# Patient Record
Sex: MALE | Race: WHITE | NOT HISPANIC OR LATINO | Employment: OTHER | URBAN - METROPOLITAN AREA
[De-identification: names, ages, dates, MRNs, and addresses within clinical notes are randomized per-mention and may not be internally consistent; named-entity substitution may affect disease eponyms.]

---

## 2017-01-09 ENCOUNTER — ALLSCRIPTS OFFICE VISIT (OUTPATIENT)
Dept: OTHER | Facility: OTHER | Age: 82
End: 2017-01-09

## 2017-01-10 ENCOUNTER — GENERIC CONVERSION - ENCOUNTER (OUTPATIENT)
Dept: OTHER | Facility: OTHER | Age: 82
End: 2017-01-10

## 2017-01-12 ENCOUNTER — GENERIC CONVERSION - ENCOUNTER (OUTPATIENT)
Dept: OTHER | Facility: OTHER | Age: 82
End: 2017-01-12

## 2017-01-31 ENCOUNTER — GENERIC CONVERSION - ENCOUNTER (OUTPATIENT)
Dept: OTHER | Facility: OTHER | Age: 82
End: 2017-01-31

## 2017-02-23 ENCOUNTER — HOSPITAL ENCOUNTER (OUTPATIENT)
Dept: NON INVASIVE DIAGNOSTICS | Facility: CLINIC | Age: 82
Discharge: HOME | End: 2017-02-23
Payer: MEDICARE

## 2017-02-23 DIAGNOSIS — I10 ESSENTIAL (PRIMARY) HYPERTENSION: ICD-10-CM

## 2017-02-23 DIAGNOSIS — I05.9 RHEUMATIC MITRAL VALVE DISEASE: ICD-10-CM

## 2017-02-23 DIAGNOSIS — E78.5 HYPERLIPIDEMIA: ICD-10-CM

## 2017-02-23 DIAGNOSIS — Z95.0 PRESENCE OF CARDIAC PACEMAKER: ICD-10-CM

## 2017-02-23 PROCEDURE — 93306 TTE W/DOPPLER COMPLETE: CPT

## 2017-02-24 ENCOUNTER — ALLSCRIPTS OFFICE VISIT (OUTPATIENT)
Dept: OTHER | Facility: OTHER | Age: 82
End: 2017-02-24

## 2017-02-27 ENCOUNTER — GENERIC CONVERSION - ENCOUNTER (OUTPATIENT)
Dept: OTHER | Facility: OTHER | Age: 82
End: 2017-02-27

## 2017-02-28 ENCOUNTER — GENERIC CONVERSION - ENCOUNTER (OUTPATIENT)
Dept: OTHER | Facility: OTHER | Age: 82
End: 2017-02-28

## 2017-03-01 ENCOUNTER — APPOINTMENT (OUTPATIENT)
Dept: LAB | Facility: CLINIC | Age: 82
End: 2017-03-01
Payer: MEDICARE

## 2017-03-01 ENCOUNTER — GENERIC CONVERSION - ENCOUNTER (OUTPATIENT)
Dept: OTHER | Facility: OTHER | Age: 82
End: 2017-03-01

## 2017-03-01 DIAGNOSIS — I10 ESSENTIAL (PRIMARY) HYPERTENSION: ICD-10-CM

## 2017-03-01 DIAGNOSIS — I25.10 ATHEROSCLEROTIC HEART DISEASE OF NATIVE CORONARY ARTERY WITHOUT ANGINA PECTORIS: ICD-10-CM

## 2017-03-01 DIAGNOSIS — E78.5 HYPERLIPIDEMIA: ICD-10-CM

## 2017-03-01 DIAGNOSIS — E11.9 TYPE 2 DIABETES MELLITUS WITHOUT COMPLICATIONS (HCC): ICD-10-CM

## 2017-03-01 LAB
CHOLEST SERPL-MCNC: 134 MG/DL (ref 50–200)
CREAT UR-MCNC: 72 MG/DL
EST. AVERAGE GLUCOSE BLD GHB EST-MCNC: 100 MG/DL
HBA1C MFR BLD: 5.1 % (ref 4.2–6.3)
HDLC SERPL-MCNC: 53 MG/DL (ref 40–60)
LDLC SERPL CALC-MCNC: 68 MG/DL (ref 0–100)
MICROALBUMIN UR-MCNC: 5.2 MG/L (ref 0–20)
MICROALBUMIN/CREAT 24H UR: 7 MG/G CREATININE (ref 0–30)
TRIGL SERPL-MCNC: 63 MG/DL

## 2017-03-01 PROCEDURE — 82570 ASSAY OF URINE CREATININE: CPT

## 2017-03-01 PROCEDURE — 80061 LIPID PANEL: CPT

## 2017-03-01 PROCEDURE — 82043 UR ALBUMIN QUANTITATIVE: CPT

## 2017-03-01 PROCEDURE — 36415 COLL VENOUS BLD VENIPUNCTURE: CPT

## 2017-03-01 PROCEDURE — 83036 HEMOGLOBIN GLYCOSYLATED A1C: CPT

## 2017-03-13 ENCOUNTER — ALLSCRIPTS OFFICE VISIT (OUTPATIENT)
Dept: OTHER | Facility: OTHER | Age: 82
End: 2017-03-13

## 2017-03-23 ENCOUNTER — GENERIC CONVERSION - ENCOUNTER (OUTPATIENT)
Dept: OTHER | Facility: OTHER | Age: 82
End: 2017-03-23

## 2017-04-08 ENCOUNTER — GENERIC CONVERSION - ENCOUNTER (OUTPATIENT)
Dept: OTHER | Facility: OTHER | Age: 82
End: 2017-04-08

## 2017-05-02 ENCOUNTER — ALLSCRIPTS OFFICE VISIT (OUTPATIENT)
Dept: OTHER | Facility: OTHER | Age: 82
End: 2017-05-02

## 2017-05-05 ENCOUNTER — GENERIC CONVERSION - ENCOUNTER (OUTPATIENT)
Dept: OTHER | Facility: OTHER | Age: 82
End: 2017-05-05

## 2017-05-23 ENCOUNTER — GENERIC CONVERSION - ENCOUNTER (OUTPATIENT)
Dept: OTHER | Facility: OTHER | Age: 82
End: 2017-05-23

## 2017-08-02 ENCOUNTER — ALLSCRIPTS OFFICE VISIT (OUTPATIENT)
Dept: OTHER | Facility: OTHER | Age: 82
End: 2017-08-02

## 2017-08-31 ENCOUNTER — GENERIC CONVERSION - ENCOUNTER (OUTPATIENT)
Dept: OTHER | Facility: OTHER | Age: 82
End: 2017-08-31

## 2017-09-14 ENCOUNTER — ALLSCRIPTS OFFICE VISIT (OUTPATIENT)
Dept: OTHER | Facility: OTHER | Age: 82
End: 2017-09-14

## 2017-09-14 ENCOUNTER — TRANSCRIBE ORDERS (OUTPATIENT)
Dept: ADMINISTRATIVE | Facility: HOSPITAL | Age: 82
End: 2017-09-14

## 2017-09-14 DIAGNOSIS — E78.5 OTHER AND UNSPECIFIED HYPERLIPIDEMIA: ICD-10-CM

## 2017-09-14 DIAGNOSIS — Z95.0 CARDIAC PACEMAKER IN SITU: ICD-10-CM

## 2017-09-14 DIAGNOSIS — I10 ESSENTIAL HYPERTENSION, MALIGNANT: ICD-10-CM

## 2017-09-14 DIAGNOSIS — I25.10 ATHEROSCLEROSIS OF NATIVE CORONARY ARTERY OF NATIVE HEART WITHOUT ANGINA PECTORIS: Primary | ICD-10-CM

## 2017-09-14 DIAGNOSIS — I34.1 J.B. BARLOW'S SYNDROME: ICD-10-CM

## 2017-11-03 ENCOUNTER — ALLSCRIPTS OFFICE VISIT (OUTPATIENT)
Dept: OTHER | Facility: OTHER | Age: 82
End: 2017-11-03

## 2017-11-08 ENCOUNTER — TRANSCRIBE ORDERS (OUTPATIENT)
Dept: LAB | Facility: CLINIC | Age: 82
End: 2017-11-08

## 2017-11-08 ENCOUNTER — APPOINTMENT (OUTPATIENT)
Dept: LAB | Facility: CLINIC | Age: 82
End: 2017-11-08
Payer: MEDICARE

## 2017-11-08 DIAGNOSIS — C61 MALIGNANT NEOPLASM OF PROSTATE (HCC): Primary | ICD-10-CM

## 2017-11-08 LAB — PSA SERPL-MCNC: 1.7 NG/ML (ref 0–4)

## 2017-11-08 PROCEDURE — 36415 COLL VENOUS BLD VENIPUNCTURE: CPT

## 2017-11-08 PROCEDURE — G0103 PSA SCREENING: HCPCS

## 2017-11-20 ENCOUNTER — GENERIC CONVERSION - ENCOUNTER (OUTPATIENT)
Dept: OTHER | Facility: OTHER | Age: 82
End: 2017-11-20

## 2017-11-27 ENCOUNTER — GENERIC CONVERSION - ENCOUNTER (OUTPATIENT)
Dept: OTHER | Facility: OTHER | Age: 82
End: 2017-11-27

## 2017-11-27 LAB
LEFT EYE DIABETIC RETINOPATHY: NORMAL
RIGHT EYE DIABETIC RETINOPATHY: NORMAL

## 2018-01-12 VITALS
HEART RATE: 82 BPM | WEIGHT: 158.6 LBS | SYSTOLIC BLOOD PRESSURE: 132 MMHG | TEMPERATURE: 96.7 F | RESPIRATION RATE: 16 BRPM | HEIGHT: 69 IN | BODY MASS INDEX: 23.49 KG/M2 | DIASTOLIC BLOOD PRESSURE: 72 MMHG

## 2018-01-12 NOTE — MISCELLANEOUS
Message   Recorded as Task   Date: 06/29/2016 01:10 PM, Created By: Emeli Charlton   Task Name: Follow Up   Assigned To: SPA vashti clinical,Team   Regarding Patient: Katey Meza, Status: Active   Comment:    Emeli Charlton - 29 Jun 2016 1:10 PM     TASK CREATED  Caller: Self; General Medical Question; [phone] (Home)  TC from pt today at 1:05 stating he had left hip inj on 5/5/16 and asking how often it can be done? Advised pt typically it can be repeated in 3 months, with that in mind pt will c/b the end of July to schedule inj for Aug if needed  Michael Lopes - 29 Jun 2016 1:27 PM     TASK REPLIED TO: Previously Assigned To MATHIEU lincoln,Team  md aware        Active Problems    1  Aortic valve disease (424 1) (I35 9)   2  Arteriosclerosis of coronary artery (414 00) (I25 10)   3  Carpal tunnel syndrome (354 0) (G56 00)   4  Colon cancer screening (V76 51) (Z12 11)   5  Disc degeneration, lumbar (722 52) (M51 36)   6  Facet arthritis of lumbar region (721 3) (M46 96)   7  Glaucoma (365 9) (H40 9)   8  Glaucoma screening (V80 1) (Z13 5)   9  Hyperlipidemia (272 4) (E78 5)   10  Hypertension (401 9) (I10)   11  Lumbar canal stenosis (724 02) (M48 06)   12  Mitral valve disorder (424 0) (I05 9)   13  Need for prophylactic vaccination against Streptococcus pneumoniae (pneumococcus)    (V03 82) (Z23)   14  Need for prophylactic vaccination and inoculation against influenza (V04 81) (Z23)   15  Osteoarthritis of left hip (715 95) (M16 12)   16  Pacemaker (V45 01) (Z95 0)   17  Pacemaker battery depletion (V53 31) (Z45 010)   18  Prostate cancer (185) (C61)   19  Screening for genitourinary condition (V81 6) (Z13 89)   20  Screening for neurological condition (V80 09) (Z13 89)   21  Type 2 diabetes mellitus (250 00) (E11 9)    Current Meds   1  Aspirin 81 MG TABS; TAKE 1 TABLET EVERY OTHER DAY Recorded   2  Atorvastatin Calcium 10 MG Oral Tablet; Take 1 tablet daily;    Therapy: 51Lte6880 to (Last Rx:26Dso8171)  Requested for: 65Hjz9940 Ordered   3  Folic Acid 218 MCG Oral Tablet; 1 tablet every other day Recorded   4  Glimepiride 1 MG Oral Tablet; One tablet daily; Therapy: 58DZF1663 to (Evaluate:06Apr2017)  Requested for: 99Idb7275; Last   Rx:42Fcs2859 Ordered   5  Metoprolol Tartrate 25 MG Oral Tablet; TAKE 1 TABLET DAILY IN THE EVENING; Therapy: 37UCC5648 to (Last Rx:82Txj7393)  Requested for: 16ALV4853 Ordered   6  Metoprolol Tartrate 50 MG Oral Tablet; TAKE 1 TABLET DAILY IN THE MORNING; Therapy: 86DEG6063 to (Last Rx:18Jru5164)  Requested for: 66WLB9083 Ordered   7  One Daily For Men 50+ Advanced TABS; Therapy: (Armando Gardner) to Recorded   8  OneTouch Delica Lancets 86D Miscellaneous; pt tests once daily  MDD:1;   Therapy: 03MIP6602 to (Last Rx:17Ewh5687)  Requested for: 92Dir9316 Ordered   9  OneTouch Ultra Blue In [employer]; test once daily; Therapy: 00IWC9035 to (Evaluate:24Jun2016)  Requested for: 22Fvm0136; Last   Rx:72Ujw8736 Ordered    Allergies    1  Penicillins   2   Plavix TABS    Signatures   Electronically signed by : Ezequiel Driscoll, ; Jun 29 2016  1:48PM EST                       (Author)

## 2018-01-13 VITALS
HEART RATE: 66 BPM | BODY MASS INDEX: 22.9 KG/M2 | WEIGHT: 160 LBS | HEIGHT: 70 IN | SYSTOLIC BLOOD PRESSURE: 128 MMHG | DIASTOLIC BLOOD PRESSURE: 80 MMHG

## 2018-01-13 NOTE — RESULT NOTES
Message   Recorded as Task   Date: 08/19/2016 03:07 PM, Created By: Mary Jo Oviedo   Task Name: Follow Up   Assigned To: Mary Jo Oviedo   Regarding Patient: Janette Cloud, Status:  In Progress   Comment:    Ann Ley - 19 Aug 2016 3:07 PM     TASK CREATED  Received call from patient requesting to be scheduled for a repeat injection - patient states same pain, same area - pain located in left hip down left leg - patient states pain returned 2 weeks after his last injection but was advised it was too soon for a repeat and would have to wait - patient rates pain a 10 - please advise - thank you    ***last office visit was on 4/22/16 and last injection was on 5/6/16 for a Left Intra-art Hip injection***   Adeel Lee - 21 Aug 2016 9:07 PM     TASK REPLIED TO: Previously Assigned To Aravind shepard to repeat injection   Ann Ley - 23 Aug 2016 10:55 AM     TASK EDITED  Left message for patient to call back   Mary Jo Oviedo - 23 Aug 2016 10:55 AM     TASK IN [address] [place] [address] - 23 Aug 2016 1:19 PM     TASK EDITED  Received message from patient returning my call - left message for patient to call back   Mary Jo Oviedo - 23 Aug 2016 3:42 PM     TASK EDITED  S/W patient - patient scheduled for Tuesday, Sept 6 at [facility] with Dr Regino Castillo - patient advised nothing to eat or drink 1 hour prior to procedure but encouraged to have a light lunch - patient on low dose aspirin, no need to hold for this procedure, patient denies any abx's - patient also advised to arrange for  - patient aware and agreed        Signatures   Electronically signed by : Miriam Duran, ; Aug 23 2016  3:43PM EST                       (Author)

## 2018-01-14 VITALS
HEIGHT: 69 IN | DIASTOLIC BLOOD PRESSURE: 60 MMHG | SYSTOLIC BLOOD PRESSURE: 140 MMHG | WEIGHT: 158 LBS | BODY MASS INDEX: 23.4 KG/M2 | HEART RATE: 64 BPM

## 2018-01-14 VITALS
SYSTOLIC BLOOD PRESSURE: 140 MMHG | DIASTOLIC BLOOD PRESSURE: 72 MMHG | HEART RATE: 80 BPM | BODY MASS INDEX: 22.39 KG/M2 | HEIGHT: 70 IN | RESPIRATION RATE: 16 BRPM | TEMPERATURE: 95.1 F | WEIGHT: 156.38 LBS

## 2018-01-15 NOTE — RESULT NOTES
Message  Landry Zepeda I have enclosed a copy of your recent labs  your diabetes is well controlled with an A1c of 5 1 less than 7 normal  continue with your current medications  Results/Data     (1) LIPID PANEL, FASTING   CHOLESTEROL: 134 mg/dL Reference Range   TRIGLYCERIDES: 63 mg/dL Reference Range <=150  HDL,DIRECT: 53 mg/dL Reference Range 40-60  LDL CHOLESTEROL CALCULATED: 68 mg/dL Reference Range 0-100  (1) HEMOGLOBIN A1C   HEMOGLOBIN A1C: 5 1 % Reference Range 4 2-6 3  EST  AVG   GLUCOSE: 100 mg/dl  (1) MICROALBUMIN CREATININE RATIO, RANDOM URINE   CREATININE URINE: 72 0 mg/dL  MICROALBUMIN,URINE: 5 2 mg/L Reference Range 0 0-20 0  MICROALBUMIN/ CREAT R: 7 mg/g creatinine Reference Range 0-30    Signatures   Electronically signed by : PIETRO Carlson ; Mar  1 2017  7:22PM EST                       (Author)

## 2018-01-15 NOTE — MISCELLANEOUS
Message   Recorded as Task   Date: 05/12/2016 03:57 PM, Created By: Laura Monday   Task Name: Follow Up   Assigned To: Mikhail lima,Team   Regarding Patient: Winnie Tomas, Status: Active   CommentRayolisl Yvrose - 12 May 2016 3:57 PM     TASK CREATED   Kiki Stephens - 12 May 2016 3:58 PM     TASK EDITED  F/U    Pt is S/P LT INTRA-ART HIP INJ perfromed on 5/6/16 by Dr Shabnam Ronquillo  No f/u scheduled  Helena Merrill - 13 May 2016 10:58 AM     TASK EDITED  1st attempt to reach pt  LM for return call  Helena Merrill - 13 May 2016 1:57 PM     TASK EDITED  Spoke with pt who received 50% relief from inj  Current level of pain 5/10, prior to inj 6-10/10  Pt was able to do some gardening yesterday  Pt will f/u PRN  Kathy Esteves - 13 May 2016 2:18 PM     TASK REPLIED TO: Previously Assigned To Kathy Esteves md aware        Active Problems    1  Aortic valve disease (424 1) (I35 9)   2  Arteriosclerosis of coronary artery (414 00) (I25 10)   3  Carpal tunnel syndrome (354 0) (G56 00)   4  Disc degeneration, lumbar (722 52) (M51 36)   5  Facet arthritis of lumbar region (721 3) (M46 96)   6  Glaucoma (365 9) (H40 9)   7  Glaucoma screening (V80 1) (Z13 5)   8  Hyperlipidemia (272 4) (E78 5)   9  Hypertension (401 9) (I10)   10  Lumbar canal stenosis (724 02) (M48 06)   11  Mitral valve disorder (424 0) (I05 9)   12  Need for prophylactic vaccination against Streptococcus pneumoniae (pneumococcus)    (V03 82) (Z23)   13  Need for prophylactic vaccination and inoculation against influenza (V04 81) (Z23)   14  Osteoarthritis of left hip (715 95) (M16 12)   15  Pacemaker (V45 01) (Z95 0)   16  Pacemaker battery depletion (V53 31) (Z45 010)   17  Prostate cancer (185) (C61)   18  Screening for genitourinary condition (V81 6) (Z13 89)   19  Screening for neurological condition (V80 09) (Z13 89)   20  Type 2 diabetes mellitus (250 00) (E11 9)    Current Meds   1   Aspirin 81 MG TABS; TAKE 1 TABLET EVERY OTHER DAY Recorded   2  Atorvastatin Calcium 10 MG Oral Tablet; Take 1 tablet daily; Therapy: 32Agf7388 to (Last Rx:36Pot1903)  Requested for: 69Heo8768 Ordered   3  Folic Acid 693 MCG Oral Tablet; 1 tablet every other day Recorded   4  Glimepiride 1 MG Oral Tablet; One tablet daily; Therapy: 74WWB8781 to (Evaluate:06Apr2017)  Requested for: 64Wpc4905; Last   Rx:98Eyy0133 Ordered   5  Metoprolol Tartrate 25 MG Oral Tablet; TAKE 1 TABLET DAILY IN THE EVENING; Therapy: 21TWB0184 to (Last Rx:54Fkc1485)  Requested for: 61Ocw9415 Ordered   6  Metoprolol Tartrate 50 MG Oral Tablet; TAKE 1 TABLET DAILY IN THE MORNING; Therapy: 04WEH0649 to (Last Rx:82Pth2757)  Requested for: 04Bue2455 Ordered   7  One Daily For Men 50+ Advanced TABS; Therapy: (Dayami Islas) to Recorded   8  OneTouch Delica Lancets 11F Miscellaneous; pt tests once daily  MDD:1;   Therapy: 43UHI6813 to (Last Rx:95Tlg7397)  Requested for: 12Uub9912 Ordered   9  OneTouch Ultra Blue In [employer]; test once daily; Therapy: 24YNZ5991 to (Evaluate:24Jun2016)  Requested for: 93Taz5356; Last   Rx:31Sll1420 Ordered    Allergies    1  Penicillins   2   Plavix TABS    Signatures   Electronically signed by : Char Damian, ; May 13 2016  2:58PM EST                       (Author)

## 2018-01-30 ENCOUNTER — TELEPHONE (OUTPATIENT)
Dept: FAMILY MEDICINE CLINIC | Facility: CLINIC | Age: 83
End: 2018-01-30

## 2018-01-30 DIAGNOSIS — Z20.818 PERTUSSIS EXPOSURE: Primary | ICD-10-CM

## 2018-01-30 RX ORDER — AZITHROMYCIN 250 MG/1
TABLET, FILM COATED ORAL
Qty: 6 TABLET | Refills: 0 | Status: SHIPPED | OUTPATIENT
Start: 2018-01-30 | End: 2018-02-03

## 2018-02-02 DIAGNOSIS — I10 ESSENTIAL (PRIMARY) HYPERTENSION: Primary | ICD-10-CM

## 2018-02-03 RX ORDER — METOPROLOL TARTRATE 50 MG/1
TABLET, FILM COATED ORAL
Qty: 90 TABLET | Refills: 0 | Status: SHIPPED | OUTPATIENT
Start: 2018-02-03 | End: 2018-11-06 | Stop reason: SDUPTHER

## 2018-02-05 ENCOUNTER — CLINICAL SUPPORT (OUTPATIENT)
Dept: CARDIOLOGY CLINIC | Facility: CLINIC | Age: 83
End: 2018-02-05
Payer: MEDICARE

## 2018-02-05 DIAGNOSIS — I44.1 MOBITZ TYPE II ATRIOVENTRICULAR BLOCK: Primary | ICD-10-CM

## 2018-02-05 DIAGNOSIS — Z95.0 PRESENCE OF PERMANENT CARDIAC PACEMAKER: ICD-10-CM

## 2018-02-05 PROCEDURE — 93294 REM INTERROG EVL PM/LDLS PM: CPT | Performed by: INTERNAL MEDICINE

## 2018-02-05 PROCEDURE — 93296 REM INTERROG EVL PM/IDS: CPT | Performed by: INTERNAL MEDICINE

## 2018-02-06 NOTE — PROGRESS NOTES
CARELINK TRANSMISSION: BATTERY VOLTAGE ADEQUATE  (12 YRS)  AP 73%  63%  ALL AVAILABLE LEAD PARAMETERS WITHIN NORMAL LIMITS  NO SIGNIFICANT HIGH RATE EPISODES  NORMAL DEVICE FUNCTION  ---CELIS      Current Outpatient Prescriptions:     aspirin 81 MG tablet, Take 81 mg by mouth daily  , Disp: , Rfl:     atorvastatin (LIPITOR) 10 mg tablet, Take 10 mg by mouth daily  , Disp: , Rfl:     folic acid (FOLVITE) 024 MCG tablet, Take 800 mcg by mouth every other day , Disp: , Rfl:     glimepiride (AMARYL) 1 mg tablet, Take 1 mg by mouth every morning before breakfast , Disp: , Rfl:     metoprolol succinate (TOPROL-XL) 50 mg 24 hr tablet, Take 50 mg by mouth daily  , Disp: , Rfl:     metoprolol tartrate (LOPRESSOR) 25 mg tablet, TAKE 1 TABLET DAILY IN THE EVENING, Disp: 90 tablet, Rfl: 0    metoprolol tartrate (LOPRESSOR) 50 mg tablet, TAKE 1 TABLET DAILY IN THE MORNING, Disp: 90 tablet, Rfl: 0    Multiple Vitamins-Minerals (MULTIVITAMIN ADULT PO), Take 1 tablet by mouth every other day , Disp: , Rfl:

## 2018-02-14 DIAGNOSIS — I34.0 NONRHEUMATIC MITRAL VALVE INSUFFICIENCY: ICD-10-CM

## 2018-02-14 DIAGNOSIS — I10 ESSENTIAL (PRIMARY) HYPERTENSION: ICD-10-CM

## 2018-02-14 DIAGNOSIS — I25.10 ATHEROSCLEROTIC HEART DISEASE OF NATIVE CORONARY ARTERY WITHOUT ANGINA PECTORIS: ICD-10-CM

## 2018-02-14 DIAGNOSIS — Z95.0 PRESENCE OF CARDIAC PACEMAKER: ICD-10-CM

## 2018-02-14 DIAGNOSIS — E78.5 HYPERLIPIDEMIA: ICD-10-CM

## 2018-02-15 ENCOUNTER — HOSPITAL ENCOUNTER (OUTPATIENT)
Dept: NON INVASIVE DIAGNOSTICS | Facility: MEDICAL CENTER | Age: 83
Discharge: HOME | End: 2018-02-15
Payer: MEDICARE

## 2018-02-15 DIAGNOSIS — I34.0 NONRHEUMATIC MITRAL VALVE INSUFFICIENCY: ICD-10-CM

## 2018-02-15 DIAGNOSIS — I25.10 ATHEROSCLEROTIC HEART DISEASE OF NATIVE CORONARY ARTERY WITHOUT ANGINA PECTORIS: ICD-10-CM

## 2018-02-15 DIAGNOSIS — E78.5 HYPERLIPIDEMIA: ICD-10-CM

## 2018-02-15 DIAGNOSIS — I10 ESSENTIAL (PRIMARY) HYPERTENSION: ICD-10-CM

## 2018-02-15 DIAGNOSIS — Z95.0 PRESENCE OF CARDIAC PACEMAKER: ICD-10-CM

## 2018-02-15 PROCEDURE — 93306 TTE W/DOPPLER COMPLETE: CPT | Performed by: INTERNAL MEDICINE

## 2018-02-15 PROCEDURE — 93306 TTE W/DOPPLER COMPLETE: CPT

## 2018-02-25 PROBLEM — I34.0 MITRAL REGURGITATION: Status: ACTIVE | Noted: 2017-01-09

## 2018-02-25 PROBLEM — I35.1 AORTIC VALVE REGURGITATION: Status: ACTIVE | Noted: 2017-01-09

## 2018-02-25 RX ORDER — GLIMEPIRIDE 1 MG/1
1 TABLET ORAL DAILY
COMMUNITY
Start: 2016-01-04 | End: 2018-09-17 | Stop reason: SDUPTHER

## 2018-02-25 RX ORDER — METOPROLOL TARTRATE 50 MG/1
1 TABLET, FILM COATED ORAL
COMMUNITY
Start: 2010-09-17 | End: 2018-09-17 | Stop reason: SDUPTHER

## 2018-02-25 RX ORDER — ATORVASTATIN CALCIUM 10 MG/1
1 TABLET, FILM COATED ORAL DAILY
COMMUNITY
Start: 2015-12-20 | End: 2018-04-10 | Stop reason: SDUPTHER

## 2018-02-25 RX ORDER — UREA 10 %
1 LOTION (ML) TOPICAL EVERY OTHER DAY
COMMUNITY
End: 2018-09-17 | Stop reason: SDUPTHER

## 2018-02-25 NOTE — PROGRESS NOTES
Assessment/Plan:         Diagnoses and all orders for this visit:    Medicare annual wellness visit, subsequent    Type 2 diabetes mellitus without complication, without long-term current use of insulin ([facility] [address] )    Essential hypertension    Pure hypercholesterolemia    Arteriosclerosis of coronary artery    Non-rheumatic mitral regurgitation    Nonrheumatic aortic valve insufficiency    Prostate cancer (HCC)    Facet arthritis of lumbar region [facility])    Disc degeneration, lumbar    Other orders  -     aspirin 81 MG tablet; Take 1 tablet by mouth every other day    -     atorvastatin (LIPITOR) 10 mg tablet; Take 1 tablet by mouth daily  -     folic acid (FOLVITE) 265 MCG tablet; Take 1 tablet by mouth every other day  -     glimepiride (AMARYL) 1 mg tablet; Take 1 tablet by mouth daily  -     metoprolol tartrate (LOPRESSOR) 25 mg tablet; Take 1 tablet by mouth daily at bedtime    -     metoprolol tartrate (LOPRESSOR) 50 mg tablet; Take 1 tablet by mouth  -     CBC and differential  -     Comprehensive metabolic panel  -     Hemoglobin A1c  -     Lipid panel  -     Microalbumin / creatinine urine ratio  -     TSH, 3rd generation with T4 reflex  -     ONETOUCH DELICA LANCETS 08H MISC; by Does not apply route  -     Glucose Blood (ONETOUCH ULTRA BLUE VI); by In Vitro route daily         repeat labs  Follow up with Cardiology next month  Urology re-evaluation  Office visit 1 year  Up-to-date with flu vaccine and pneumococcal vaccines  Orthopedic evaluation 05/2018  Patient ID: Rosa Elena Hardy is a 80 y o  male  Follow up visit  Medications reviewed  Labs  Type 2 diabetes mellitus  On Glimepiride 1 mg daily  03/2017  A1c 5 1  Urine microalbuminin 5,2  Not on ACE or ARB  No hypoglycemic events  No neuropathy symptoms  Up to date with eye exam  Hypertension blood pressures stable on current regimen  Creatinine  Electrolytes  Hemoglobin   Hyperlipidemia on Atorvastatin 10 mg daily lipid profile 03/2017  Cholesterol 134  Triglycerides 63  HD 53L  LDL 68  Social History     Social History    Marital status: /Civil Union     Spouse name: N/A    Number of children: N/A    Years of education: N/A     Social History Main Topics    Smoking status: Never Smoker    Smokeless tobacco: Never Used    Alcohol use No    Drug use: No    Sexual activity: Not Asked     Other Topics Concern    None     Social History Narrative    None     Family History   Problem Relation Age of Onset    Coronary artery disease Mother     Diabetes Maternal Grandmother        Past Surgical History:   Procedure Laterality Date    CARDIAC PACEMAKER PLACEMENT  05/01/2005    CATARACT EXTRACTION Left 10/01/1995    CATARACT EXTRACTION Right 02/01/2000    CORONARY ANGIOPLASTY  01/01/2010 2010    CORONARY ANGIOPLASTY WITH STENT PLACEMENT  11/01/2002    INGUINAL HERNIA REPAIR Right 01/01/1998    INGUINAL HERNIA REPAIR Left 01/01/2009 2009    LUMBAR LAMINECTOMY  01/01/2002 2002    REPLACEMENT TOTAL KNEE Right 06/01/2010    right total knee replacement with complications    RHIZOTOMY      TONSILLECTOMY AND ADENOIDECTOMY  01/01/1937    1937    TOTAL HIP ARTHROPLASTY Left 01/18/2017       Review of Systems   Constitutional: Negative for appetite change, chills, fever and unexpected weight change  HENT: Negative for congestion, ear pain, sore throat and trouble swallowing  Eyes: Negative for visual disturbance  Respiratory: Negative for cough, chest tightness, shortness of breath and wheezing  Cardiovascular: Negative for chest pain, palpitations and leg swelling  CAD s/p stents  S/p pacemaker  02/2018  Echocardiogram normal left ventricular systolic function  EF 55%  No regional wall motion abnormalities  Grade 1 diastolic dysfunction  mild mitral regurgitation  Mild aortic regurgitation  Trace TR/NC  No pericardial effusion  Aortic root normal size     Gastrointestinal: Negative for abdominal pain, blood in stool, constipation, diarrhea, nausea and vomiting  Colonoscopy 04/2016   Genitourinary: Negative for difficulty urinating and urgency  Prostate CA s/p XRT  11/2017 PSA 1 7  11/2016 PSA 1 4  Musculoskeletal: Negative for arthralgias and myalgias  Intermittent pain left lateral hip and gluteal area worse with sitting  No pain with ambulation  S/p left THR 01/2017  Skin: Negative for rash  History of BCCs/SCCs   Allergic/Immunologic: Negative for environmental allergies  Neurological: Negative for dizziness, weakness, light-headedness and headaches  Hematological: Negative for adenopathy  Does not bruise/bleed easily  Psychiatric/Behavioral: Negative for dysphoric mood and sleep disturbance  /70 (BP Location: Left arm, Patient Position: Sitting, Cuff Size: Large)   Pulse 86   Temp (!) 96 °F (35 6 °C)   Resp 16   Ht 5' 8 5" (1 74 m)   Wt 69 9 kg (154 lb)   BMI 23 08 kg/m²      Physical Exam   Constitutional: He is oriented to person, place, and time  He appears well-developed and well-nourished  No distress  HENT:   Right Ear: Tympanic membrane normal    Left Ear: Tympanic membrane normal    Mouth/Throat: Oropharynx is clear and moist    Eyes: Conjunctivae and EOM are normal  Pupils are equal, round, and reactive to light  No scleral icterus  Neck: Normal range of motion  Neck supple  No JVD present  Carotid bruit is not present  No tracheal deviation present  No thyroid mass and no thyromegaly present  Cardiovascular: Normal rate, regular rhythm and normal heart sounds  Exam reveals no gallop  No murmur heard  Pulses:       Carotid pulses are 2+ on the right side, and 2+ on the left side  Pulmonary/Chest: Effort normal and breath sounds normal  No respiratory distress  He has no wheezes  He has no rales  Abdominal: Soft  Bowel sounds are normal  He exhibits no distension and no mass  There is no hepatosplenomegaly  There is no tenderness  Musculoskeletal: Normal range of motion  He exhibits no edema  ROM hips normal  Mild leg length discrepancy  Mild tenderness left trochanteric bursa area  Lymphadenopathy:     He has no cervical adenopathy  Neurological: He is alert and oriented to person, place, and time  He has normal strength  No cranial nerve deficit  Skin: Skin is warm and dry  No rash noted  Psychiatric: He has a normal mood and affect  His behavior is normal    Nursing note and vitals reviewed        AWV Clinical     ISAR:   Previous hospitalizations?:  Yes       Once in a Lifetime Medicare Screening:   EKG performed?:  Yes    AAA screening performed? (if performed, please add date to Health Maintenance):  No       Medicare Screening Tests and Risk Assessment:   AAA Risk Assessment    Age over 72 (males only):  Yes    Osteoporosis Risk Assessment    :  Yes    Age over 48:  Yes    HIV Risk Assessment    None indicated:  Yes        Drug and Alcohol Use:   Tobacco use    Cigarettes:  never smoker    Smokeless:  never used smokeless tobacco    Tobacco use duration    Tobacco Cessation Readiness    Alcohol use    Alcohol use:  rare use    Alcohol Treatment Readiness   Illicit Drug Use    Drug use:  never        Diet & Exercise:   Diet   What is your diet?:  Regular, Low Carb   How many servings a day of the following:   Fruits and Vegetables:  3-4    Whole Grains:  2    Dairy:  1    Exercise    Do you currently exercise?:  yes    Frequency:  daily    Type of exercise:  walking       Cognitive Impairment Screening:   Depression screening preformed:  Yes     PHQ-9 Depression scale score:  0   Cognitive Impairment Screening    Do you have difficulty learning or retaining new information?:  No Do you have difficulty handling new tasks?:  No   Do you have difficulty with reasoning?:  No Do you have difficulty with spatial ability and orientation?:  No   Do you have difficulty with language?:  No Do you have difficulty with behavior?:  No       Functional Ability/Level of Safety:   Hearing    Hearing difficulties:  No    Hearing aid:  No    Hearing Impairment Assessment    Current Activities    Status:  unlimited ADL's, unlimited IADL's, unlimited social activities, unlimited driving   Help needed with the folllowing:    ADL    Fall Risk   Have you fallen in the last 12 months?:  No    Do you have any chronic conditions that may contribute to a fall?:  Diabetes, Arthritis    Injury History       Home Safety:   Are there hazards in your environment?:  No   Home Safety Risk Factors       Advanced Directives:   Advanced Directives    Living Will:  Yes Durable POA for healthcare: Yes   Advanced directive:  Yes    Patient's End of Life Decisions        Urinary Incontinence:   Do you have urinary incontinence?:  No        Glaucoma:           Provider Screening     Preventative Screening/Counseling:   Cardiovascular Screening/Counseling:   (Labs Q5 years, EKG optional one-time)   General:  Screening Not Indicated Counseling:  Healthy Diet, Healthy Weight          Diabetes Screening/Counseling:   (2 tests/year if Pre-Diabetes or 1 test/year if no Diabetes)   General:  Screening Current           Colorectal Cancer Screening/Counseling:   (FOBT Q1 yr; Flex Sig Q4 yrs or Q10 yrs after Screening Colonoscopy; Screening Colonoscpy Q2 yrs High Risk or Q10 yrs Low Risk; Barium Enema Q2 yrs High Risk or Q4 yrs Low Risk)   General:  Screening Current           Prostate Cancer Screening/Counseling:   (Annual)    General:  Screening Current          Breast Cancer Screening/Counseling:   (Baseline Age 28 - 43; Annual Age 36+)         Cervical Cancer Screening/Counseling:   (Annual for High Risk or Childbearing Age with Abnormal Pap in Last 3 yrs;  Every 2 all others)         Osteoporosis Screening/Counseling:   (Every 2 Yrs if at risk or more if medically necessary)   General:  Screening Not Indicated           AAA Screening/Counseling:   (Once per Lifetime with risk factors)     Age over 72 (males only):  Yes    General:  Screening Not Indicated           Glaucoma Screening/Counseling:   (Annual)         HIV Screening/Counseling:   (Voluntary; Once annually for high risk OR 3 times for Pregnancy at diagnosis of IUP; 3rd trimester; and at Labor   General:  Screening Not Indicated           Hepatitis C Screening:             Immunizations:   Influenza (annual):   Influenza UTD This Year   Pneumococcal (Once in a Lifetime):  Lifetime Vaccine Completed   Zostavax (Medicare D Coverage, Pt >64 yo):  Zostavax Vaccine UTD   TD (Non-Medicare Wellness  Visit required):  Risks & Benefits Discussed       Other Preventative Couseling (Non-Medicare Wellness Visit Required):   fall prevention education provided, sunscreen education provided, weight reduction was discussed, Skin self-exam counseling given       Referrals (Non-Medicare Wellness Visit Required):   cardiologist consult, referral to dermatology, Urology       Medical Equipment/Suppliers:           Health Maintenance   Topic Date Due    GLAUCOMA SCREENING 67+ YR  11/13/2015    URINE MICROALBUMIN  03/01/2018    DTaP,Tdap,and Td Vaccines (2 - Td) 03/26/2018    OPHTHALMOLOGY EXAM  11/27/2018    Diabetic Foot Exam  01/31/2019    Fall Risk  02/26/2019    Depression Screening PHQ-9  02/26/2019    INFLUENZA VACCINE  Completed    PNEUMOCOCCAL POLYSACCHARIDE VACCINE AGE 72 AND OVER  Completed

## 2018-02-26 ENCOUNTER — OFFICE VISIT (OUTPATIENT)
Dept: FAMILY MEDICINE CLINIC | Facility: CLINIC | Age: 83
End: 2018-02-26
Payer: MEDICARE

## 2018-02-26 VITALS
HEART RATE: 86 BPM | BODY MASS INDEX: 22.81 KG/M2 | HEIGHT: 69 IN | WEIGHT: 154 LBS | RESPIRATION RATE: 16 BRPM | SYSTOLIC BLOOD PRESSURE: 126 MMHG | TEMPERATURE: 96 F | DIASTOLIC BLOOD PRESSURE: 70 MMHG

## 2018-02-26 DIAGNOSIS — Z00.00 MEDICARE ANNUAL WELLNESS VISIT, SUBSEQUENT: Primary | ICD-10-CM

## 2018-02-26 DIAGNOSIS — M47.816 FACET ARTHRITIS OF LUMBAR REGION: ICD-10-CM

## 2018-02-26 DIAGNOSIS — E11.9 TYPE 2 DIABETES MELLITUS WITHOUT COMPLICATION, WITHOUT LONG-TERM CURRENT USE OF INSULIN (HCC): ICD-10-CM

## 2018-02-26 DIAGNOSIS — I10 ESSENTIAL HYPERTENSION: ICD-10-CM

## 2018-02-26 DIAGNOSIS — E78.00 PURE HYPERCHOLESTEROLEMIA: ICD-10-CM

## 2018-02-26 DIAGNOSIS — I35.1 NONRHEUMATIC AORTIC VALVE INSUFFICIENCY: ICD-10-CM

## 2018-02-26 DIAGNOSIS — C61 PROSTATE CANCER (HCC): ICD-10-CM

## 2018-02-26 DIAGNOSIS — I25.10 ARTERIOSCLEROSIS OF CORONARY ARTERY: ICD-10-CM

## 2018-02-26 DIAGNOSIS — M51.36 DISC DEGENERATION, LUMBAR: ICD-10-CM

## 2018-02-26 DIAGNOSIS — I34.0 NON-RHEUMATIC MITRAL REGURGITATION: ICD-10-CM

## 2018-02-26 PROCEDURE — 99214 OFFICE O/P EST MOD 30 MIN: CPT | Performed by: FAMILY MEDICINE

## 2018-02-26 PROCEDURE — G0439 PPPS, SUBSEQ VISIT: HCPCS | Performed by: FAMILY MEDICINE

## 2018-02-26 RX ORDER — LANCETS 33 GAUGE
EACH MISCELLANEOUS
COMMUNITY
Start: 2016-02-25 | End: 2020-03-09 | Stop reason: SDUPTHER

## 2018-02-27 ENCOUNTER — APPOINTMENT (OUTPATIENT)
Dept: LAB | Facility: CLINIC | Age: 83
End: 2018-02-27
Payer: MEDICARE

## 2018-02-27 LAB
ALBUMIN SERPL BCP-MCNC: 3.8 G/DL (ref 3.5–5)
ALP SERPL-CCNC: 59 U/L (ref 46–116)
ALT SERPL W P-5'-P-CCNC: 27 U/L (ref 12–78)
ANION GAP SERPL CALCULATED.3IONS-SCNC: 5 MMOL/L (ref 4–13)
AST SERPL W P-5'-P-CCNC: 21 U/L (ref 5–45)
BASOPHILS # BLD AUTO: 0.03 THOUSANDS/ΜL (ref 0–0.1)
BASOPHILS NFR BLD AUTO: 1 % (ref 0–1)
BILIRUB SERPL-MCNC: 0.97 MG/DL (ref 0.2–1)
BUN SERPL-MCNC: 19 MG/DL (ref 5–25)
CALCIUM SERPL-MCNC: 9.3 MG/DL (ref 8.3–10.1)
CHLORIDE SERPL-SCNC: 103 MMOL/L (ref 100–108)
CHOLEST SERPL-MCNC: 130 MG/DL (ref 50–200)
CO2 SERPL-SCNC: 31 MMOL/L (ref 21–32)
CREAT SERPL-MCNC: 0.84 MG/DL (ref 0.6–1.3)
CREAT UR-MCNC: 83.1 MG/DL
EOSINOPHIL # BLD AUTO: 0.36 THOUSAND/ΜL (ref 0–0.61)
EOSINOPHIL NFR BLD AUTO: 8 % (ref 0–6)
ERYTHROCYTE [DISTWIDTH] IN BLOOD BY AUTOMATED COUNT: 13.4 % (ref 11.6–15.1)
EST. AVERAGE GLUCOSE BLD GHB EST-MCNC: 123 MG/DL
GFR SERPL CREATININE-BSD FRML MDRD: 81 ML/MIN/1.73SQ M
GLUCOSE P FAST SERPL-MCNC: 99 MG/DL (ref 65–99)
HBA1C MFR BLD: 5.9 % (ref 4.2–6.3)
HCT VFR BLD AUTO: 41.1 % (ref 36.5–49.3)
HDLC SERPL-MCNC: 44 MG/DL (ref 40–60)
HGB BLD-MCNC: 13.5 G/DL (ref 12–17)
LDLC SERPL CALC-MCNC: 72 MG/DL (ref 0–100)
LYMPHOCYTES # BLD AUTO: 0.78 THOUSANDS/ΜL (ref 0.6–4.47)
LYMPHOCYTES NFR BLD AUTO: 17 % (ref 14–44)
MCH RBC QN AUTO: 30.4 PG (ref 26.8–34.3)
MCHC RBC AUTO-ENTMCNC: 32.8 G/DL (ref 31.4–37.4)
MCV RBC AUTO: 93 FL (ref 82–98)
MICROALBUMIN UR-MCNC: <5 MG/L (ref 0–20)
MICROALBUMIN/CREAT 24H UR: <6 MG/G CREATININE (ref 0–30)
MONOCYTES # BLD AUTO: 0.51 THOUSAND/ΜL (ref 0.17–1.22)
MONOCYTES NFR BLD AUTO: 11 % (ref 4–12)
NEUTROPHILS # BLD AUTO: 2.93 THOUSANDS/ΜL (ref 1.85–7.62)
NEUTS SEG NFR BLD AUTO: 63 % (ref 43–75)
NRBC BLD AUTO-RTO: 0 /100 WBCS
PLATELET # BLD AUTO: 174 THOUSANDS/UL (ref 149–390)
PMV BLD AUTO: 11.1 FL (ref 8.9–12.7)
POTASSIUM SERPL-SCNC: 4.5 MMOL/L (ref 3.5–5.3)
PROT SERPL-MCNC: 7.4 G/DL (ref 6.4–8.2)
RBC # BLD AUTO: 4.44 MILLION/UL (ref 3.88–5.62)
SODIUM SERPL-SCNC: 139 MMOL/L (ref 136–145)
TRIGL SERPL-MCNC: 69 MG/DL
TSH SERPL DL<=0.05 MIU/L-ACNC: 1.86 UIU/ML (ref 0.36–3.74)
WBC # BLD AUTO: 4.61 THOUSAND/UL (ref 4.31–10.16)

## 2018-02-27 PROCEDURE — 85025 COMPLETE CBC W/AUTO DIFF WBC: CPT | Performed by: FAMILY MEDICINE

## 2018-02-27 PROCEDURE — 36415 COLL VENOUS BLD VENIPUNCTURE: CPT | Performed by: FAMILY MEDICINE

## 2018-02-27 PROCEDURE — 82043 UR ALBUMIN QUANTITATIVE: CPT | Performed by: FAMILY MEDICINE

## 2018-02-27 PROCEDURE — 82570 ASSAY OF URINE CREATININE: CPT | Performed by: FAMILY MEDICINE

## 2018-02-27 PROCEDURE — 80061 LIPID PANEL: CPT | Performed by: FAMILY MEDICINE

## 2018-02-27 PROCEDURE — 84443 ASSAY THYROID STIM HORMONE: CPT | Performed by: FAMILY MEDICINE

## 2018-02-27 PROCEDURE — 83036 HEMOGLOBIN GLYCOSYLATED A1C: CPT | Performed by: FAMILY MEDICINE

## 2018-02-27 PROCEDURE — 80053 COMPREHEN METABOLIC PANEL: CPT | Performed by: FAMILY MEDICINE

## 2018-03-07 NOTE — PROGRESS NOTES
"  Discussion/Summary  Normal device function      Results/Data  Cardiac Device Remote [id number] 11:56AM Omaira Aquinopiter     Test Name Result Flag Reference   MISCELLANEOUS COMMENT      CARELINK TRANSMISSION: BATTERY VOLTAGE ADEQUATE (12 YRS)  AP-81%, -56% (>40% MVP ON @ 60 PPM)  ALL AVAILABLE LEAD PARAMETERS WITHIN NORMAL LIMITS  NO SIGNIFICANT HIGH RATE EPISODES  NORMAL DEVICE FUNCTION  GV   Cardiac Electrophysiology Report      [id number]  pdf   DEVICE TYPE Pacemaker       Cardiac Electrophysiology Report [id number] 11:56AM Omaira Rollins     Test Name Result Flag Reference   Cardiac Electrophysiology Report      [id number]  pdf     Signatures   Electronically signed by : Orlando Mcclendon RN; Nov  3 2017 12:00PM EST                       (Author)    Electronically signed by : Ar Castro DO; Nov 5 2017  5:52PM EST                       (Author)    "

## 2018-03-07 NOTE — PROGRESS NOTES
"  Discussion/Summary  Normal device function      Results/Data  Cardiac Device Remote [id number] 01:29PM Christelle Montero     Test Name Result Flag Reference   MISCELLANEOUS COMMENT      CARELINK TRANSMISSION: BATTERY VOLTAGE ADEQUATE (13 YRS)  AP-75%, -51% (>40% MVP ON)  NO SIGNIFICANT HIGH RATE EPISODES  ALL AVAILABLE LEAD PARAMETERS WITHIN NORMAL LIMITS  NORMAL DEVICE FUNCTION  GV   Cardiac Electrophysiology Report      [id number]}  pdf   DEVICE TYPE Pacemaker       Cardiac Electrophysiology Report [id number] 01:29PM Christelle Legreyson     Test Name Result Flag Reference   Cardiac Electrophysiology Report      [id number]  pdf     Signatures   Electronically signed by : Ama Gallardo RN; Jul 26 2016  3:26PM EST                       (Author)    Electronically signed by : Glen Martinez DO; Jul 26 2016  6:20PM EST                       (Author)    "

## 2018-03-07 NOTE — PROGRESS NOTES
"  Discussion/Summary  Normal device function      Results/Data  Results   Cardiac Device In Clinic [id number] 01:54PM Marceil Hazard     Test Name Result Flag Reference   MISCELLANEOUS COMMENT (Report)     DEVICE INTERROGATED IN THE [place] OFFICE; BATTERY VOLTAGE ADEQUATE (13 YRS); AP = 72%,  = 46%; NO SIGNIFICANT HIGH RATE EPISODES; ALL LEAD PARAMETERS TEST WITHIN NORMAL LIMITS/STABLE; NO PROGRAMMING CHANGES MADE TO DEVICE PARAMETERS; WOUND CHECK: INCISION CLEAN AND DRY WITH EDGES APPROXIMATED; STERI STRIPS INTACT; WOUND CARE AND RESTRICTIONS REVIEWED WITH PATIENT; NORMAL DEVICE FUNCTION  eb   Cardiac Electrophysiology Report      [id number]  pdf   DEVICE TYPE Pacemaker       Cardiac Electrophysiology Report [id number] 01:54PM Marceil Hazard     Test Name Result Flag Reference   Cardiac Electrophysiology Report      [id number]  pdf     Signatures   Electronically signed by : Keiry Giron, ; Apr 25 2016 10:19AM EST                       (Author)    Electronically signed by : Maggie Lloyd DO;  Apr 25 2016  1:05PM EST                       (Author)    "

## 2018-03-07 NOTE — PROGRESS NOTES
"  Discussion/Summary  Normal device function      Results/Data  Cardiac Device Remote [id number] 11:38AM Boni Lei     Test Name Result Flag Reference   MISCELLANEOUS COMMENT      CARELINK TRANSMISSION: BATTERY VOLTAGE ADEQUATE (13 YRS)  AP-74%, -42% (>40% MVP ON/DDD @ 60 PPM)  ALL AVAILABLE LEAD PARAMETERS WITHIN NORMAL LIMITS  NO SIGNIFICANT HIGH RATE EPISODES  NORMAL DEVICE FUNCTION  GV   Cardiac Electrophysiology Report      [id number]  pdf   DEVICE TYPE Pacemaker       Cardiac Electrophysiology Report [id number] 11:38AM Boni Lei     Test Name Result Flag Reference   Cardiac Electrophysiology Report      [id number]  pdf     Signatures   Electronically signed by : Adwoa Mena RN; Aug  3 2017  1:48PM EST                       (Author)    Electronically signed by : Carol Toney DO; Aug  5 2017  9:10AM EST                       (Author)    "

## 2018-03-07 NOTE — PROGRESS NOTES
"  Discussion/Summary  Normal device function and Abnormal Device Function     Reached KANDI  Results/Data  Results   Cardiac Device Remote [id number] 03:43PM Lilyyash Albarado     Test Name Result Flag Reference   MISCELLANEOUS COMMENT (Report)     CARELINK TRANSMISSION: BATTERY VOLTAGE @ KANDI 2 81 V ON 3/8/16 - ARRANGEMENTS TO BE MADE FOR H&P; PACING MODE TO VVI 65 DUE TO ERT; AP = 4 1%,  = 42 3%; NO SIGNIFICANT HIGH RATE EPISODES; ALL LEAD PARAMETERS APPEAR WITHIN NORMAL LIMITS/STABLE; NORMAL DEVICE FUNCTION @ KANDI  eb   Cardiac Electrophysiology Report      [id number]  pdf   DEVICE TYPE Pacemaker       Cardiac Electrophysiology Report [id number] 03:43PM Lily Albarado     Test Name Result Flag Reference   Cardiac Electrophysiology Report      [id number]  pdf     Signatures   Electronically signed by : Irais Rodriguez, ; Mar 14 2016 10:32AM EST                       (Author)    Electronically signed by : PIETRO Albarado ; Mar 19 2016  5:57PM EST                       (Author)    "

## 2018-03-07 NOTE — PROGRESS NOTES
"  Discussion/Summary  Normal device function      Results/Data  Cardiac Device In Clinic [id number] 02:28PM Josee Killian     Test Name Result Flag Reference   MISCELLANEOUS COMMENT (Report)     DEVICE INTERROGATED IN THE [place] OFFICE: BATTERY VOLTAGE ADEQUATE (13 YR)  AP 76 7%,  48 3% ( > 40 %/ MVP ON)  ALL LEAD PARAMETERS WITHIN NORMAL LIMITS  NO SIGNIFICANT HIGH RATE EPISODES  NO PROGRAMMING CHANGES MADE TO DEVICE PARAMETERS  PACEMAKER FUNCTIONING APPROPRIATELY  RG/CP   Cardiac Electrophysiology Report      [id number]  pdf   DEVICE TYPE Pacemaker       Cardiac Electrophysiology Report [id number] 02:28PM Josee Killian     Test Name Result Flag Reference   Cardiac Electrophysiology Report      [id number]  pdf     Signatures   Electronically signed by : Anaya Harris, ; May  2 2017  3:58PM EST                       (Author)    Electronically signed by : Christy Lauren DO; May  2 2017  4:01PM EST                       (Author)    "

## 2018-03-20 ENCOUNTER — OFFICE VISIT (OUTPATIENT)
Dept: CARDIOLOGY CLINIC | Facility: CLINIC | Age: 83
End: 2018-03-20
Payer: MEDICARE

## 2018-03-20 VITALS
SYSTOLIC BLOOD PRESSURE: 140 MMHG | DIASTOLIC BLOOD PRESSURE: 80 MMHG | HEIGHT: 69 IN | BODY MASS INDEX: 23.7 KG/M2 | WEIGHT: 160 LBS | HEART RATE: 66 BPM

## 2018-03-20 DIAGNOSIS — I34.0 NON-RHEUMATIC MITRAL REGURGITATION: ICD-10-CM

## 2018-03-20 DIAGNOSIS — I25.10 CORONARY ARTERIOSCLEROSIS: ICD-10-CM

## 2018-03-20 DIAGNOSIS — Z95.0 PACEMAKER: ICD-10-CM

## 2018-03-20 DIAGNOSIS — E78.00 PURE HYPERCHOLESTEROLEMIA: ICD-10-CM

## 2018-03-20 DIAGNOSIS — I10 ESSENTIAL (PRIMARY) HYPERTENSION: Primary | ICD-10-CM

## 2018-03-20 PROCEDURE — 99214 OFFICE O/P EST MOD 30 MIN: CPT | Performed by: INTERNAL MEDICINE

## 2018-03-20 NOTE — PROGRESS NOTES
Cardiology Follow Up    Olimpia Lakhani    1934  [id number]  [address] [facility]  [address] [place] [address] [address]    1  Essential (primary) hypertension     2  Pure hypercholesterolemia     3  Coronary arteriosclerosis     4  Pacemaker     5  Non-rheumatic mitral regurgitation         Interval History:  The patient is here for a follow-up visit  He was last seen by me in September of last year  Since that time he has had ongoing interrogation of his permanent pacemaker  At the time of his last pacer check in November his device was working properly  He recently had an echocardiogram done in February which demonstrated preserved LV systolic function with mild mitral and aortic regurgitation  He has been feeling well  He has had no chest pain or significant dyspnea  He is here today with his wife  Patient Active Problem List   Diagnosis    Aortic valve regurgitation    Arteriosclerosis of coronary artery    Carpal tunnel syndrome    Disc degeneration, lumbar    Facet arthritis of lumbar region ([facility] [address] [facility]    Glaucoma    Hyperlipidemia    Hypertension    Lumbar canal stenosis    Mitral regurgitation    Prostate cancer ([facility] [address] [facility]    Type 2 diabetes mellitus ([facility] [address] )     Past Medical History:   Diagnosis Date    Basal cell carcinoma     Benign polyp of large intestine     Osteoarthritis of left hip     last assessed [id number]    Piriformis syndrome of left side     last assessed [id number]     Social History     Social History    Marital status: /Civil Union     Spouse name: N/A    Number of children: N/A    Years of education: N/A     Occupational History    Not on file       Social History Main Topics    Smoking status: Never Smoker    Smokeless tobacco: Never Used    Alcohol use No    Drug use: No    Sexual activity: Not on file     Other Topics Concern    Not on file     Social History Narrative  No narrative on file      Family History   Problem Relation Age of Onset    Coronary artery disease Mother     Diabetes Maternal Grandmother      Past Surgical History:   Procedure Laterality Date    CARDIAC PACEMAKER PLACEMENT  05/01/2005    CATARACT EXTRACTION Left 10/01/1995    CATARACT EXTRACTION Right 02/01/2000    CORONARY ANGIOPLASTY  01/01/2010 2010    CORONARY ANGIOPLASTY WITH STENT PLACEMENT  11/01/2002    INGUINAL HERNIA REPAIR Right 01/01/1998    INGUINAL HERNIA REPAIR Left 01/01/2009 2009    LUMBAR LAMINECTOMY  01/01/2002 2002    REPLACEMENT TOTAL KNEE Right 06/01/2010    right total knee replacement with complications    RHIZOTOMY      TONSILLECTOMY AND ADENOIDECTOMY  01/01/1937    1937    TOTAL HIP ARTHROPLASTY Left 01/18/2017       Current Outpatient Prescriptions:     aspirin 81 MG tablet, Take 81 mg by mouth daily  , Disp: , Rfl:     aspirin 81 MG tablet, Take 1 tablet by mouth every other day  , Disp: , Rfl:     atorvastatin (LIPITOR) 10 mg tablet, Take 10 mg by mouth daily  , Disp: , Rfl:     atorvastatin (LIPITOR) 10 mg tablet, Take 1 tablet by mouth daily, Disp: , Rfl:     folic acid (FOLVITE) 062 MCG tablet, Take 800 mcg by mouth every other day , Disp: , Rfl:     folic acid (FOLVITE) 561 MCG tablet, Take 1 tablet by mouth every other day, Disp: , Rfl:     glimepiride (AMARYL) 1 mg tablet, Take 1 mg by mouth every morning before breakfast , Disp: , Rfl:     glimepiride (AMARYL) 1 mg tablet, Take 1 tablet by mouth daily, Disp: , Rfl:     Glucose Blood (ONETOUCH ULTRA BLUE VI), by In Vitro route daily, Disp: , Rfl:     metoprolol succinate (TOPROL-XL) 50 mg 24 hr tablet, Take 50 mg by mouth daily  , Disp: , Rfl:     metoprolol tartrate (LOPRESSOR) 25 mg tablet, TAKE 1 TABLET DAILY IN THE EVENING, Disp: 90 tablet, Rfl: 0    metoprolol tartrate (LOPRESSOR) 25 mg tablet, Take 1 tablet by mouth daily at bedtime  , Disp: , Rfl:     metoprolol tartrate (LOPRESSOR) 50 mg tablet, TAKE 1 TABLET DAILY IN THE MORNING, Disp: 90 tablet, Rfl: 0    metoprolol tartrate (LOPRESSOR) 50 mg tablet, Take 1 tablet by mouth, Disp: , Rfl:     Multiple Vitamins-Minerals (MULTIVITAMIN ADULT PO), Take 1 tablet by mouth every other day , Disp: , Rfl:     ONETOUCH DELICA LANCETS 31K MISC, by Does not apply route, Disp: , Rfl:   Allergies   Allergen Reactions    Penicillins     Plavix [Clopidogrel]        Labs:not applicable  Imaging: No results found  Review of Systems:  Review of Systems   All other systems reviewed and are negative  Physical Exam:  Physical Exam   Constitutional: He is oriented to person, place, and time  He appears well-developed and well-nourished  HENT:   Head: Normocephalic and atraumatic  Eyes: Conjunctivae are normal  Pupils are equal, round, and reactive to light  Neck: Normal range of motion  Neck supple  Cardiovascular: Normal rate and normal heart sounds  Pulmonary/Chest: Effort normal and breath sounds normal    Neurological: He is alert and oriented to person, place, and time  Skin: Skin is warm and dry  Psychiatric: He has a normal mood and affect  Vitals reviewed  Discussion/Summary:I will continue the patient's present medical regimen  The patient appears well compensated  I have asked the patient to call if there is a problem in the interim otherwise I will see the patient in six months time

## 2018-04-10 DIAGNOSIS — E78.00 PURE HYPERCHOLESTEROLEMIA: Primary | ICD-10-CM

## 2018-04-10 DIAGNOSIS — E11.9 TYPE 2 DIABETES MELLITUS WITHOUT COMPLICATION, WITHOUT LONG-TERM CURRENT USE OF INSULIN (HCC): Primary | ICD-10-CM

## 2018-04-10 RX ORDER — ATORVASTATIN CALCIUM 10 MG/1
TABLET, FILM COATED ORAL
Qty: 90 TABLET | Refills: 0 | Status: SHIPPED | OUTPATIENT
Start: 2018-04-10 | End: 2018-07-15 | Stop reason: SDUPTHER

## 2018-04-10 RX ORDER — GLIMEPIRIDE 1 MG/1
TABLET ORAL
Qty: 90 TABLET | Refills: 3 | Status: SHIPPED | OUTPATIENT
Start: 2018-04-10 | End: 2018-09-17 | Stop reason: SDUPTHER

## 2018-04-27 DIAGNOSIS — I25.10 CORONARY ARTERIOSCLEROSIS: Primary | ICD-10-CM

## 2018-04-28 RX ORDER — METOPROLOL TARTRATE 50 MG/1
TABLET, FILM COATED ORAL
Qty: 90 TABLET | Refills: 0 | Status: SHIPPED | OUTPATIENT
Start: 2018-04-28 | End: 2018-09-17 | Stop reason: SDUPTHER

## 2018-06-05 ENCOUNTER — IN-CLINIC DEVICE VISIT (OUTPATIENT)
Dept: CARDIOLOGY CLINIC | Facility: MEDICAL CENTER | Age: 83
End: 2018-06-05
Payer: MEDICARE

## 2018-06-05 DIAGNOSIS — Z95.0 PRESENCE OF PERMANENT CARDIAC PACEMAKER: ICD-10-CM

## 2018-06-05 DIAGNOSIS — I44.1 SECOND DEGREE AV BLOCK: Primary | ICD-10-CM

## 2018-06-05 PROCEDURE — 93280 PM DEVICE PROGR EVAL DUAL: CPT | Performed by: INTERNAL MEDICINE

## 2018-06-05 NOTE — PROGRESS NOTES
MDT DUAL PPM  DEVICE INTERROGATED IN THE [place] OFFICE:  BATTERY VOLTAGE ADEQUATE (11 YR)    AP 84 7%  69 1% (>40%/MVP ON)   ALL LEAD PARAMETERS WITHIN NORMAL LIMITS   NO SIGNIFICANT HIGH RATE EPISODES   NO PROGRAMMING CHANGES MADE TO DEVICE PARAMETERS   NORMAL DEVICE FUNCTION   RG

## 2018-07-05 DIAGNOSIS — E11.9 TYPE 2 DIABETES MELLITUS WITHOUT COMPLICATION, WITHOUT LONG-TERM CURRENT USE OF INSULIN (HCC): Primary | ICD-10-CM

## 2018-07-10 DIAGNOSIS — E11.9 TYPE 2 DIABETES MELLITUS WITHOUT COMPLICATION, WITHOUT LONG-TERM CURRENT USE OF INSULIN (HCC): Primary | ICD-10-CM

## 2018-07-15 DIAGNOSIS — E78.00 PURE HYPERCHOLESTEROLEMIA: ICD-10-CM

## 2018-07-15 DIAGNOSIS — I25.10 CORONARY ARTERIOSCLEROSIS: ICD-10-CM

## 2018-07-15 RX ORDER — ATORVASTATIN CALCIUM 10 MG/1
TABLET, FILM COATED ORAL
Qty: 90 TABLET | Refills: 0 | Status: SHIPPED | OUTPATIENT
Start: 2018-07-15 | End: 2018-09-20 | Stop reason: SDUPTHER

## 2018-07-15 RX ORDER — METOPROLOL TARTRATE 50 MG/1
TABLET, FILM COATED ORAL
Qty: 90 TABLET | Refills: 0 | Status: SHIPPED | OUTPATIENT
Start: 2018-07-15 | End: 2018-09-17 | Stop reason: SDUPTHER

## 2018-09-06 ENCOUNTER — REMOTE DEVICE CLINIC VISIT (OUTPATIENT)
Dept: CARDIOLOGY CLINIC | Facility: CLINIC | Age: 83
End: 2018-09-06
Payer: MEDICARE

## 2018-09-06 DIAGNOSIS — I49.5 SSS (SICK SINUS SYNDROME) (HCC): Primary | ICD-10-CM

## 2018-09-06 DIAGNOSIS — Z95.0 PRESENCE OF PERMANENT CARDIAC PACEMAKER: ICD-10-CM

## 2018-09-06 DIAGNOSIS — I44.1 SECOND DEGREE AV BLOCK: ICD-10-CM

## 2018-09-06 PROCEDURE — 93296 REM INTERROG EVL PM/IDS: CPT | Performed by: INTERNAL MEDICINE

## 2018-09-06 PROCEDURE — 93294 REM INTERROG EVL PM/LDLS PM: CPT | Performed by: INTERNAL MEDICINE

## 2018-09-06 NOTE — PROGRESS NOTES
MDT DUAL PPM  CARELINK TRANSMISSION:  BATTERY VOLTAGE ADEQUATE (11 YR)    AP 90 3%  75 1% (AVB)   ALL AVAILABLE LEAD PARAMETERS WITHIN NORMAL LIMITS   NO SIGNIFICANT HIGH RATE EPISODES   NORMAL DEVICE FUNCTION   RG

## 2018-09-17 ENCOUNTER — OFFICE VISIT (OUTPATIENT)
Dept: CARDIOLOGY CLINIC | Facility: CLINIC | Age: 83
End: 2018-09-17
Payer: MEDICARE

## 2018-09-17 VITALS
DIASTOLIC BLOOD PRESSURE: 82 MMHG | HEIGHT: 70 IN | BODY MASS INDEX: 23.02 KG/M2 | HEART RATE: 66 BPM | SYSTOLIC BLOOD PRESSURE: 140 MMHG | WEIGHT: 160.8 LBS

## 2018-09-17 DIAGNOSIS — I10 ESSENTIAL (PRIMARY) HYPERTENSION: ICD-10-CM

## 2018-09-17 DIAGNOSIS — Z95.0 PRESENCE OF PERMANENT CARDIAC PACEMAKER: ICD-10-CM

## 2018-09-17 DIAGNOSIS — I49.5 SSS (SICK SINUS SYNDROME) (HCC): Primary | ICD-10-CM

## 2018-09-17 DIAGNOSIS — I34.0 NON-RHEUMATIC MITRAL REGURGITATION: ICD-10-CM

## 2018-09-17 DIAGNOSIS — I25.10 CORONARY ARTERIOSCLEROSIS: ICD-10-CM

## 2018-09-17 PROCEDURE — 99214 OFFICE O/P EST MOD 30 MIN: CPT | Performed by: INTERNAL MEDICINE

## 2018-09-17 NOTE — PROGRESS NOTES
Cardiology Follow Up    Seth Rodrigues    1934  [id number]  [address] [facility]  [address] [place], [place] [phone]    1  SSS (sick sinus syndrome) ([facility] [address] )     2  Presence of permanent cardiac pacemaker     3  Coronary arteriosclerosis     4  Essential (primary) hypertension     5  Non-rheumatic mitral regurgitation         Interval History:  Patient is here for a follow-up visit  He was last seen by me in March  He has had ongoing interrogation in surveillance of his permanent pacemaker  At the time of his most recent check early in September that the device was found to be functioning appropriately  There were no significant high rate episodes  His most recent echocardiogram in February demonstrated preserved LV systolic function and no significant valvular heart disease  He has been feeling well  He has had no chest pain or significant dyspnea  Patient Active Problem List   Diagnosis    Aortic valve regurgitation    Arteriosclerosis of coronary artery    Carpal tunnel syndrome    Disc degeneration, lumbar    Facet arthritis of lumbar region ([facility] [address] [facility]    Glaucoma    Hyperlipidemia    Hypertension    Lumbar canal stenosis    Mitral regurgitation    Prostate cancer ([facility] [address] [facility]    Type 2 diabetes mellitus ([facility] [address] )     Past Medical History:   Diagnosis Date    Basal cell carcinoma     Benign polyp of large intestine     Osteoarthritis of left hip     last assessed [id number]    Piriformis syndrome of left side     last assessed 18Oct2016     Social History     Social History    Marital status: /Civil Union     Spouse name: N/A    Number of children: N/A    Years of education: N/A     Occupational History    Not on file       Social History Main Topics    Smoking status: Never Smoker    Smokeless tobacco: Never Used    Alcohol use No    Drug use: No    Sexual activity: Not on file Other Topics Concern    Not on file     Social History Narrative    No narrative on file      Family History   Problem Relation Age of Onset    Coronary artery disease Mother     Diabetes Maternal Grandmother      Past Surgical History:   Procedure Laterality Date    CARDIAC PACEMAKER PLACEMENT  05/01/2005    CATARACT EXTRACTION Left 10/01/1995    CATARACT EXTRACTION Right 02/01/2000    CORONARY ANGIOPLASTY  01/01/2010 2010    CORONARY ANGIOPLASTY WITH STENT PLACEMENT  11/01/2002    INGUINAL HERNIA REPAIR Right 01/01/1998    INGUINAL HERNIA REPAIR Left 01/01/2009 2009    LUMBAR LAMINECTOMY  01/01/2002 2002    REPLACEMENT TOTAL KNEE Right 06/01/2010    right total knee replacement with complications    RHIZOTOMY      TONSILLECTOMY AND ADENOIDECTOMY  01/01/1937    1937    TOTAL HIP ARTHROPLASTY Left 01/18/2017       Current Outpatient Prescriptions:     aspirin 81 MG tablet, Take 1 tablet by mouth every other day  , Disp: , Rfl:     atorvastatin (LIPITOR) 10 mg tablet, TAKE 1 TABLET DAILY, Disp: 90 tablet, Rfl: 0    folic acid (FOLVITE) 486 MCG tablet, Take 800 mcg by mouth every other day , Disp: , Rfl:     folic acid (FOLVITE) 303 MCG tablet, Take 1 tablet by mouth every other day, Disp: , Rfl:     glimepiride (AMARYL) 1 mg tablet, Take 1 mg by mouth every morning before breakfast , Disp: , Rfl:     glimepiride (AMARYL) 1 mg tablet, Take 1 tablet by mouth daily, Disp: , Rfl:     glimepiride (AMARYL) 1 mg tablet, TAKE 1 TABLET DAILY, Disp: 90 tablet, Rfl: 3    glucose blood (ONE TOUCH ULTRA TEST) test strip, Patient tests once daily  , Disp: 50 each, Rfl: 3    Glucose Blood (ONETOUCH ULTRA BLUE VI), by In Vitro route daily, Disp: , Rfl:     metoprolol tartrate (LOPRESSOR) 25 mg tablet, TAKE 1 TABLET DAILY IN THE EVENING, Disp: 90 tablet, Rfl: 0    metoprolol tartrate (LOPRESSOR) 50 mg tablet, TAKE 1 TABLET DAILY IN THE MORNING, Disp: 90 tablet, Rfl: 0    metoprolol tartrate (LOPRESSOR) 50 mg tablet, Take 1 tablet by mouth, Disp: , Rfl:     metoprolol tartrate (LOPRESSOR) 50 mg tablet, TAKE 1 TABLET DAILY IN THE MORNING, Disp: 90 tablet, Rfl: 0    metoprolol tartrate (LOPRESSOR) 50 mg tablet, TAKE 1 TABLET DAILY IN THE MORNING, Disp: 90 tablet, Rfl: 0    Multiple Vitamins-Minerals (MULTIVITAMIN ADULT PO), Take 1 tablet by mouth every other day , Disp: , Rfl:     ONETOUCH DELICA LANCETS 14I MISC, by Does not apply route, Disp: , Rfl:   Allergies   Allergen Reactions    Penicillins     Plavix [Clopidogrel]        Labs:not applicable  Imaging: No results found  Review of Systems:  Review of Systems   All other systems reviewed and are negative  Physical Exam:  Physical Exam   Constitutional: He is oriented to person, place, and time  He appears well-developed and well-nourished  HENT:   Head: Normocephalic and atraumatic  Eyes: Conjunctivae are normal  Pupils are equal, round, and reactive to light  Neck: Normal range of motion  Neck supple  Cardiovascular: Normal rate and normal heart sounds  Pulmonary/Chest: Effort normal and breath sounds normal    Neurological: He is alert and oriented to person, place, and time  Skin: Skin is warm and dry  Psychiatric: He has a normal mood and affect  Vitals reviewed  Discussion/Summary:I will continue the patient's present medical regimen  The patient appears well compensated  I have asked the patient to call if there is a problem in the interim otherwise I will see the patient in six months time

## 2018-09-20 DIAGNOSIS — E78.00 PURE HYPERCHOLESTEROLEMIA: ICD-10-CM

## 2018-09-20 RX ORDER — ATORVASTATIN CALCIUM 10 MG/1
TABLET, FILM COATED ORAL
Qty: 90 TABLET | Refills: 0 | Status: SHIPPED | OUTPATIENT
Start: 2018-09-20 | End: 2018-12-23 | Stop reason: SDUPTHER

## 2018-10-26 ENCOUNTER — IMMUNIZATION (OUTPATIENT)
Dept: FAMILY MEDICINE CLINIC | Facility: CLINIC | Age: 83
End: 2018-10-26
Payer: MEDICARE

## 2018-10-26 DIAGNOSIS — Z23 NEED FOR IMMUNIZATION AGAINST INFLUENZA: Primary | ICD-10-CM

## 2018-10-26 PROCEDURE — G0008 ADMIN INFLUENZA VIRUS VAC: HCPCS

## 2018-10-26 PROCEDURE — 90662 IIV NO PRSV INCREASED AG IM: CPT

## 2018-11-06 DIAGNOSIS — I25.10 CORONARY ARTERIOSCLEROSIS: ICD-10-CM

## 2018-11-06 DIAGNOSIS — I10 ESSENTIAL (PRIMARY) HYPERTENSION: ICD-10-CM

## 2018-11-07 RX ORDER — METOPROLOL TARTRATE 50 MG/1
TABLET, FILM COATED ORAL
Qty: 90 TABLET | Refills: 0 | Status: SHIPPED | OUTPATIENT
Start: 2018-11-07 | End: 2019-01-15 | Stop reason: SDUPTHER

## 2018-11-08 ENCOUNTER — TRANSCRIBE ORDERS (OUTPATIENT)
Dept: LAB | Facility: CLINIC | Age: 83
End: 2018-11-08

## 2018-11-08 ENCOUNTER — APPOINTMENT (OUTPATIENT)
Dept: LAB | Facility: CLINIC | Age: 83
End: 2018-11-08
Payer: MEDICARE

## 2018-11-08 DIAGNOSIS — C61 MALIGNANT NEOPLASM OF PROSTATE (HCC): ICD-10-CM

## 2018-11-08 DIAGNOSIS — C61 MALIGNANT NEOPLASM OF PROSTATE (HCC): Primary | ICD-10-CM

## 2018-11-08 LAB
BUN SERPL-MCNC: 19 MG/DL (ref 5–25)
CREAT SERPL-MCNC: 0.96 MG/DL (ref 0.6–1.3)
GFR SERPL CREATININE-BSD FRML MDRD: 73 ML/MIN/1.73SQ M
PSA SERPL-MCNC: 2.8 NG/ML (ref 0–4)

## 2018-11-08 PROCEDURE — 36415 COLL VENOUS BLD VENIPUNCTURE: CPT

## 2018-11-08 PROCEDURE — 82565 ASSAY OF CREATININE: CPT

## 2018-11-08 PROCEDURE — 84153 ASSAY OF PSA TOTAL: CPT

## 2018-11-08 PROCEDURE — 84520 ASSAY OF UREA NITROGEN: CPT

## 2018-12-06 ENCOUNTER — REMOTE DEVICE CLINIC VISIT (OUTPATIENT)
Dept: CARDIOLOGY CLINIC | Facility: CLINIC | Age: 83
End: 2018-12-06
Payer: MEDICARE

## 2018-12-06 DIAGNOSIS — I49.5 SSS (SICK SINUS SYNDROME) (HCC): Primary | ICD-10-CM

## 2018-12-06 DIAGNOSIS — Z95.0 CARDIAC PACEMAKER IN SITU: ICD-10-CM

## 2018-12-06 PROCEDURE — 93296 REM INTERROG EVL PM/IDS: CPT | Performed by: INTERNAL MEDICINE

## 2018-12-06 PROCEDURE — 93294 REM INTERROG EVL PM/LDLS PM: CPT | Performed by: INTERNAL MEDICINE

## 2018-12-06 NOTE — PROGRESS NOTES
Results for orders placed or performed in visit on 12/06/18   Cardiac EP device report    Narrative    MDT DUAL PPM  CARELINK TRANSMISSION: BATTERY STATUS "OK"  AP 91%  81%  ALL AVAILABLE LEAD PARAMETERS WITHIN NORMAL LIMITS  NO SIGNIFICANT HIGH RATE EPISODES  NORMAL DEVICE FUNCTION   NC

## 2018-12-23 DIAGNOSIS — E78.00 PURE HYPERCHOLESTEROLEMIA: ICD-10-CM

## 2018-12-24 RX ORDER — ATORVASTATIN CALCIUM 10 MG/1
TABLET, FILM COATED ORAL
Qty: 90 TABLET | Refills: 0 | Status: SHIPPED | OUTPATIENT
Start: 2018-12-24 | End: 2019-04-10 | Stop reason: SDUPTHER

## 2019-01-15 DIAGNOSIS — I10 ESSENTIAL (PRIMARY) HYPERTENSION: ICD-10-CM

## 2019-01-15 DIAGNOSIS — I25.10 CORONARY ARTERIOSCLEROSIS: ICD-10-CM

## 2019-01-16 RX ORDER — METOPROLOL TARTRATE 50 MG/1
TABLET, FILM COATED ORAL
Qty: 90 TABLET | Refills: 0 | Status: SHIPPED | OUTPATIENT
Start: 2019-01-16 | End: 2019-04-15 | Stop reason: SDUPTHER

## 2019-01-28 ENCOUNTER — TRANSCRIBE ORDERS (OUTPATIENT)
Dept: LAB | Facility: CLINIC | Age: 84
End: 2019-01-28

## 2019-01-28 ENCOUNTER — APPOINTMENT (OUTPATIENT)
Dept: LAB | Facility: CLINIC | Age: 84
End: 2019-01-28
Payer: MEDICARE

## 2019-01-28 DIAGNOSIS — Z12.5 PROSTATE CANCER SCREENING: Primary | ICD-10-CM

## 2019-01-28 DIAGNOSIS — Z12.5 PROSTATE CANCER SCREENING: ICD-10-CM

## 2019-01-28 LAB — PSA SERPL-MCNC: 2.4 NG/ML (ref 0–4)

## 2019-01-28 PROCEDURE — G0103 PSA SCREENING: HCPCS

## 2019-01-28 PROCEDURE — 36415 COLL VENOUS BLD VENIPUNCTURE: CPT

## 2019-03-04 ENCOUNTER — OFFICE VISIT (OUTPATIENT)
Dept: FAMILY MEDICINE CLINIC | Facility: CLINIC | Age: 84
End: 2019-03-04
Payer: MEDICARE

## 2019-03-04 VITALS
RESPIRATION RATE: 16 BRPM | TEMPERATURE: 96.2 F | WEIGHT: 159 LBS | HEIGHT: 70 IN | BODY MASS INDEX: 22.76 KG/M2 | DIASTOLIC BLOOD PRESSURE: 70 MMHG | SYSTOLIC BLOOD PRESSURE: 132 MMHG | HEART RATE: 78 BPM

## 2019-03-04 DIAGNOSIS — C61 MALIGNANT NEOPLASM OF PROSTATE (HCC): ICD-10-CM

## 2019-03-04 DIAGNOSIS — E78.00 PURE HYPERCHOLESTEROLEMIA: ICD-10-CM

## 2019-03-04 DIAGNOSIS — E11.9 TYPE 2 DIABETES MELLITUS WITHOUT COMPLICATION, WITHOUT LONG-TERM CURRENT USE OF INSULIN (HCC): Primary | ICD-10-CM

## 2019-03-04 DIAGNOSIS — I49.5 SSS (SICK SINUS SYNDROME) (HCC): ICD-10-CM

## 2019-03-04 DIAGNOSIS — I10 ESSENTIAL HYPERTENSION: ICD-10-CM

## 2019-03-04 DIAGNOSIS — M47.816 FACET ARTHRITIS OF LUMBAR REGION: ICD-10-CM

## 2019-03-04 DIAGNOSIS — Z00.00 MEDICARE ANNUAL WELLNESS VISIT, SUBSEQUENT: ICD-10-CM

## 2019-03-04 DIAGNOSIS — I25.10 ARTERIOSCLEROSIS OF CORONARY ARTERY: ICD-10-CM

## 2019-03-04 PROCEDURE — G0439 PPPS, SUBSEQ VISIT: HCPCS | Performed by: FAMILY MEDICINE

## 2019-03-04 PROCEDURE — 99214 OFFICE O/P EST MOD 30 MIN: CPT | Performed by: FAMILY MEDICINE

## 2019-03-04 NOTE — PROGRESS NOTES
Assessment and Plan:    Problem List Items Addressed This Visit        Endocrine    Type 2 diabetes mellitus ([address] ) - Primary    Relevant Orders    Hemoglobin A1C    Microalbumin / creatinine urine ratio       Cardiovascular and Mediastinum    Arteriosclerosis of coronary artery    Hypertension    Relevant Orders    CBC and differential    Comprehensive metabolic panel    SSS (sick sinus syndrome) (HCC)       Musculoskeletal and Integument    Facet arthritis of lumbar region ([address] )       Other    Hyperlipidemia    Relevant Orders    Lipid panel    TSH, 3rd generation with Free T4 reflex      Other Visit Diagnoses     Malignant neoplasm of prostate ([facility] )        Medicare annual wellness visit, subsequent            Health Maintenance Due   Topic Date Due    HEPATITIS B VACCINES (1 of 3 - Risk 3-dose series) 11/16/1953    HEMOGLOBIN A1C  08/27/2018    DM Eye Exam  11/27/2018    Fall Risk  02/26/2019    Depression Screening PHQ  02/26/2019    Medicare Annual Wellness Visit (AWV)  02/26/2019    URINE MICROALBUMIN  02/27/2019     A1c and urine micro albumin ordered  He is due for eye exam  Depression screen and fall risk performed today  Hep B deferred  HPI:  Pratima Gongora  is a 80 y o  male here for his Subsequent Wellness Visit      Patient Active Problem List   Diagnosis    Aortic valve regurgitation    Arteriosclerosis of coronary artery    Carpal tunnel syndrome    Disc degeneration, lumbar    Facet arthritis of lumbar region ([facility] )    Glaucoma    Hyperlipidemia    Hypertension    Lumbar canal stenosis    Mitral regurgitation    Prostate cancer ([facility] [address] )    Type 2 diabetes mellitus ([address] )    SSS (sick sinus syndrome) ([address] )     Past Medical History:   Diagnosis Date    Basal cell carcinoma     Benign polyp of large intestine     Osteoarthritis of left hip     last assessed [id number]    Piriformis syndrome of left side     last assessed [id number]     Past Surgical History:   Procedure Laterality Date    CARDIAC PACEMAKER PLACEMENT  05/01/2005    CATARACT EXTRACTION Left 10/01/1995    CATARACT EXTRACTION Right 02/01/2000    CORONARY ANGIOPLASTY  01/01/2010 2010    CORONARY ANGIOPLASTY WITH STENT PLACEMENT  11/01/2002    INGUINAL HERNIA REPAIR Right 01/01/1998    INGUINAL HERNIA REPAIR Left 01/01/2009    2009    LUMBAR LAMINECTOMY  01/01/2002    2002    REPLACEMENT TOTAL KNEE Right 06/01/2010    right total knee replacement with complications    RHIZOTOMY      TONSILLECTOMY AND ADENOIDECTOMY  01/01/1937    1937    TOTAL HIP ARTHROPLASTY Left 01/18/2017     Family History   Problem Relation Age of Onset    Coronary artery disease Mother     Diabetes Maternal Grandmother      Social History     Tobacco Use   Smoking Status Never Smoker   Smokeless Tobacco Never Used     Social History     Substance and Sexual Activity   Alcohol Use No      Social History     Substance and Sexual Activity   Drug Use No       Current Outpatient Medications   Medication Sig Dispense Refill    aspirin 81 MG tablet Take 1 tablet by mouth once a week       atorvastatin (LIPITOR) 10 mg tablet TAKE 1 TABLET DAILY 90 tablet 0    glimepiride (AMARYL) 1 mg tablet Take 1 mg by mouth every morning before breakfast       glucose blood (ONE TOUCH ULTRA TEST) test strip Patient tests once daily  50 each 3    Glucose Blood (ONETOUCH ULTRA BLUE VI) by In Vitro route daily      metoprolol tartrate (LOPRESSOR) 25 mg tablet TAKE 1 TABLET DAILY IN THE EVENING 90 tablet 0    metoprolol tartrate (LOPRESSOR) 50 mg tablet TAKE 1 TABLET DAILY IN THE MORNING 90 tablet 0    Multiple Vitamins-Minerals (MULTIVITAMIN ADULT PO) Take 1 tablet by mouth every other day   ONETOUCH DELICA LANCETS 49Q MISC by Does not apply route       No current facility-administered medications for this visit        Allergies   Allergen Reactions    Penicillins     Plavix [Clopidogrel]      Immunization History   Administered Date(s) Administered    Influenza Split High Dose Preservative Free IM 12/04/2012, 12/13/2013, 10/01/2014, 09/24/2015, 11/09/2016, 11/02/2017    Influenza, high dose seasonal 0 5 mL 10/26/2018    Pneumococcal Conjugate 13-Valent 02/23/2016    Pneumococcal Polysaccharide PPV23 12/13/2011    Zoster 05/30/2013       Patient Care Team:  Julianna Castanon MD as PCP - General  MD Bibi Blevins MD Jene Candy, MD (Urology)  Enedina Yip MD (Dermatology)  Latonia Palomares DPM (Podiatry)    Medicare Screening Tests and Risk Assessments:  King Diallo is here for his Subsequent Wellness visit  Last Medicare Wellness visit information reviewed, patient interviewed and updates made to the record today  Health Risk Assessment:  Patient rates overall health as very good  Patient feels that their physical health rating is Same  Eyesight was rated as Same  Hearing was rated as Same  Patient feels that their emotional and mental health rating is Same  Pain experienced by patient in the last 7 days has been None  Patient states that he has experienced no weight loss or gain in last 6 months  Emotional/Mental Health:  Patient has been feeling nervous/anxious  PHQ-9 Depression Screening:    Frequency of the following problems over the past two weeks:      1  Little interest or pleasure in doing things: 0 - not at all      2  Feeling down, depressed, or hopeless: 0 - not at all  PHQ-2 Score: 0          Broken Bones/Falls: Fall Risk Assessment:    In the past year, patient has experienced: No history of falling in past year          Bladder/Bowel:  Patient has leaked urine accidently in the last six months  Patient reports no loss of bowel control  (Additional Comments: Prostate CA s/p XRT)    Immunizations:  Patient has had a flu vaccination within the last year  Patient has received a pneumonia shot  Patient has received a shingles shot  Patient has not received tetanus/diphtheria shot       Home Safety:  Patient does not have trouble with stairs inside or outside of their home  Patient currently reports that there are no safety hazards present in home, working smoke alarms, working carbon monoxide detectors  Preventative Screenings:   prostate cancer screen performed, colon cancer screen completed, cholesterol screen completed, glaucoma eye exam completed, (Additional Comments: Prostate CA s/p XRT  Colonoscopy 04/2016  Lipid profile 02/2018  Eye exam 11/2017 )    Nutrition:  Current diet: Regular, Diabetic, Low Carb, Limited junk food and No Added Salt with servings of the following:    Medications:  Patient is currently taking over-the-counter supplements  List of OTC medications includes: MVI, ASA 81 mg daily   Patient is able to manage medications  Activities of Daily Living:  Can get out of bed by his or her self, able to dress self, able to make own meals, able to do own shopping, able to bathe self, can do own laundry/housekeeping, can manage own money, pay bills and track expenses    Previous Hospitalizations:  No hospitalization or ED visit in past 12 months        Advanced Directives:  Patient has decided on a power of   Patient has spoken to designated power of   Patient has completed advanced directive          Preventative Screening/Counseling:      Cardiovascular:      General: Screening Not Indicated      Counseling: Healthy Diet and Healthy Weight          Diabetes:      General: Screening Not Indicated          Colorectal Cancer:      General: Screening Current          Prostate Cancer:      General: Screening Not Indicated          Osteoporosis:      General: Screening Not Indicated          AAA:      General: Screening Not Indicated          Glaucoma:      Referrals: Ophthalmology          HIV:      General: Screening Not Indicated          Hepatitis C:      General: Screening Not Indicated        Advanced Directives:   Patient has living will for healthcare, has durable POA for healthcare, patient has an advanced directive       Immunizations:      Influenza: Influenza UTD This Year      Pneumococcal: Lifetime Vaccine Completed      Zostavax: Zostavax Vaccine UTD      TDAP: Risks & Benefits Discussed and Patient Declines

## 2019-03-04 NOTE — PROGRESS NOTES
Assessment/Plan:         Diagnoses and all orders for this visit:    Type 2 diabetes mellitus without complication, without long-term current use of insulin (HCC)  -     Hemoglobin A1C  -     Microalbumin / creatinine urine ratio    Facet arthritis of lumbar region ([address] )    SSS (sick sinus syndrome) (HCC)    Malignant neoplasm of prostate (HCC)    Essential hypertension  -     CBC and differential  -     Comprehensive metabolic panel    Pure hypercholesterolemia  -     Lipid panel  -     TSH, 3rd generation with Free T4 reflex    Arteriosclerosis of coronary artery    Medicare annual wellness visit, subsequent        Continue with current medications  Repeat labs  Follow up visit with Cardiology and Urology  Patient ID: Ludy Hayden is a 80 y o  male  Follow up visit  Medications reviewed Type 2 diabetes mellitus  On Glimepiride 1 mg daily  02/2018  A1c 5 9  Urine microalbuminin < 5 0  Not on ACE or ARB  No hypoglycemic events  No neuropathy symptoms  Up to date with eye exam  Hypertension blood pressures have been stable on current regimen  Creatinine 0 84  Electrolytes normal  Hemoglobin 13 5  Hyperlipidemia and CAD on Atorvastatin 10 mg daily lipid profile 02/2018  Cholesterol 130  Triglycerides 69  HDL 44  LDL 72  The following portions of the patient's history were reviewed and updated as appropriate: allergies, current medications, past family history, past medical history, past social history, past surgical history and problem list     Review of Systems   Constitutional: Negative for appetite change, chills, fever and unexpected weight change  HENT: Negative for congestion, ear pain, rhinorrhea, sore throat and trouble swallowing  Eyes: Negative for visual disturbance  Respiratory: Negative for cough, shortness of breath and wheezing  Cardiovascular: Negative for chest pain, palpitations and leg swelling  CAD s/p stents  S/p pacemaker  02/2018    Echocardiogram normal left ventricular systolic function  EF 55%  No regional wall motion abnormalities  Grade 1 diastolic dysfunction  mild mitral regurgitation  Mild aortic regurgitation  Trace TR/OH  No pericardial effusion  Aortic root normal size  Gastrointestinal: Negative for abdominal pain, blood in stool, constipation, diarrhea, nausea and vomiting  Colonoscopy 04/2016   Genitourinary: Negative for difficulty urinating  Prostate CA s/p XRT  01/2019 PSA 2 4  11/2018 PSA 2 8  11/2017 PSA 1 7  11/2016 PSA 1 4  Musculoskeletal: Negative for arthralgias and myalgias  OA S/p left THR 01/2017  Skin: Negative for rash  History of BCCs/SCCs   Allergic/Immunologic: Negative for environmental allergies  Neurological: Negative for dizziness and headaches  No recent falls  Hematological: Negative for adenopathy  Does not bruise/bleed easily  Psychiatric/Behavioral: Negative for dysphoric mood and sleep disturbance  Objective:      /70 (BP Location: Left arm, Patient Position: Sitting, Cuff Size: Large)   Pulse 78   Temp (!) 96 2 °F (35 7 °C)   Resp 16   Ht 5' 10" (1 778 m)   Wt 72 1 kg (159 lb)   BMI 22 81 kg/m²          Physical Exam   Constitutional: He is oriented to person, place, and time  He appears well-developed and well-nourished  No distress  HENT:   Right Ear: Tympanic membrane normal    Left Ear: Tympanic membrane normal    Mouth/Throat: Oropharynx is clear and moist    Eyes: Pupils are equal, round, and reactive to light  Conjunctivae and EOM are normal  No scleral icterus  Neck: No JVD present  Carotid bruit is not present  No tracheal deviation present  No thyroid mass and no thyromegaly present  Cardiovascular: Normal rate, regular rhythm, normal heart sounds and intact distal pulses  Exam reveals no gallop  Pulses are no weak pulses  No murmur heard  Pulses:       Carotid pulses are 2+ on the right side, and 2+ on the left side  Dorsalis pedis pulses are 2+ on the right side, and 2+ on the left side  Posterior tibial pulses are 2+ on the right side, and 2+ on the left side  Pulmonary/Chest: Effort normal and breath sounds normal  No respiratory distress  He has no wheezes  He has no rales  Abdominal: Soft  Bowel sounds are normal  He exhibits no distension and no mass  There is no hepatosplenomegaly  There is no tenderness  There is no rebound and no guarding  Musculoskeletal: He exhibits no edema  Feet:   Right Foot:   Skin Integrity: Negative for ulcer, skin breakdown, erythema, warmth, callus or dry skin  Left Foot:   Skin Integrity: Negative for ulcer, skin breakdown, erythema, warmth, callus or dry skin  Lymphadenopathy:     He has no cervical adenopathy  Neurological: He is alert and oriented to person, place, and time  He has normal reflexes  No cranial nerve deficit  Skin: No rash noted  Psychiatric: He has a normal mood and affect  Cognition and memory are normal    Nursing note and vitals reviewed  Patient's shoes and socks removed  Right Foot/Ankle   Right Foot Inspection  Skin Exam: skin normal and skin intact no dry skin, no warmth, no callus, no erythema, no maceration, no abnormal color, no pre-ulcer, no ulcer and no callus                          Toe Exam: ROM and strength within normal limits  Sensory       Monofilament testing: diminished  Vascular  Capillary refills: < 3 seconds  The right DP pulse is 2+  The right PT pulse is 2+  Left Foot/Ankle  Left Foot Inspection  Skin Exam: skin normal and skin intactno dry skin, no warmth, no erythema, no maceration, normal color, no pre-ulcer, no ulcer and no callus                         Toe Exam: ROM and strength within normal limits                   Sensory       Monofilament: diminished  Vascular  Capillary refills: < 3 seconds  The left DP pulse is 2+  The left PT pulse is 2+  Assign Risk Category:  No deformity present;  No loss of protective sensation;  No weak pulses       Risk: 0

## 2019-03-07 ENCOUNTER — REMOTE DEVICE CLINIC VISIT (OUTPATIENT)
Dept: CARDIOLOGY CLINIC | Facility: CLINIC | Age: 84
End: 2019-03-07
Payer: MEDICARE

## 2019-03-07 DIAGNOSIS — Z95.0 CARDIAC PACEMAKER IN SITU: ICD-10-CM

## 2019-03-07 DIAGNOSIS — I49.5 SSS (SICK SINUS SYNDROME) (HCC): Primary | ICD-10-CM

## 2019-03-07 PROCEDURE — 93296 REM INTERROG EVL PM/IDS: CPT | Performed by: INTERNAL MEDICINE

## 2019-03-07 PROCEDURE — 93294 REM INTERROG EVL PM/LDLS PM: CPT | Performed by: INTERNAL MEDICINE

## 2019-03-08 ENCOUNTER — APPOINTMENT (OUTPATIENT)
Dept: LAB | Facility: CLINIC | Age: 84
End: 2019-03-08
Payer: MEDICARE

## 2019-03-08 LAB
ALBUMIN SERPL BCP-MCNC: 4 G/DL (ref 3.5–5)
ALP SERPL-CCNC: 56 U/L (ref 46–116)
ALT SERPL W P-5'-P-CCNC: 27 U/L (ref 12–78)
ANION GAP SERPL CALCULATED.3IONS-SCNC: 4 MMOL/L (ref 4–13)
AST SERPL W P-5'-P-CCNC: 19 U/L (ref 5–45)
BASOPHILS # BLD AUTO: 0.03 THOUSANDS/ΜL (ref 0–0.1)
BASOPHILS NFR BLD AUTO: 1 % (ref 0–1)
BILIRUB SERPL-MCNC: 0.65 MG/DL (ref 0.2–1)
BUN SERPL-MCNC: 20 MG/DL (ref 5–25)
CALCIUM SERPL-MCNC: 8.9 MG/DL (ref 8.3–10.1)
CHLORIDE SERPL-SCNC: 106 MMOL/L (ref 100–108)
CHOLEST SERPL-MCNC: 138 MG/DL (ref 50–200)
CO2 SERPL-SCNC: 29 MMOL/L (ref 21–32)
CREAT SERPL-MCNC: 0.93 MG/DL (ref 0.6–1.3)
CREAT UR-MCNC: 104 MG/DL
EOSINOPHIL # BLD AUTO: 0.25 THOUSAND/ΜL (ref 0–0.61)
EOSINOPHIL NFR BLD AUTO: 5 % (ref 0–6)
ERYTHROCYTE [DISTWIDTH] IN BLOOD BY AUTOMATED COUNT: 13.3 % (ref 11.6–15.1)
EST. AVERAGE GLUCOSE BLD GHB EST-MCNC: 128 MG/DL
GFR SERPL CREATININE-BSD FRML MDRD: 75 ML/MIN/1.73SQ M
GLUCOSE P FAST SERPL-MCNC: 105 MG/DL (ref 65–99)
HBA1C MFR BLD: 6.1 % (ref 4.2–6.3)
HCT VFR BLD AUTO: 44.2 % (ref 36.5–49.3)
HDLC SERPL-MCNC: 46 MG/DL (ref 40–60)
HGB BLD-MCNC: 14.3 G/DL (ref 12–17)
IMM GRANULOCYTES # BLD AUTO: 0.01 THOUSAND/UL (ref 0–0.2)
IMM GRANULOCYTES NFR BLD AUTO: 0 % (ref 0–2)
LDLC SERPL CALC-MCNC: 82 MG/DL (ref 0–100)
LYMPHOCYTES # BLD AUTO: 0.71 THOUSANDS/ΜL (ref 0.6–4.47)
LYMPHOCYTES NFR BLD AUTO: 14 % (ref 14–44)
MCH RBC QN AUTO: 30.9 PG (ref 26.8–34.3)
MCHC RBC AUTO-ENTMCNC: 32.4 G/DL (ref 31.4–37.4)
MCV RBC AUTO: 96 FL (ref 82–98)
MICROALBUMIN UR-MCNC: 5.7 MG/L (ref 0–20)
MICROALBUMIN/CREAT 24H UR: 5 MG/G CREATININE (ref 0–30)
MONOCYTES # BLD AUTO: 0.51 THOUSAND/ΜL (ref 0.17–1.22)
MONOCYTES NFR BLD AUTO: 10 % (ref 4–12)
NEUTROPHILS # BLD AUTO: 3.76 THOUSANDS/ΜL (ref 1.85–7.62)
NEUTS SEG NFR BLD AUTO: 70 % (ref 43–75)
NONHDLC SERPL-MCNC: 92 MG/DL
NRBC BLD AUTO-RTO: 0 /100 WBCS
PLATELET # BLD AUTO: 189 THOUSANDS/UL (ref 149–390)
PMV BLD AUTO: 11.5 FL (ref 8.9–12.7)
POTASSIUM SERPL-SCNC: 4.4 MMOL/L (ref 3.5–5.3)
PROT SERPL-MCNC: 7.3 G/DL (ref 6.4–8.2)
RBC # BLD AUTO: 4.63 MILLION/UL (ref 3.88–5.62)
SODIUM SERPL-SCNC: 139 MMOL/L (ref 136–145)
TRIGL SERPL-MCNC: 49 MG/DL
TSH SERPL DL<=0.05 MIU/L-ACNC: 1.78 UIU/ML (ref 0.36–3.74)
WBC # BLD AUTO: 5.27 THOUSAND/UL (ref 4.31–10.16)

## 2019-03-08 PROCEDURE — 82570 ASSAY OF URINE CREATININE: CPT | Performed by: FAMILY MEDICINE

## 2019-03-08 PROCEDURE — 80061 LIPID PANEL: CPT | Performed by: FAMILY MEDICINE

## 2019-03-08 PROCEDURE — 83036 HEMOGLOBIN GLYCOSYLATED A1C: CPT | Performed by: FAMILY MEDICINE

## 2019-03-08 PROCEDURE — 85025 COMPLETE CBC W/AUTO DIFF WBC: CPT | Performed by: FAMILY MEDICINE

## 2019-03-08 PROCEDURE — 36415 COLL VENOUS BLD VENIPUNCTURE: CPT | Performed by: FAMILY MEDICINE

## 2019-03-08 PROCEDURE — 80053 COMPREHEN METABOLIC PANEL: CPT | Performed by: FAMILY MEDICINE

## 2019-03-08 PROCEDURE — 82043 UR ALBUMIN QUANTITATIVE: CPT | Performed by: FAMILY MEDICINE

## 2019-03-08 PROCEDURE — 84443 ASSAY THYROID STIM HORMONE: CPT | Performed by: FAMILY MEDICINE

## 2019-03-11 NOTE — PROGRESS NOTES
Results for orders placed or performed in visit on 03/07/19   Cardiac EP device report    Narrative    MDT DUAL PPM  CARELINK TRANSMISSION: BATTERY STATUS "OK"  AP 92%  82%  ALL AVAILABLE LEAD PARAMETERS WITHIN NORMAL LIMITS  NO SIGNIFICANT HIGH RATE EPISODES  NORMAL DEVICE FUNCTION   NC

## 2019-03-15 NOTE — PROGRESS NOTES
Cardiology Follow Up    Sam Carpenter    1934  [id number]  [facility]  [facility] [phone]    1  Cardiac pacemaker in situ     2  Essential (primary) hypertension     3  Pure hypercholesterolemia     4  Coronary arteriosclerosis         Interval History:  Patient is here for a follow-up visit  He was most recently seen by me in September of last year  He has had ongoing interrogation of his permanent pacemaker  His most recent pacer check in March of this year demonstrated an appropriately functioning device with no significant dysrhythmia noted  He has a prior history of coronary disease with PCI  Most recent echocardiogram done February of last year demonstrated preserved LV systolic function and no significant valvular heart disease  He has been feeling well  He has had no chest pain or significant dyspnea  He is interested in vascular screening and I will check a carotid Doppler and assess for an abdominal aortic aneurysm      Patient Active Problem List   Diagnosis    Aortic valve regurgitation    Arteriosclerosis of coronary artery    Carpal tunnel syndrome    Disc degeneration, lumbar    Facet arthritis of lumbar region ([address] )    Glaucoma    Hyperlipidemia    Hypertension    Lumbar canal stenosis    Mitral regurgitation    [facility]    Type 2 diabetes mellitus ([facility] [address] [facility] )    SSS (sick sinus syndrome) ([facility])     Past Medical History:   Diagnosis Date    Basal cell carcinoma     Benign polyp of large intestine     Osteoarthritis of left hip     last assessed [id number]    Piriformis syndrome of left side     last assessed [id number]     Social History     Socioeconomic History    Marital status: /Civil Union     Spouse name: Not on file    Number of children: Not on file    Years of education: Not on file    Highest education level: Not on file   Occupational History    Not on file   Social Needs    Financial resource strain: Not on file    Food insecurity:     Worry: Not on file     Inability: Not on file    Transportation needs:     Medical: Not on file     Non-medical: Not on file   Tobacco Use    Smoking status: Never Smoker    Smokeless tobacco: Never Used   Substance and Sexual Activity    Alcohol use: No    Drug use: No    Sexual activity: Not on file   Lifestyle    Physical activity:     Days per week: Not on file     Minutes per session: Not on file    Stress: Not on file   Relationships    Social connections:     Talks on phone: Not on file     Gets together: Not on file     Attends Restorationism service: Not on file     Active member of club or organization: Not on file     Attends meetings of clubs or organizations: Not on file     Relationship status: Not on file    Intimate partner violence:     Fear of current or ex partner: Not on file     Emotionally abused: Not on file     Physically abused: Not on file     Forced sexual activity: Not on file   Other Topics Concern    Not on file   Social History Narrative    Not on file      Family History   Problem Relation Age of Onset    Coronary artery disease Mother     Diabetes Maternal Grandmother      Past Surgical History:   Procedure Laterality Date    CARDIAC PACEMAKER PLACEMENT  05/01/2005    CATARACT EXTRACTION Left 10/01/1995    CATARACT EXTRACTION Right 02/01/2000    CORONARY ANGIOPLASTY  01/01/2010 2010    CORONARY ANGIOPLASTY WITH STENT PLACEMENT  11/01/2002    INGUINAL HERNIA REPAIR Right 01/01/1998    INGUINAL HERNIA REPAIR Left 01/01/2009 2009    LUMBAR LAMINECTOMY  01/01/2002    2002    REPLACEMENT TOTAL KNEE Right 06/01/2010    right total knee replacement with complications    RHIZOTOMY      TONSILLECTOMY AND ADENOIDECTOMY  01/01/1937    1937    TOTAL HIP ARTHROPLASTY Left 01/18/2017       Current Outpatient Medications:     atorvastatin (LIPITOR) 10 mg tablet, TAKE 1 TABLET DAILY, Disp: 90 tablet, Rfl: 0    glimepiride (AMARYL) 1 mg tablet, Take 1 mg by mouth every morning before breakfast , Disp: , Rfl:     glucose blood (ONE TOUCH ULTRA TEST) test strip, Patient tests once daily  , Disp: 50 each, Rfl: 3    Glucose Blood (ONETOUCH ULTRA BLUE VI), by In Vitro route daily, Disp: , Rfl:     metoprolol tartrate (LOPRESSOR) 25 mg tablet, TAKE 1 TABLET DAILY IN THE EVENING, Disp: 90 tablet, Rfl: 0    metoprolol tartrate (LOPRESSOR) 50 mg tablet, TAKE 1 TABLET DAILY IN THE MORNING, Disp: 90 tablet, Rfl: 0    Multiple Vitamins-Minerals (MULTIVITAMIN ADULT PO), Take 1 tablet by mouth every other day , Disp: , Rfl:     ONETOUCH DELICA LANCETS 47R MISC, by Does not apply route, Disp: , Rfl:     aspirin 81 MG tablet, Take 1 tablet by mouth once a week , Disp: , Rfl:     cephalexin (KEFLEX) 500 mg capsule, , Disp: , Rfl: 0  Allergies   Allergen Reactions    Penicillins     Plavix [Clopidogrel]        Labs:not applicable  Imaging: No results found  Review of Systems:  Review of Systems   All other systems reviewed and are negative  Physical Exam:  /80 (BP Location: Left arm, Patient Position: Sitting, Cuff Size: Standard)   Pulse 66   Ht 5' 10" (1 778 m)   Wt 70 1 kg (154 lb 8 oz)   SpO2 99%   BMI 22 17 kg/m²   Physical Exam   Constitutional: He is oriented to person, place, and time  He appears well-developed and well-nourished  HENT:   Head: Normocephalic and atraumatic  Eyes: Pupils are equal, round, and reactive to light  Conjunctivae are normal    Neck: Normal range of motion  Neck supple  Cardiovascular: Normal rate and normal heart sounds  Pulmonary/Chest: Effort normal and breath sounds normal    Neurological: He is alert and oriented to person, place, and time  Skin: Skin is warm and dry  Psychiatric: He has a normal mood and affect  Vitals reviewed        Discussion/Summary:I will continue the patient's present medical regimen  The patient appears well compensated  I have asked the patient to call if there is a problem in the interim otherwise I will see the patient in six months time  In reference to vascular screening I will check a carotid Doppler and assess for an abdominal aortic aneurysm

## 2019-03-19 ENCOUNTER — OFFICE VISIT (OUTPATIENT)
Dept: CARDIOLOGY CLINIC | Facility: CLINIC | Age: 84
End: 2019-03-19
Payer: MEDICARE

## 2019-03-19 VITALS
DIASTOLIC BLOOD PRESSURE: 80 MMHG | OXYGEN SATURATION: 99 % | HEIGHT: 70 IN | BODY MASS INDEX: 22.12 KG/M2 | WEIGHT: 154.5 LBS | HEART RATE: 66 BPM | SYSTOLIC BLOOD PRESSURE: 134 MMHG

## 2019-03-19 DIAGNOSIS — I25.10 CORONARY ARTERIOSCLEROSIS: ICD-10-CM

## 2019-03-19 DIAGNOSIS — I10 ESSENTIAL (PRIMARY) HYPERTENSION: ICD-10-CM

## 2019-03-19 DIAGNOSIS — Z95.0 CARDIAC PACEMAKER IN SITU: Primary | ICD-10-CM

## 2019-03-19 DIAGNOSIS — E78.00 PURE HYPERCHOLESTEROLEMIA: ICD-10-CM

## 2019-03-19 PROCEDURE — 99214 OFFICE O/P EST MOD 30 MIN: CPT | Performed by: INTERNAL MEDICINE

## 2019-03-19 RX ORDER — CEPHALEXIN 500 MG/1
CAPSULE ORAL
Refills: 0 | COMMUNITY
Start: 2019-03-16

## 2019-03-19 NOTE — PATIENT INSTRUCTIONS
I will continue the patient's present medical regimen  The patient appears well compensated  I have asked the patient to call if there is a problem in the interim otherwise I will see the patient in six months time  I ordered testing in reference to vascular screening as we discussed

## 2019-04-10 DIAGNOSIS — E78.00 PURE HYPERCHOLESTEROLEMIA: ICD-10-CM

## 2019-04-11 RX ORDER — ATORVASTATIN CALCIUM 10 MG/1
TABLET, FILM COATED ORAL
Qty: 90 TABLET | Refills: 0 | Status: SHIPPED | OUTPATIENT
Start: 2019-04-11 | End: 2019-06-17 | Stop reason: SDUPTHER

## 2019-04-12 ENCOUNTER — HOSPITAL ENCOUNTER (OUTPATIENT)
Dept: NON INVASIVE DIAGNOSTICS | Facility: CLINIC | Age: 84
Discharge: HOME | End: 2019-04-12
Payer: MEDICARE

## 2019-04-12 DIAGNOSIS — I25.10 CORONARY ARTERIOSCLEROSIS: ICD-10-CM

## 2019-04-12 DIAGNOSIS — E78.00 PURE HYPERCHOLESTEROLEMIA: ICD-10-CM

## 2019-04-12 DIAGNOSIS — I10 ESSENTIAL (PRIMARY) HYPERTENSION: ICD-10-CM

## 2019-04-12 DIAGNOSIS — Z95.0 CARDIAC PACEMAKER IN SITU: ICD-10-CM

## 2019-04-12 PROCEDURE — 93978 VASCULAR STUDY: CPT | Performed by: SURGERY

## 2019-04-12 PROCEDURE — 93880 EXTRACRANIAL BILAT STUDY: CPT

## 2019-04-12 PROCEDURE — 93880 EXTRACRANIAL BILAT STUDY: CPT | Performed by: SURGERY

## 2019-04-12 PROCEDURE — 93978 VASCULAR STUDY: CPT

## 2019-04-15 DIAGNOSIS — I10 ESSENTIAL (PRIMARY) HYPERTENSION: ICD-10-CM

## 2019-04-15 DIAGNOSIS — E11.9 TYPE 2 DIABETES MELLITUS WITHOUT COMPLICATION, WITHOUT LONG-TERM CURRENT USE OF INSULIN (HCC): Primary | ICD-10-CM

## 2019-04-15 DIAGNOSIS — I25.10 CORONARY ARTERIOSCLEROSIS: ICD-10-CM

## 2019-04-15 RX ORDER — GLIMEPIRIDE 1 MG/1
TABLET ORAL
Qty: 90 TABLET | Refills: 3 | Status: SHIPPED | OUTPATIENT
Start: 2019-04-15 | End: 2020-04-19

## 2019-04-15 RX ORDER — METOPROLOL TARTRATE 50 MG/1
TABLET, FILM COATED ORAL
Qty: 90 TABLET | Refills: 0 | Status: SHIPPED | OUTPATIENT
Start: 2019-04-15 | End: 2019-06-23 | Stop reason: SDUPTHER

## 2019-04-16 ENCOUNTER — TELEPHONE (OUTPATIENT)
Dept: CARDIOLOGY CLINIC | Facility: CLINIC | Age: 84
End: 2019-04-16

## 2019-06-04 ENCOUNTER — IN-CLINIC DEVICE VISIT (OUTPATIENT)
Dept: CARDIOLOGY CLINIC | Facility: MEDICAL CENTER | Age: 84
End: 2019-06-04
Payer: MEDICARE

## 2019-06-04 DIAGNOSIS — Z95.0 PRESENCE OF PERMANENT CARDIAC PACEMAKER: ICD-10-CM

## 2019-06-04 DIAGNOSIS — I44.1 SECOND DEGREE AV BLOCK: Primary | ICD-10-CM

## 2019-06-04 PROCEDURE — 93280 PM DEVICE PROGR EVAL DUAL: CPT | Performed by: INTERNAL MEDICINE

## 2019-06-17 DIAGNOSIS — E78.00 PURE HYPERCHOLESTEROLEMIA: ICD-10-CM

## 2019-06-18 RX ORDER — ATORVASTATIN CALCIUM 10 MG/1
TABLET, FILM COATED ORAL
Qty: 90 TABLET | Refills: 0 | Status: SHIPPED | OUTPATIENT
Start: 2019-06-18 | End: 2019-09-20 | Stop reason: SDUPTHER

## 2019-06-23 DIAGNOSIS — I10 ESSENTIAL (PRIMARY) HYPERTENSION: ICD-10-CM

## 2019-06-23 DIAGNOSIS — I25.10 CORONARY ARTERIOSCLEROSIS: ICD-10-CM

## 2019-06-24 RX ORDER — METOPROLOL TARTRATE 50 MG/1
TABLET, FILM COATED ORAL
Qty: 90 TABLET | Refills: 0 | Status: SHIPPED | OUTPATIENT
Start: 2019-06-24 | End: 2019-11-13 | Stop reason: SDUPTHER

## 2019-08-12 ENCOUNTER — APPOINTMENT (OUTPATIENT)
Dept: LAB | Facility: CLINIC | Age: 84
End: 2019-08-12
Payer: MEDICARE

## 2019-08-12 ENCOUNTER — TRANSCRIBE ORDERS (OUTPATIENT)
Dept: LAB | Facility: CLINIC | Age: 84
End: 2019-08-12

## 2019-08-12 DIAGNOSIS — N13.8 ENLARGED PROSTATE WITH URINARY OBSTRUCTION: ICD-10-CM

## 2019-08-12 DIAGNOSIS — C61 MALIGNANT NEOPLASM OF PROSTATE (HCC): ICD-10-CM

## 2019-08-12 DIAGNOSIS — N40.1 ENLARGED PROSTATE WITH URINARY OBSTRUCTION: ICD-10-CM

## 2019-08-12 DIAGNOSIS — C61 MALIGNANT NEOPLASM OF PROSTATE (HCC): Primary | ICD-10-CM

## 2019-08-12 LAB — PSA SERPL-MCNC: 3.7 NG/ML (ref 0–4)

## 2019-08-12 PROCEDURE — 84153 ASSAY OF PSA TOTAL: CPT

## 2019-08-15 ENCOUNTER — TELEPHONE (OUTPATIENT)
Dept: FAMILY MEDICINE CLINIC | Facility: CLINIC | Age: 84
End: 2019-08-15

## 2019-08-29 LAB
LEFT EYE DIABETIC RETINOPATHY: NORMAL
RIGHT EYE DIABETIC RETINOPATHY: NORMAL

## 2019-09-05 ENCOUNTER — REMOTE DEVICE CLINIC VISIT (OUTPATIENT)
Dept: CARDIOLOGY CLINIC | Facility: CLINIC | Age: 84
End: 2019-09-05
Payer: MEDICARE

## 2019-09-05 DIAGNOSIS — Z95.0 CARDIAC PACEMAKER IN SITU: Primary | ICD-10-CM

## 2019-09-05 PROCEDURE — 93296 REM INTERROG EVL PM/IDS: CPT | Performed by: INTERNAL MEDICINE

## 2019-09-05 PROCEDURE — 93294 REM INTERROG EVL PM/LDLS PM: CPT | Performed by: INTERNAL MEDICINE

## 2019-09-05 NOTE — PROGRESS NOTES
Results for orders placed or performed in visit on 09/05/19   Cardiac EP device report    Narrative    MDT DUAL PPM  CARELINK TRANSMISSION: BATTERY VOLTAGE ADEQUATE (10 5 YRS)  AP-92%, -74% (>40% [email or web address])  ALL AVAILABLE LEAD PARAMETERS WITHIN NORMAL LIMITS  NO SIGNIFICANT HIGH RATE EPISODES  NORMAL DEVICE FUNCTION   GV

## 2019-09-20 DIAGNOSIS — E78.00 PURE HYPERCHOLESTEROLEMIA: ICD-10-CM

## 2019-09-20 RX ORDER — ATORVASTATIN CALCIUM 10 MG/1
TABLET, FILM COATED ORAL
Qty: 90 TABLET | Refills: 4 | Status: SHIPPED | OUTPATIENT
Start: 2019-09-20 | End: 2020-10-12

## 2019-10-06 NOTE — PROGRESS NOTES
Cardiology Follow Up    Jasson Bermudez    1934  [id number]  [facility]  [address]  [address]  [phone]  [phone]    1  Essential (primary) hypertension     2  Pure hypercholesterolemia     3  Cardiac pacemaker in situ     4  Non-rheumatic mitral regurgitation     5  Coronary arteriosclerosis         Interval History:  Patient is here for a follow-up visit  He was last seen by me in March 2019  His most recent [employer] pacer check September 5th demonstrated an appropriately functioning device with no significant arrhythmia noted  He has a prior history of coronary disease with PCI  Most recent echocardiogram done February 2018 demonstrated preserved LV systolic function and no significant valvular heart disease  Patient has had no chest pain or significant dyspnea  His vital signs are stable      Patient Active Problem List   Diagnosis    Aortic valve regurgitation    Arteriosclerosis of coronary artery    Carpal tunnel syndrome    Disc degeneration, lumbar    Facet arthritis of lumbar region    Glaucoma    Hyperlipidemia    Hypertension    Lumbar canal stenosis    Mitral regurgitation    Prostate cancer (HCC)    Type 2 diabetes mellitus (HCC)    SSS (sick sinus syndrome) (HCC)     Past Medical History:   Diagnosis Date    Basal cell carcinoma     Benign polyp of large intestine     Osteoarthritis of left hip     last assessed [id number]    Piriformis syndrome of left side     last assessed [id number]     Social History     Socioeconomic History    Marital status: /Civil Union     Spouse name: Not on file    Number of children: Not on file    Years of education: Not on file    Highest education level: Not on file   Occupational History    Not on file   Social Needs    Financial resource strain: Not on file    Food insecurity:     Worry: Not on file     Inability: Not on file    Transportation needs:     Medical: Not on file     Non-medical: Not on file   Tobacco Use    Smoking status: Never Smoker    Smokeless tobacco: Never Used   Substance and Sexual Activity    Alcohol use: No    Drug use: No    Sexual activity: Not on file   Lifestyle    Physical activity:     Days per week: Not on file     Minutes per session: Not on file    Stress: Not on file   Relationships    Social connections:     Talks on phone: Not on file     Gets together: Not on file     Attends Judaism service: Not on file     Active member of club or organization: Not on file     Attends meetings of clubs or organizations: Not on file     Relationship status: Not on file    Intimate partner violence:     Fear of current or ex partner: Not on file     Emotionally abused: Not on file     Physically abused: Not on file     Forced sexual activity: Not on file   Other Topics Concern    Not on file   Social History Narrative    Not on file      Family History   Problem Relation Age of Onset    Coronary artery disease Mother     Diabetes Maternal Grandmother      Past Surgical History:   Procedure Laterality Date    CARDIAC PACEMAKER PLACEMENT  05/01/2005    CATARACT EXTRACTION Left 10/01/1995    CATARACT EXTRACTION Right 02/01/2000    CORONARY ANGIOPLASTY  01/01/2010 2010    CORONARY ANGIOPLASTY WITH STENT PLACEMENT  11/01/2002    INGUINAL HERNIA REPAIR Right 01/01/1998    INGUINAL HERNIA REPAIR Left 01/01/2009    2009    LUMBAR LAMINECTOMY  01/01/2002    2002    REPLACEMENT TOTAL KNEE Right 06/01/2010    right total knee replacement with complications    RHIZOTOMY      TONSILLECTOMY AND ADENOIDECTOMY  01/01/1937    1937    TOTAL HIP ARTHROPLASTY Left 01/18/2017       Current Outpatient Medications:     aspirin 81 MG tablet, Take 1 tablet by mouth daily , Disp: , Rfl:     atorvastatin (LIPITOR) 10 mg tablet, TAKE 1 TABLET DAILY, Disp: 90 tablet, Rfl: 4    cephalexin (KEFLEX) 500 mg capsule, , Disp: , Rfl: 0    glimepiride (AMARYL) 1 mg tablet, TAKE 1 TABLET DAILY, Disp: 90 tablet, Rfl: 3    glucose blood (ONE TOUCH ULTRA TEST) test strip, Patient tests once daily  , Disp: 50 each, Rfl: 3    Glucose Blood (ONETOUCH ULTRA BLUE VI), by In Vitro route daily, Disp: , Rfl:     metoprolol tartrate (LOPRESSOR) 25 mg tablet, TAKE 1 TABLET DAILY IN THE EVENING, Disp: 90 tablet, Rfl: 0    metoprolol tartrate (LOPRESSOR) 50 mg tablet, TAKE 1 TABLET DAILY IN THE MORNING, Disp: 90 tablet, Rfl: 0    Multiple Vitamins-Minerals (MULTIVITAMIN ADULT PO), Take 1 tablet by mouth every other day , Disp: , Rfl:     ONETOUCH DELICA LANCETS 20E MISC, by Does not apply route, Disp: , Rfl:   Allergies   Allergen Reactions    Penicillins     Plavix [Clopidogrel]        Labs:not applicable  Imaging: No results found  Review of Systems:  Review of Systems   All other systems reviewed and are negative  Physical Exam:  /76 (BP Location: Right arm, Patient Position: Sitting, Cuff Size: Standard)   Pulse 66   Ht 5' 10" (1 778 m)   Wt 72 1 kg (158 lb 14 4 oz)   BMI 22 80 kg/m²   Physical Exam   Constitutional: He is oriented to person, place, and time  He appears well-developed and well-nourished  HENT:   Head: Normocephalic and atraumatic  Eyes: Pupils are equal, round, and reactive to light  Conjunctivae are normal    Neck: Normal range of motion  Neck supple  Cardiovascular: Normal rate and normal heart sounds  Pulmonary/Chest: Effort normal and breath sounds normal    Neurological: He is alert and oriented to person, place, and time  Skin: Skin is warm and dry  Psychiatric: He has a normal mood and affect  Vitals reviewed  Discussion/Summary:I will continue the patient's present medical regimen  The patient appears well compensated  I have asked the patient to call if there is a problem in the interim otherwise I will see the patient in six months time

## 2019-10-08 ENCOUNTER — IMMUNIZATIONS (OUTPATIENT)
Dept: FAMILY MEDICINE CLINIC | Facility: CLINIC | Age: 84
End: 2019-10-08
Payer: MEDICARE

## 2019-10-08 DIAGNOSIS — Z23 NEED FOR IMMUNIZATION AGAINST INFLUENZA: Primary | ICD-10-CM

## 2019-10-08 PROCEDURE — G0008 ADMIN INFLUENZA VIRUS VAC: HCPCS

## 2019-10-08 PROCEDURE — 90662 IIV NO PRSV INCREASED AG IM: CPT

## 2019-10-11 ENCOUNTER — OFFICE VISIT (OUTPATIENT)
Dept: CARDIOLOGY CLINIC | Facility: CLINIC | Age: 84
End: 2019-10-11
Payer: MEDICARE

## 2019-10-11 VITALS
HEART RATE: 66 BPM | HEIGHT: 70 IN | BODY MASS INDEX: 22.75 KG/M2 | WEIGHT: 158.9 LBS | SYSTOLIC BLOOD PRESSURE: 126 MMHG | DIASTOLIC BLOOD PRESSURE: 76 MMHG

## 2019-10-11 DIAGNOSIS — E78.00 PURE HYPERCHOLESTEROLEMIA: ICD-10-CM

## 2019-10-11 DIAGNOSIS — I34.0 NON-RHEUMATIC MITRAL REGURGITATION: ICD-10-CM

## 2019-10-11 DIAGNOSIS — Z95.0 CARDIAC PACEMAKER IN SITU: ICD-10-CM

## 2019-10-11 DIAGNOSIS — I25.10 CORONARY ARTERIOSCLEROSIS: ICD-10-CM

## 2019-10-11 DIAGNOSIS — I10 ESSENTIAL (PRIMARY) HYPERTENSION: Primary | ICD-10-CM

## 2019-10-11 PROCEDURE — 99214 OFFICE O/P EST MOD 30 MIN: CPT | Performed by: INTERNAL MEDICINE

## 2019-11-13 DIAGNOSIS — I25.10 CORONARY ARTERIOSCLEROSIS: ICD-10-CM

## 2019-11-13 DIAGNOSIS — I10 ESSENTIAL (PRIMARY) HYPERTENSION: ICD-10-CM

## 2019-11-13 RX ORDER — METOPROLOL TARTRATE 50 MG/1
TABLET, FILM COATED ORAL
Qty: 90 TABLET | Refills: 4 | Status: SHIPPED | OUTPATIENT
Start: 2019-11-13 | End: 2021-01-29

## 2019-12-05 ENCOUNTER — REMOTE DEVICE CLINIC VISIT (OUTPATIENT)
Dept: CARDIOLOGY CLINIC | Facility: CLINIC | Age: 84
End: 2019-12-05
Payer: MEDICARE

## 2019-12-05 DIAGNOSIS — Z95.0 CARDIAC PACEMAKER IN SITU: Primary | ICD-10-CM

## 2019-12-05 PROCEDURE — 93296 REM INTERROG EVL PM/IDS: CPT | Performed by: INTERNAL MEDICINE

## 2019-12-05 PROCEDURE — 93294 REM INTERROG EVL PM/LDLS PM: CPT | Performed by: INTERNAL MEDICINE

## 2019-12-05 NOTE — PROGRESS NOTES
Results for orders placed or performed in visit on 12/05/19   Cardiac EP device report    Narrative    MDT DUAL PPM  CARELINK TRANSMISSION: BATTERY VOLTAGE ADEQUATE (10 YRS)  AP-94%, -81% (>40% Everett@[employer])  ALL AVAILABLE LEAD PARAMETERS WITHIN NORMAL LIMITS  NO SIGNIFICANT HIGH RATE EPISODES  NORMAL DEVICE FUNCTION   GV

## 2020-01-27 ENCOUNTER — OFFICE VISIT (OUTPATIENT)
Dept: FAMILY MEDICINE CLINIC | Facility: CLINIC | Age: 85
End: 2020-01-27
Payer: MEDICARE

## 2020-01-27 VITALS
HEART RATE: 78 BPM | SYSTOLIC BLOOD PRESSURE: 128 MMHG | BODY MASS INDEX: 24.25 KG/M2 | TEMPERATURE: 97.8 F | WEIGHT: 160 LBS | HEIGHT: 68 IN | DIASTOLIC BLOOD PRESSURE: 64 MMHG | RESPIRATION RATE: 16 BRPM

## 2020-01-27 DIAGNOSIS — L30.0 NUMMULAR ECZEMA: Primary | ICD-10-CM

## 2020-01-27 PROCEDURE — 99212 OFFICE O/P EST SF 10 MIN: CPT | Performed by: FAMILY MEDICINE

## 2020-01-27 RX ORDER — TRIAMCINOLONE ACETONIDE 1 MG/G
CREAM TOPICAL 2 TIMES DAILY
Qty: 30 G | Refills: 0 | Status: SHIPPED | OUTPATIENT
Start: 2020-01-27 | End: 2020-04-14

## 2020-01-27 NOTE — PROGRESS NOTES
Assessment/Plan:       Diagnoses and all orders for this visit:    Nummular eczema  -     triamcinolone (KENALOG) 0 1 % cream; Apply topically 2 (two) times a day          Suspected nummular eczema  D/C Neosporin  Trial of Triamcinolone 0 1% cream BID x 2 weeks  Consider punch biopsy if no improvement  Patient ID: Sarika Keita is a 80 y o  male  Asymptomatic rash x 1 month left lateral calf  No pain or itching  He tried using Neosporin w/out improvement  Past history of BCCs followed by dermatology  Current medications reviewed  Current Outpatient Medications:     aspirin 81 MG tablet, Take 1 tablet by mouth daily , Disp: , Rfl:     atorvastatin (LIPITOR) 10 mg tablet, TAKE 1 TABLET DAILY, Disp: 90 tablet, Rfl: 4    cephalexin (KEFLEX) 500 mg capsule, , Disp: , Rfl: 0    glimepiride (AMARYL) 1 mg tablet, TAKE 1 TABLET DAILY, Disp: 90 tablet, Rfl: 3    glucose blood (ONE TOUCH ULTRA TEST) test strip, Patient tests once daily  , Disp: 50 each, Rfl: 3    Glucose Blood (ONETOUCH ULTRA BLUE VI), by In Vitro route daily, Disp: , Rfl:     metoprolol tartrate (LOPRESSOR) 25 mg tablet, TAKE 1 TABLET DAILY IN THE EVENING, Disp: 90 tablet, Rfl: 4    metoprolol tartrate (LOPRESSOR) 50 mg tablet, TAKE 1 TABLET DAILY IN THE MORNING, Disp: 90 tablet, Rfl: 4    Multiple Vitamins-Minerals (MULTIVITAMIN ADULT PO), Take 1 tablet by mouth every other day , Disp: , Rfl:     ONETOUCH DELICA LANCETS 65U MISC, by Does not apply route, Disp: , Rfl:     triamcinolone (KENALOG) 0 1 % cream, Apply topically 2 (two) times a day, Disp: 30 g, Rfl: 0      The following portions of the patient's history were reviewed and updated as appropriate: allergies, current medications, past family history, past medical history, past social history, past surgical history and problem list     Review of Systems   Constitutional: Positive for unexpected weight change (14 lb weight gain from 10/2019)   Negative for appetite change, chills and fever  Skin: Positive for rash  See HPI  Objective:      /64   Pulse 78   Temp 97 8 °F (36 6 °C)   Resp 16   Ht 5' 8 5" (1 74 m)   Wt 78 kg (172 lb)   BMI 25 77 kg/m²          Physical Exam   Constitutional: He appears well-developed and well-nourished  No distress  Musculoskeletal: He exhibits no edema  Skin: Skin is warm and dry  Rash noted

## 2020-02-06 ENCOUNTER — APPOINTMENT (OUTPATIENT)
Dept: LAB | Facility: CLINIC | Age: 85
End: 2020-02-06
Payer: MEDICARE

## 2020-02-06 ENCOUNTER — TRANSCRIBE ORDERS (OUTPATIENT)
Dept: LAB | Facility: CLINIC | Age: 85
End: 2020-02-06

## 2020-02-06 DIAGNOSIS — C61 MALIGNANT NEOPLASM OF PROSTATE (HCC): ICD-10-CM

## 2020-02-06 DIAGNOSIS — C61 MALIGNANT NEOPLASM OF PROSTATE (HCC): Primary | ICD-10-CM

## 2020-02-06 LAB
BUN SERPL-MCNC: 21 MG/DL (ref 5–25)
CREAT SERPL-MCNC: 1 MG/DL (ref 0.6–1.3)
GFR SERPL CREATININE-BSD FRML MDRD: 68 ML/MIN/1.73SQ M
PSA SERPL-MCNC: 3.9 NG/ML (ref 0–4)

## 2020-02-06 PROCEDURE — 36415 COLL VENOUS BLD VENIPUNCTURE: CPT

## 2020-02-06 PROCEDURE — 84153 ASSAY OF PSA TOTAL: CPT

## 2020-02-06 PROCEDURE — 84520 ASSAY OF UREA NITROGEN: CPT

## 2020-02-06 PROCEDURE — 82565 ASSAY OF CREATININE: CPT

## 2020-03-05 ENCOUNTER — REMOTE DEVICE CLINIC VISIT (OUTPATIENT)
Dept: CARDIOLOGY CLINIC | Facility: CLINIC | Age: 85
End: 2020-03-05
Payer: MEDICARE

## 2020-03-05 DIAGNOSIS — Z95.0 CARDIAC PACEMAKER IN SITU: Primary | ICD-10-CM

## 2020-03-05 PROCEDURE — 93294 REM INTERROG EVL PM/LDLS PM: CPT | Performed by: INTERNAL MEDICINE

## 2020-03-05 PROCEDURE — 93296 REM INTERROG EVL PM/IDS: CPT | Performed by: INTERNAL MEDICINE

## 2020-03-05 NOTE — PROGRESS NOTES
Results for orders placed or performed in visit on 03/05/20   Cardiac EP device report    Narrative    MDT DUAL PPM  CARELINK TRANSMISSION: BATTERY VOLTAGE ADEQUATE (10 YRS)  AP 95%   82% (>40% MVP ON 60PPM)  ALL AVAILABLE LEAD PARAMETERS WITHIN NORMAL LIMITS  NO SIGNIFICANT HIGH RATE EPISODES  NORMAL DEVICE FUNCTION    EB

## 2020-03-08 NOTE — PROGRESS NOTES
Assessment/Plan:       Diagnoses and all orders for this visit:    Type 2 diabetes mellitus without complication, without long-term current use of insulin (HCC)  -     Hemoglobin A1C  -     Microalbumin / creatinine urine ratio    Essential hypertension  -     CBC and differential  -     Comprehensive metabolic panel    Pure hypercholesterolemia  -     Lipid panel    SSS (sick sinus syndrome) ([facility])    Arteriosclerosis of coronary artery    Nonrheumatic aortic valve insufficiency    Nonrheumatic mitral valve regurgitation    Prostate cancer ([address] )    Medicare annual wellness visit, subsequent          Continue with current medications  Repeat labs  Follow up visits with Cardiology and Urology  OV 1 year  BMI Counseling: Body mass index is 27 72 kg/m²  The BMI is above normal  Nutrition recommendations include reducing portion sizes, decreasing overall calorie intake, consuming healthier snacks, moderation in carbohydrate intake, reducing intake of saturated fat and trans fat and reducing intake of cholesterol  Exercise recommendations include exercising 3-5 times per week  Patient ID: Terri Murphy is a 80 y o  male  Follow up visit  Medications reviewed Type 2 diabetes mellitus  On Glimepiride 1 mg daily  03/2019  A1c 6 1  Urine microalbuminin 5 7  Not on ACE or ARB  No hypoglycemic events  No neuropathy symptoms  Up to date with eye exam  Hypertension blood pressures have been stable on Metoprolol tartrate 50 mg in AM and 25 mg in PM  02/2020 Creatinine 1 00  03/2019 Electrolytes normal  Hemoglobin 14  3  Hyperlipidemia and CAD on Atorvastatin 10 mg daily lipid profile 03/2019 Cholesterol 138  Triglycerides 49  HDL 46  LDL 82  LFTs normal  TSH 1 78         The following portions of the patient's history were reviewed and updated as appropriate: allergies, current medications, past family history, past medical history, past social history, past surgical history and problem list     Review of Systems Constitutional: Negative for appetite change, chills, fever and unexpected weight change  HENT: Negative for congestion, ear pain, rhinorrhea, sore throat and trouble swallowing  Eyes: Negative for visual disturbance  Respiratory: Negative for cough, shortness of breath and wheezing  Cardiovascular: Negative for chest pain, palpitations and leg swelling  CAD s/p stents  S/p pacemaker  02/2018  Echocardiogram normal left ventricular systolic function  EF 55%  No regional wall motion abnormalities  Grade 1 diastolic dysfunction  mild mitral regurgitation  Mild aortic regurgitation  Trace TR/LA  No pericardial effusion  Aortic root normal size  Gastrointestinal: Negative for abdominal pain, blood in stool, constipation, diarrhea, nausea and vomiting  Colonoscopy 04/2016   Endocrine: Negative for polydipsia and polyuria  Genitourinary: Negative for difficulty urinating  Prostate CA s/p XRT  02/2020 PSA 3 9  08/2019 PSA 3 7  01/2019 PSA 2 4  11/2018 PSA 2 8  11/2017 PSA 1 7  11/2016 PSA 1 4  Musculoskeletal: Negative for arthralgias, gait problem and myalgias  OA S/p left THR 01/2017  Skin: Negative for rash  History of BCCs/SCCs   Allergic/Immunologic: Negative for environmental allergies  Neurological: Negative for dizziness and headaches  Hematological: Negative for adenopathy  Does not bruise/bleed easily  Psychiatric/Behavioral: Negative for dysphoric mood and sleep disturbance  Objective:      /70   Pulse 74   Temp (!) 96 7 °F (35 9 °C)   Resp 16   Ht 5' 3 5" (1 613 m)   Wt 72 1 kg (159 lb)   BMI 27 72 kg/m²     Wt Readings from Last 3 Encounters:   03/09/20 72 1 kg (159 lb)   01/27/20 72 6 kg (160 lb)   10/11/19 72 1 kg (158 lb 14 4 oz)        Physical Exam   Constitutional: He is oriented to person, place, and time  He appears well-developed and well-nourished  No distress     HENT:   Right Ear: Tympanic membrane normal  Left Ear: Tympanic membrane normal    Mouth/Throat: Oropharynx is clear and moist    Eyes: Pupils are equal, round, and reactive to light  Conjunctivae and EOM are normal  No scleral icterus  Neck: No JVD present  Carotid bruit is not present  No tracheal deviation present  No thyroid mass and no thyromegaly present  Cardiovascular: Normal rate, regular rhythm and normal heart sounds  Exam reveals no gallop  Pulses are no weak pulses  No murmur heard  Pulses:       Carotid pulses are 2+ on the right side, and 2+ on the left side  Dorsalis pedis pulses are 2+ on the right side, and 2+ on the left side  Posterior tibial pulses are 2+ on the right side, and 2+ on the left side  Pulmonary/Chest: Effort normal and breath sounds normal  No respiratory distress  He has no wheezes  He has no rales  Abdominal: Soft  Bowel sounds are normal  He exhibits no distension, no abdominal bruit and no mass  There is no hepatosplenomegaly  There is no tenderness  There is no rebound and no guarding  Musculoskeletal: Normal range of motion  He exhibits no edema  Feet:   Right Foot:   Skin Integrity: Negative for ulcer, skin breakdown, erythema, warmth, callus or dry skin  Left Foot:   Skin Integrity: Negative for ulcer, skin breakdown, erythema, warmth, callus or dry skin  Lymphadenopathy:     He has no cervical adenopathy  Neurological: He is alert and oriented to person, place, and time  No cranial nerve deficit  Skin: No rash noted  No cyanosis  Nails show no clubbing  Psychiatric: He has a normal mood and affect  Nursing note and vitals reviewed  Patient's shoes and socks removed  Right Foot/Ankle   Right Foot Inspection  Skin Exam: skin normal and skin intact no dry skin, no warmth, no callus, no erythema, no maceration, no abnormal color, no pre-ulcer, no ulcer and no callus                          Toe Exam: ROM and strength within normal limits  Sensory       Monofilament testing: intact  Vascular  Capillary refills: < 3 seconds  The right DP pulse is 2+  The right PT pulse is 2+  Left Foot/Ankle  Left Foot Inspection  Skin Exam: skin normal and skin intactno dry skin, no warmth, no erythema, no maceration, normal color, no pre-ulcer, no ulcer and no callus                         Toe Exam: ROM and strength within normal limits                   Sensory       Monofilament: intact  Vascular  Capillary refills: < 3 seconds  The left DP pulse is 2+  The left PT pulse is 2+  Assign Risk Category:  No deformity present; No loss of protective sensation;  No weak pulses       Risk: 0      Hemoglobin A1C (%)   Date Value   03/08/2019 6 1   02/27/2018 5 9   03/01/2017 5 1   12/22/2015 8 0 (H)   02/20/2015 6 6 (H)         Lab Results   Component Value Date    PSA 3 9 02/06/2020    PSA 3 7 08/12/2019    PSA 2 4 01/28/2019     Lab Results   Component Value Date    WBC 5 27 03/08/2019    HGB 14 3 03/08/2019    HCT 44 2 03/08/2019    MCV 96 03/08/2019     03/08/2019     Lab Results   Component Value Date     12/22/2015    SODIUM 139 03/08/2019    K 4 4 03/08/2019     03/08/2019    CO2 29 03/08/2019    ANIONGAP 6 12/22/2015    AGAP 4 03/08/2019    BUN 21 02/06/2020    CREATININE 1 00 02/06/2020    GLUC 137 04/07/2016    GLUF 105 (H) 03/08/2019    CALCIUM 8 9 03/08/2019    AST 19 03/08/2019    ALT 27 03/08/2019    ALKPHOS 56 03/08/2019    PROT 7 2 12/22/2015    TP 7 3 03/08/2019    BILITOT 0 87 12/22/2015    TBILI 0 65 03/08/2019    EGFR 68 02/06/2020     Lab Results   Component Value Date    CHOLESTEROL 138 03/08/2019    CHOLESTEROL 130 02/27/2018    CHOLESTEROL 134 03/01/2017     Lab Results   Component Value Date    HDL 46 03/08/2019    HDL 44 02/27/2018    HDL 53 03/01/2017     Lab Results   Component Value Date    TRIG 49 03/08/2019    TRIG 69 02/27/2018    TRIG 63 03/01/2017     Lab Results   Component Value Date    [place] 92 03/08/2019

## 2020-03-09 ENCOUNTER — OFFICE VISIT (OUTPATIENT)
Dept: FAMILY MEDICINE CLINIC | Facility: CLINIC | Age: 85
End: 2020-03-09
Payer: MEDICARE

## 2020-03-09 DIAGNOSIS — C61 PROSTATE CANCER (HCC): ICD-10-CM

## 2020-03-09 DIAGNOSIS — I25.10 ARTERIOSCLEROSIS OF CORONARY ARTERY: ICD-10-CM

## 2020-03-09 DIAGNOSIS — Z00.00 MEDICARE ANNUAL WELLNESS VISIT, SUBSEQUENT: ICD-10-CM

## 2020-03-09 DIAGNOSIS — E11.9 TYPE 2 DIABETES MELLITUS WITHOUT COMPLICATION, WITHOUT LONG-TERM CURRENT USE OF INSULIN (HCC): ICD-10-CM

## 2020-03-09 DIAGNOSIS — E78.00 PURE HYPERCHOLESTEROLEMIA: ICD-10-CM

## 2020-03-09 DIAGNOSIS — I10 ESSENTIAL HYPERTENSION: ICD-10-CM

## 2020-03-09 DIAGNOSIS — E11.9 TYPE 2 DIABETES MELLITUS WITHOUT COMPLICATION, WITHOUT LONG-TERM CURRENT USE OF INSULIN (HCC): Primary | ICD-10-CM

## 2020-03-09 DIAGNOSIS — I34.0 NONRHEUMATIC MITRAL VALVE REGURGITATION: ICD-10-CM

## 2020-03-09 DIAGNOSIS — I35.1 NONRHEUMATIC AORTIC VALVE INSUFFICIENCY: ICD-10-CM

## 2020-03-09 DIAGNOSIS — I49.5 SSS (SICK SINUS SYNDROME) (HCC): ICD-10-CM

## 2020-03-09 PROCEDURE — 1170F FXNL STATUS ASSESSED: CPT | Performed by: FAMILY MEDICINE

## 2020-03-09 PROCEDURE — 1036F TOBACCO NON-USER: CPT | Performed by: FAMILY MEDICINE

## 2020-03-09 PROCEDURE — 3008F BODY MASS INDEX DOCD: CPT | Performed by: FAMILY MEDICINE

## 2020-03-09 PROCEDURE — 1125F AMNT PAIN NOTED PAIN PRSNT: CPT | Performed by: FAMILY MEDICINE

## 2020-03-09 PROCEDURE — 3078F DIAST BP <80 MM HG: CPT | Performed by: FAMILY MEDICINE

## 2020-03-09 PROCEDURE — 3075F SYST BP GE 130 - 139MM HG: CPT | Performed by: FAMILY MEDICINE

## 2020-03-09 PROCEDURE — 99214 OFFICE O/P EST MOD 30 MIN: CPT | Performed by: FAMILY MEDICINE

## 2020-03-09 PROCEDURE — G0439 PPPS, SUBSEQ VISIT: HCPCS | Performed by: FAMILY MEDICINE

## 2020-03-09 PROCEDURE — 4040F PNEUMOC VAC/ADMIN/RCVD: CPT | Performed by: FAMILY MEDICINE

## 2020-03-09 PROCEDURE — 2022F DILAT RTA XM EVC RTNOPTHY: CPT | Performed by: FAMILY MEDICINE

## 2020-03-09 PROCEDURE — 1160F RVW MEDS BY RX/DR IN RCRD: CPT | Performed by: FAMILY MEDICINE

## 2020-03-09 RX ORDER — LANCETS 33 GAUGE
EACH MISCELLANEOUS DAILY
Qty: 100 EACH | Refills: 3 | Status: SHIPPED | OUTPATIENT
Start: 2020-03-09 | End: 2021-11-01

## 2020-03-09 NOTE — PROGRESS NOTES
Assessment and Plan:     Problem List Items Addressed This Visit        Endocrine    Type 2 diabetes mellitus ([facility] ) - Primary    Relevant Orders    Hemoglobin A1C    Microalbumin / creatinine urine ratio       Cardiovascular and Mediastinum    Aortic valve regurgitation    Arteriosclerosis of coronary artery    Hypertension    Relevant Orders    CBC and differential    Comprehensive metabolic panel    Mitral regurgitation    SSS (sick sinus syndrome) ([facility])       Genitourinary    Prostate cancer ([facility] )       Other    Hyperlipidemia    Relevant Orders    Lipid panel      Other Visit Diagnoses     Medicare annual wellness visit, subsequent            BMI Counseling: Body mass index is 27 72 kg/m²  The BMI is above normal  Nutrition recommendations include decreasing portion sizes, consuming healthier snacks, moderation in carbohydrate intake, reducing intake of saturated and trans fat and reducing intake of cholesterol  Exercise recommendations include exercising 3-5 times per week  No pharmacotherapy was ordered  Preventive health issues were discussed with patient, and age appropriate screening tests were ordered as noted in patient's After Visit Summary  Personalized health advice and appropriate referrals for health education or preventive services given if needed, as noted in patient's After Visit Summary       History of Present Illness:     Patient presents for Medicare Annual Wellness visit    Patient Care Team:  Margarita Pitt MD as PCP - General  MD Kai Burger MD Jesusa Moore, MD (Urology)  Vitaly Munroe MD (Dermatology)  Daisy Townsend DPM (Podiatry)     Problem List:     Patient Active Problem List   Diagnosis    Aortic valve regurgitation    Arteriosclerosis of coronary artery    Carpal tunnel syndrome    Disc degeneration, lumbar    Facet arthritis of lumbar region    Glaucoma    Hyperlipidemia    Hypertension    Lumbar canal stenosis    Mitral regurgitation    [facility] )    Type 2 diabetes mellitus ([facility] )    SSS (sick sinus syndrome) ([facility] )      Past Medical and Surgical History:     Past Medical History:   Diagnosis Date    Basal cell carcinoma     Benign polyp of large intestine     Osteoarthritis of left hip     last assessed [id number]    Piriformis syndrome of left side     last assessed [id number]     Past Surgical History:   Procedure Laterality Date    CARDIAC PACEMAKER PLACEMENT  05/01/2005    CATARACT EXTRACTION Left 10/01/1995    CATARACT EXTRACTION Right 02/01/2000    CORONARY ANGIOPLASTY  01/01/2010 2010    CORONARY ANGIOPLASTY WITH STENT PLACEMENT  11/01/2002    INGUINAL HERNIA REPAIR Right 01/01/1998    INGUINAL HERNIA REPAIR Left 01/01/2009    2009    LUMBAR LAMINECTOMY  01/01/2002    2002    REPLACEMENT TOTAL KNEE Right 06/01/2010    right total knee replacement with complications    RHIZOTOMY      TONSILLECTOMY AND ADENOIDECTOMY  01/01/1937    1937    TOTAL HIP ARTHROPLASTY Left 01/18/2017      Family History:     Family History   Problem Relation Age of Onset    Coronary artery disease Mother     Diabetes Maternal Grandmother       Social History:        Social History     Socioeconomic History    Marital status: /Civil Union     Spouse name: None    Number of children: None    Years of education: None    Highest education level: None   Occupational History    None   Social Needs    Financial resource strain: None    Food insecurity:     Worry: None     Inability: None    Transportation needs:     Medical: None     Non-medical: None   Tobacco Use    Smoking status: Never Smoker    Smokeless tobacco: Never Used   Substance and Sexual Activity    Alcohol use: No    Drug use: No    Sexual activity: None   Lifestyle    Physical activity:     Days per week: None     Minutes per session: None    Stress: None   Relationships    Social connections:     Talks on phone: None     Gets together: None     Attends Sabianism service: None     Active member of club or organization: None     Attends meetings of clubs or organizations: None     Relationship status: None    Intimate partner violence:     Fear of current or ex partner: None     Emotionally abused: None     Physically abused: None     Forced sexual activity: None   Other Topics Concern    None   Social History Narrative    None      Medications and Allergies:     Current Outpatient Medications   Medication Sig Dispense Refill    aspirin 81 MG tablet Take 1 tablet by mouth daily       atorvastatin (LIPITOR) 10 mg tablet TAKE 1 TABLET DAILY 90 tablet 4    cephalexin (KEFLEX) 500 mg capsule   0    glimepiride (AMARYL) 1 mg tablet TAKE 1 TABLET DAILY 90 tablet 3    glucose blood (ONE TOUCH ULTRA TEST) test strip Patient tests once daily  50 each 3    metoprolol tartrate (LOPRESSOR) 25 mg tablet TAKE 1 TABLET DAILY IN THE EVENING 90 tablet 4    metoprolol tartrate (LOPRESSOR) 50 mg tablet TAKE 1 TABLET DAILY IN THE MORNING 90 tablet 4    Multiple Vitamins-Minerals (MULTIVITAMIN ADULT PO) Take 1 tablet by mouth every other day   ONETOUCH DELICA LANCETS 50W MISC by Does not apply route      triamcinolone (KENALOG) 0 1 % cream Apply topically 2 (two) times a day 30 g 0     No current facility-administered medications for this visit  Allergies   Allergen Reactions    Penicillins Rash    Plavix [Clopidogrel] Rash      Immunizations:     Immunization History   Administered Date(s) Administered    Influenza Split High Dose Preservative Free IM 12/04/2012, 12/13/2013, 10/01/2014, 09/24/2015, 11/09/2016, 11/02/2017    Influenza, high dose seasonal 0 5 mL 10/26/2018, 10/08/2019    Pneumococcal Conjugate 13-Valent 02/23/2016    Pneumococcal Polysaccharide PPV23 12/13/2011    Zoster 05/30/2013    Zoster Vaccine Recombinant 06/12/2019, 08/15/2019      Health Maintenance:      There are no preventive care reminders to display for this patient  There are no preventive care reminders to display for this patient  Medicare Health Risk Assessment:     /70   Pulse 74   Temp (!) 96 7 °F (35 9 °C)   Resp 16   Ht 5' 3 5" (1 613 m)   Wt 72 1 kg (159 lb)   BMI 27 72 kg/m²      Samaria Lo is here for his Subsequent Wellness visit  Last Medicare Wellness visit information reviewed, patient interviewed and updates made to the record today  Health Risk Assessment:   Patient rates overall health as very good  Patient feels that their physical health rating is same  Eyesight was rated as same  Hearing was rated as same  Patient feels that their emotional and mental health rating is same  Pain experienced in the last 7 days has been none  Patient states that he has experienced no weight loss or gain in last 6 months  Depression Screening:   PHQ-2 Score: 0      Fall Risk Screening: In the past year, patient has experienced: no history of falling in past year      Home Safety:  Patient does not have trouble with stairs inside or outside of their home  Patient has working smoke alarms and has working carbon monoxide detector  Home safety hazards include: none  Nutrition:   Current diet is Low Carb and Limited junk food  Medications:   Patient is currently taking over-the-counter supplements  OTC medications include: see medication list  Patient is able to manage medications  Activities of Daily Living (ADLs)/Instrumental Activities of Daily Living (IADLs):   Walk and transfer into and out of bed and chair?: Yes  Dress and groom yourself?: Yes    Bathe or shower yourself?: Yes    Feed yourself?  Yes  Do your laundry/housekeeping?: Yes  Manage your money, pay your bills and track your expenses?: Yes  Make your own meals?: Yes    Do your own shopping?: Yes    Previous Hospitalizations:   Any hospitalizations or ED visits within the last 12 months?: No      Advance Care Planning:   Living will: Yes    Advanced directive: Yes      Cognitive Screening:   Provider or family/friend/caregiver concerned regarding cognition?: No    PREVENTIVE SCREENINGS      Cardiovascular Screening:    General: Screening Not Indicated and History Lipid Disorder      Diabetes Screening:     General: Screening Not Indicated and History Diabetes      Colorectal Cancer Screening:     General: Screening Not Indicated      Prostate Cancer Screening:    General: History Prostate Cancer and Screening Not Indicated      Osteoporosis Screening:    General: History Osteoporosis      Abdominal Aortic Aneurysm (AAA) Screening:        General: Screening Not Indicated      Lung Cancer Screening:     General: Screening Not Indicated      Hepatitis C Screening:    General: Screening Not Indicated    Other Counseling Topics:   Calcium and vitamin D intake and regular weightbearing exercise         Lincoln Keith MD

## 2020-03-10 VITALS
SYSTOLIC BLOOD PRESSURE: 132 MMHG | WEIGHT: 159 LBS | BODY MASS INDEX: 23.55 KG/M2 | DIASTOLIC BLOOD PRESSURE: 70 MMHG | HEIGHT: 69 IN | HEART RATE: 74 BPM | TEMPERATURE: 96.7 F | RESPIRATION RATE: 16 BRPM

## 2020-04-14 ENCOUNTER — TELEMEDICINE (OUTPATIENT)
Dept: CARDIOLOGY CLINIC | Facility: CLINIC | Age: 85
End: 2020-04-14
Payer: MEDICARE

## 2020-04-14 VITALS
BODY MASS INDEX: 23.4 KG/M2 | TEMPERATURE: 97.8 F | HEART RATE: 79 BPM | SYSTOLIC BLOOD PRESSURE: 124 MMHG | WEIGHT: 158 LBS | HEIGHT: 69 IN | DIASTOLIC BLOOD PRESSURE: 73 MMHG

## 2020-04-14 DIAGNOSIS — I10 ESSENTIAL (PRIMARY) HYPERTENSION: Primary | ICD-10-CM

## 2020-04-14 DIAGNOSIS — I34.0 NON-RHEUMATIC MITRAL REGURGITATION: ICD-10-CM

## 2020-04-14 DIAGNOSIS — E78.00 PURE HYPERCHOLESTEROLEMIA: ICD-10-CM

## 2020-04-14 DIAGNOSIS — Z95.0 CARDIAC PACEMAKER IN SITU: ICD-10-CM

## 2020-04-14 DIAGNOSIS — I25.10 CORONARY ARTERIOSCLEROSIS: ICD-10-CM

## 2020-04-14 PROCEDURE — 99214 OFFICE O/P EST MOD 30 MIN: CPT | Performed by: INTERNAL MEDICINE

## 2020-04-19 DIAGNOSIS — E11.9 TYPE 2 DIABETES MELLITUS WITHOUT COMPLICATION, WITHOUT LONG-TERM CURRENT USE OF INSULIN (HCC): ICD-10-CM

## 2020-04-19 RX ORDER — GLIMEPIRIDE 1 MG/1
TABLET ORAL
Qty: 90 TABLET | Refills: 3 | Status: SHIPPED | OUTPATIENT
Start: 2020-04-19 | End: 2021-04-05

## 2020-04-23 DIAGNOSIS — L30.9 DERMATITIS: Primary | ICD-10-CM

## 2020-04-23 RX ORDER — TRIAMCINOLONE ACETONIDE 1 MG/G
CREAM TOPICAL 2 TIMES DAILY
Qty: 30 G | Refills: 3 | Status: SHIPPED | OUTPATIENT
Start: 2020-04-23 | End: 2020-11-09

## 2020-04-23 RX ORDER — TRIAMCINOLONE ACETONIDE 1 MG/G
CREAM TOPICAL 2 TIMES DAILY
COMMUNITY
End: 2020-04-23 | Stop reason: SDUPTHER

## 2020-06-15 ENCOUNTER — TELEPHONE (OUTPATIENT)
Dept: CARDIOLOGY CLINIC | Facility: CLINIC | Age: 85
End: 2020-06-15

## 2020-06-16 ENCOUNTER — IN-CLINIC DEVICE VISIT (OUTPATIENT)
Dept: CARDIOLOGY CLINIC | Facility: MEDICAL CENTER | Age: 85
End: 2020-06-16
Payer: MEDICARE

## 2020-06-16 DIAGNOSIS — Z95.0 PRESENCE OF PERMANENT CARDIAC PACEMAKER: Primary | ICD-10-CM

## 2020-06-16 PROCEDURE — 93280 PM DEVICE PROGR EVAL DUAL: CPT | Performed by: INTERNAL MEDICINE

## 2020-08-20 ENCOUNTER — APPOINTMENT (OUTPATIENT)
Dept: LAB | Facility: CLINIC | Age: 85
End: 2020-08-20
Payer: MEDICARE

## 2020-08-20 ENCOUNTER — TRANSCRIBE ORDERS (OUTPATIENT)
Dept: LAB | Facility: CLINIC | Age: 85
End: 2020-08-20

## 2020-08-20 DIAGNOSIS — C61 MALIGNANT NEOPLASM OF PROSTATE (HCC): Primary | ICD-10-CM

## 2020-08-20 LAB
ALBUMIN SERPL BCP-MCNC: 3.8 G/DL (ref 3.5–5)
ALP SERPL-CCNC: 48 U/L (ref 46–116)
ALT SERPL W P-5'-P-CCNC: 29 U/L (ref 12–78)
ANION GAP SERPL CALCULATED.3IONS-SCNC: 6 MMOL/L (ref 4–13)
AST SERPL W P-5'-P-CCNC: 22 U/L (ref 5–45)
BASOPHILS # BLD AUTO: 0.03 THOUSANDS/ΜL (ref 0–0.1)
BASOPHILS NFR BLD AUTO: 1 % (ref 0–1)
BILIRUB SERPL-MCNC: 0.7 MG/DL (ref 0.2–1)
BUN SERPL-MCNC: 17 MG/DL (ref 5–25)
CALCIUM SERPL-MCNC: 9.1 MG/DL (ref 8.3–10.1)
CHLORIDE SERPL-SCNC: 108 MMOL/L (ref 100–108)
CHOLEST SERPL-MCNC: 144 MG/DL (ref 50–200)
CO2 SERPL-SCNC: 26 MMOL/L (ref 21–32)
CREAT SERPL-MCNC: 0.84 MG/DL (ref 0.6–1.3)
EOSINOPHIL # BLD AUTO: 0.33 THOUSAND/ΜL (ref 0–0.61)
EOSINOPHIL NFR BLD AUTO: 7 % (ref 0–6)
ERYTHROCYTE [DISTWIDTH] IN BLOOD BY AUTOMATED COUNT: 13 % (ref 11.6–15.1)
EST. AVERAGE GLUCOSE BLD GHB EST-MCNC: 120 MG/DL
GFR SERPL CREATININE-BSD FRML MDRD: 80 ML/MIN/1.73SQ M
GLUCOSE P FAST SERPL-MCNC: 97 MG/DL (ref 65–99)
HBA1C MFR BLD: 5.8 %
HCT VFR BLD AUTO: 42.8 % (ref 36.5–49.3)
HDLC SERPL-MCNC: 41 MG/DL
HGB BLD-MCNC: 14.1 G/DL (ref 12–17)
IMM GRANULOCYTES # BLD AUTO: 0.01 THOUSAND/UL (ref 0–0.2)
IMM GRANULOCYTES NFR BLD AUTO: 0 % (ref 0–2)
LDLC SERPL CALC-MCNC: 87 MG/DL (ref 0–100)
LYMPHOCYTES # BLD AUTO: 0.71 THOUSANDS/ΜL (ref 0.6–4.47)
LYMPHOCYTES NFR BLD AUTO: 14 % (ref 14–44)
MCH RBC QN AUTO: 31.4 PG (ref 26.8–34.3)
MCHC RBC AUTO-ENTMCNC: 32.9 G/DL (ref 31.4–37.4)
MCV RBC AUTO: 95 FL (ref 82–98)
MONOCYTES # BLD AUTO: 0.48 THOUSAND/ΜL (ref 0.17–1.22)
MONOCYTES NFR BLD AUTO: 10 % (ref 4–12)
NEUTROPHILS # BLD AUTO: 3.39 THOUSANDS/ΜL (ref 1.85–7.62)
NEUTS SEG NFR BLD AUTO: 68 % (ref 43–75)
NONHDLC SERPL-MCNC: 103 MG/DL
NRBC BLD AUTO-RTO: 0 /100 WBCS
PLATELET # BLD AUTO: 184 THOUSANDS/UL (ref 149–390)
PMV BLD AUTO: 11.7 FL (ref 8.9–12.7)
POTASSIUM SERPL-SCNC: 4.2 MMOL/L (ref 3.5–5.3)
PROT SERPL-MCNC: 7.4 G/DL (ref 6.4–8.2)
PSA SERPL-MCNC: 5.2 NG/ML (ref 0–4)
RBC # BLD AUTO: 4.49 MILLION/UL (ref 3.88–5.62)
SODIUM SERPL-SCNC: 140 MMOL/L (ref 136–145)
TRIGL SERPL-MCNC: 78 MG/DL
WBC # BLD AUTO: 4.95 THOUSAND/UL (ref 4.31–10.16)

## 2020-08-20 PROCEDURE — G0103 PSA SCREENING: HCPCS

## 2020-08-20 PROCEDURE — 82570 ASSAY OF URINE CREATININE: CPT | Performed by: FAMILY MEDICINE

## 2020-08-20 PROCEDURE — 36415 COLL VENOUS BLD VENIPUNCTURE: CPT

## 2020-08-20 PROCEDURE — 80061 LIPID PANEL: CPT | Performed by: FAMILY MEDICINE

## 2020-08-20 PROCEDURE — 85025 COMPLETE CBC W/AUTO DIFF WBC: CPT | Performed by: FAMILY MEDICINE

## 2020-08-20 PROCEDURE — 82043 UR ALBUMIN QUANTITATIVE: CPT | Performed by: FAMILY MEDICINE

## 2020-08-20 PROCEDURE — 80053 COMPREHEN METABOLIC PANEL: CPT | Performed by: FAMILY MEDICINE

## 2020-08-20 PROCEDURE — 83036 HEMOGLOBIN GLYCOSYLATED A1C: CPT | Performed by: FAMILY MEDICINE

## 2020-08-21 LAB
CREAT UR-MCNC: 133 MG/DL
MICROALBUMIN UR-MCNC: 7.5 MG/L (ref 0–20)
MICROALBUMIN/CREAT 24H UR: 6 MG/G CREATININE (ref 0–30)

## 2020-09-08 ENCOUNTER — TRANSCRIBE ORDERS (OUTPATIENT)
Dept: ADMINISTRATIVE | Facility: HOSPITAL | Age: 85
End: 2020-09-08

## 2020-09-08 ENCOUNTER — TELEPHONE (OUTPATIENT)
Dept: CARDIOLOGY CLINIC | Facility: CLINIC | Age: 85
End: 2020-09-08

## 2020-09-08 DIAGNOSIS — C61 MALIGNANT NEOPLASM OF PROSTATE (HCC): Primary | ICD-10-CM

## 2020-09-08 NOTE — TELEPHONE ENCOUNTER
Pt has been prescribed Bicalutamide 50 mg daily, by Dr [facility]AV[facility]RI[facility] for prostate  He called to ask if you think it is safe to take  He said he read that it may cause bleeding if taken with anticoagulants  He is currently taking aspirin 81 mg daily  Please advise

## 2020-09-11 LAB
LEFT EYE DIABETIC RETINOPATHY: NORMAL
RIGHT EYE DIABETIC RETINOPATHY: NORMAL

## 2020-09-16 ENCOUNTER — HOSPITAL ENCOUNTER (OUTPATIENT)
Dept: NON INVASIVE DIAGNOSTICS | Facility: CLINIC | Age: 85
Discharge: HOME | End: 2020-09-16
Payer: MEDICARE

## 2020-09-16 DIAGNOSIS — C61 MALIGNANT NEOPLASM OF PROSTATE (HCC): ICD-10-CM

## 2020-09-16 PROCEDURE — A9503 TC99M MEDRONATE: HCPCS

## 2020-09-16 PROCEDURE — 78306 BONE IMAGING WHOLE BODY: CPT

## 2020-09-16 PROCEDURE — G1004 CDSM NDSC: HCPCS

## 2020-09-18 ENCOUNTER — REMOTE DEVICE CLINIC VISIT (OUTPATIENT)
Dept: CARDIOLOGY CLINIC | Facility: CLINIC | Age: 85
End: 2020-09-18
Payer: MEDICARE

## 2020-09-18 DIAGNOSIS — Z95.0 PRESENCE OF PERMANENT CARDIAC PACEMAKER: Primary | ICD-10-CM

## 2020-09-18 PROCEDURE — 93294 REM INTERROG EVL PM/LDLS PM: CPT | Performed by: INTERNAL MEDICINE

## 2020-09-18 PROCEDURE — 93296 REM INTERROG EVL PM/IDS: CPT | Performed by: INTERNAL MEDICINE

## 2020-09-18 NOTE — PROGRESS NOTES
Results for orders placed or performed in visit on 09/18/20   Cardiac EP device report    Narrative    MDT DUAL PPM - NOT MRI CONDITIONAL  CARELINK TRANSMISSION: BATTERY VOLTAGE ADEQUATE  (9YRS)  AP 96%  98%  ALL AVAILABLE LEAD PARAMETERS WITHIN NORMAL LIMITS  NO SIGNIFICANT HIGH RATE EPISODES  NORMAL DEVICE FUNCTION  ---CELIS

## 2020-09-24 ENCOUNTER — APPOINTMENT (OUTPATIENT)
Dept: LAB | Facility: CLINIC | Age: 85
End: 2020-09-24
Payer: MEDICARE

## 2020-09-24 ENCOUNTER — TRANSCRIBE ORDERS (OUTPATIENT)
Dept: LAB | Facility: CLINIC | Age: 85
End: 2020-09-24

## 2020-09-24 DIAGNOSIS — C61 MALIGNANT NEOPLASM OF PROSTATE (HCC): ICD-10-CM

## 2020-09-24 DIAGNOSIS — C61 MALIGNANT NEOPLASM OF PROSTATE (HCC): Primary | ICD-10-CM

## 2020-09-24 LAB
BASOPHILS # BLD AUTO: 0.04 THOUSANDS/ΜL (ref 0–0.1)
BASOPHILS NFR BLD AUTO: 1 % (ref 0–1)
BUN SERPL-MCNC: 22 MG/DL (ref 5–25)
CREAT SERPL-MCNC: 0.95 MG/DL (ref 0.6–1.3)
EOSINOPHIL # BLD AUTO: 0.4 THOUSAND/ΜL (ref 0–0.61)
EOSINOPHIL NFR BLD AUTO: 7 % (ref 0–6)
ERYTHROCYTE [DISTWIDTH] IN BLOOD BY AUTOMATED COUNT: 13 % (ref 11.6–15.1)
GFR SERPL CREATININE-BSD FRML MDRD: 73 ML/MIN/1.73SQ M
HCT VFR BLD AUTO: 39.9 % (ref 36.5–49.3)
HGB BLD-MCNC: 13.1 G/DL (ref 12–17)
IMM GRANULOCYTES # BLD AUTO: 0.01 THOUSAND/UL (ref 0–0.2)
IMM GRANULOCYTES NFR BLD AUTO: 0 % (ref 0–2)
LYMPHOCYTES # BLD AUTO: 0.87 THOUSANDS/ΜL (ref 0.6–4.47)
LYMPHOCYTES NFR BLD AUTO: 15 % (ref 14–44)
MCH RBC QN AUTO: 31.7 PG (ref 26.8–34.3)
MCHC RBC AUTO-ENTMCNC: 32.8 G/DL (ref 31.4–37.4)
MCV RBC AUTO: 97 FL (ref 82–98)
MONOCYTES # BLD AUTO: 0.6 THOUSAND/ΜL (ref 0.17–1.22)
MONOCYTES NFR BLD AUTO: 11 % (ref 4–12)
NEUTROPHILS # BLD AUTO: 3.8 THOUSANDS/ΜL (ref 1.85–7.62)
NEUTS SEG NFR BLD AUTO: 66 % (ref 43–75)
NRBC BLD AUTO-RTO: 0 /100 WBCS
PLATELET # BLD AUTO: 191 THOUSANDS/UL (ref 149–390)
PMV BLD AUTO: 11.2 FL (ref 8.9–12.7)
PSA SERPL-MCNC: 2.8 NG/ML (ref 0–4)
RBC # BLD AUTO: 4.13 MILLION/UL (ref 3.88–5.62)
WBC # BLD AUTO: 5.72 THOUSAND/UL (ref 4.31–10.16)

## 2020-09-24 PROCEDURE — 84153 ASSAY OF PSA TOTAL: CPT

## 2020-09-24 PROCEDURE — 85025 COMPLETE CBC W/AUTO DIFF WBC: CPT

## 2020-09-24 PROCEDURE — 36415 COLL VENOUS BLD VENIPUNCTURE: CPT

## 2020-09-24 PROCEDURE — 84520 ASSAY OF UREA NITROGEN: CPT

## 2020-09-24 PROCEDURE — 82565 ASSAY OF CREATININE: CPT

## 2020-10-12 DIAGNOSIS — E78.00 PURE HYPERCHOLESTEROLEMIA: ICD-10-CM

## 2020-10-12 RX ORDER — ATORVASTATIN CALCIUM 10 MG/1
TABLET, FILM COATED ORAL
Qty: 90 TABLET | Refills: 3 | Status: SHIPPED | OUTPATIENT
Start: 2020-10-12 | End: 2021-09-14

## 2020-10-14 ENCOUNTER — IMMUNIZATIONS (OUTPATIENT)
Dept: FAMILY MEDICINE CLINIC | Facility: CLINIC | Age: 85
End: 2020-10-14
Payer: MEDICARE

## 2020-10-14 DIAGNOSIS — Z23 NEED FOR IMMUNIZATION AGAINST INFLUENZA: Primary | ICD-10-CM

## 2020-10-14 PROCEDURE — G0008 ADMIN INFLUENZA VIRUS VAC: HCPCS

## 2020-10-14 PROCEDURE — 90662 IIV NO PRSV INCREASED AG IM: CPT

## 2020-11-12 ENCOUNTER — OFFICE VISIT (OUTPATIENT)
Dept: CARDIOLOGY CLINIC | Facility: CLINIC | Age: 85
End: 2020-11-12
Payer: MEDICARE

## 2020-11-12 VITALS
BODY MASS INDEX: 22.66 KG/M2 | DIASTOLIC BLOOD PRESSURE: 84 MMHG | SYSTOLIC BLOOD PRESSURE: 148 MMHG | HEIGHT: 70 IN | HEART RATE: 72 BPM | TEMPERATURE: 97.9 F | WEIGHT: 158.3 LBS

## 2020-11-12 DIAGNOSIS — I34.0 NON-RHEUMATIC MITRAL REGURGITATION: ICD-10-CM

## 2020-11-12 DIAGNOSIS — Z95.0 PRESENCE OF PERMANENT CARDIAC PACEMAKER: ICD-10-CM

## 2020-11-12 DIAGNOSIS — I10 ESSENTIAL (PRIMARY) HYPERTENSION: Primary | ICD-10-CM

## 2020-11-12 DIAGNOSIS — E78.00 PURE HYPERCHOLESTEROLEMIA: ICD-10-CM

## 2020-11-12 PROCEDURE — 99214 OFFICE O/P EST MOD 30 MIN: CPT | Performed by: INTERNAL MEDICINE

## 2020-11-12 PROCEDURE — 93000 ELECTROCARDIOGRAM COMPLETE: CPT | Performed by: INTERNAL MEDICINE

## 2020-12-18 ENCOUNTER — REMOTE DEVICE CLINIC VISIT (OUTPATIENT)
Dept: CARDIOLOGY CLINIC | Facility: CLINIC | Age: 85
End: 2020-12-18
Payer: MEDICARE

## 2020-12-18 DIAGNOSIS — Z95.0 CARDIAC PACEMAKER IN SITU: Primary | ICD-10-CM

## 2020-12-18 PROCEDURE — 93296 REM INTERROG EVL PM/IDS: CPT | Performed by: INTERNAL MEDICINE

## 2020-12-18 PROCEDURE — 93294 REM INTERROG EVL PM/LDLS PM: CPT | Performed by: INTERNAL MEDICINE

## 2020-12-29 ENCOUNTER — LAB (OUTPATIENT)
Dept: LAB | Facility: CLINIC | Age: 85
End: 2020-12-29
Payer: MEDICARE

## 2020-12-29 ENCOUNTER — TRANSCRIBE ORDERS (OUTPATIENT)
Dept: LAB | Facility: CLINIC | Age: 85
End: 2020-12-29

## 2020-12-29 DIAGNOSIS — C61 MALIGNANT NEOPLASM OF PROSTATE (HCC): Primary | ICD-10-CM

## 2020-12-29 DIAGNOSIS — R33.8 ENLARGED PROSTATE WITH URINARY RETENTION: ICD-10-CM

## 2020-12-29 DIAGNOSIS — N40.1 ENLARGED PROSTATE WITH URINARY RETENTION: ICD-10-CM

## 2020-12-29 LAB — PSA SERPL-MCNC: 0.2 NG/ML (ref 0–4)

## 2020-12-29 PROCEDURE — 84153 ASSAY OF PSA TOTAL: CPT

## 2020-12-29 PROCEDURE — 36415 COLL VENOUS BLD VENIPUNCTURE: CPT

## 2021-01-20 DIAGNOSIS — Z23 ENCOUNTER FOR IMMUNIZATION: ICD-10-CM

## 2021-01-29 DIAGNOSIS — I10 ESSENTIAL (PRIMARY) HYPERTENSION: ICD-10-CM

## 2021-01-29 DIAGNOSIS — I25.10 CORONARY ARTERIOSCLEROSIS: ICD-10-CM

## 2021-01-29 RX ORDER — METOPROLOL TARTRATE 50 MG/1
TABLET, FILM COATED ORAL
Qty: 90 TABLET | Refills: 3 | Status: SHIPPED | OUTPATIENT
Start: 2021-01-29 | End: 2022-03-01

## 2021-03-18 ENCOUNTER — OFFICE VISIT (OUTPATIENT)
Dept: FAMILY MEDICINE CLINIC | Facility: CLINIC | Age: 86
End: 2021-03-18
Payer: MEDICARE

## 2021-03-18 VITALS
RESPIRATION RATE: 17 BRPM | TEMPERATURE: 97.7 F | HEART RATE: 68 BPM | SYSTOLIC BLOOD PRESSURE: 142 MMHG | BODY MASS INDEX: 24.4 KG/M2 | DIASTOLIC BLOOD PRESSURE: 64 MMHG | WEIGHT: 161 LBS | OXYGEN SATURATION: 99 % | HEIGHT: 68 IN

## 2021-03-18 DIAGNOSIS — I10 ESSENTIAL HYPERTENSION: ICD-10-CM

## 2021-03-18 DIAGNOSIS — E11.9 TYPE 2 DIABETES MELLITUS WITHOUT COMPLICATION, WITHOUT LONG-TERM CURRENT USE OF INSULIN (HCC): Primary | ICD-10-CM

## 2021-03-18 DIAGNOSIS — Z00.00 MEDICARE ANNUAL WELLNESS VISIT, SUBSEQUENT: ICD-10-CM

## 2021-03-18 DIAGNOSIS — I25.10 ARTERIOSCLEROSIS OF CORONARY ARTERY: ICD-10-CM

## 2021-03-18 DIAGNOSIS — I49.5 SSS (SICK SINUS SYNDROME) (HCC): ICD-10-CM

## 2021-03-18 DIAGNOSIS — C61 MALIGNANT NEOPLASM OF PROSTATE (HCC): ICD-10-CM

## 2021-03-18 DIAGNOSIS — E78.00 PURE HYPERCHOLESTEROLEMIA: ICD-10-CM

## 2021-03-18 LAB — SL AMB POCT HEMOGLOBIN AIC: 5.8 (ref ?–6.5)

## 2021-03-18 PROCEDURE — 99214 OFFICE O/P EST MOD 30 MIN: CPT | Performed by: FAMILY MEDICINE

## 2021-03-18 PROCEDURE — G0439 PPPS, SUBSEQ VISIT: HCPCS | Performed by: FAMILY MEDICINE

## 2021-03-18 PROCEDURE — 1123F ACP DISCUSS/DSCN MKR DOCD: CPT | Performed by: FAMILY MEDICINE

## 2021-03-18 PROCEDURE — 83036 HEMOGLOBIN GLYCOSYLATED A1C: CPT | Performed by: FAMILY MEDICINE

## 2021-03-18 NOTE — PROGRESS NOTES
Assessment and Plan:     Problem List Items Addressed This Visit        Endocrine    Type 2 diabetes mellitus ([facility] ) - Primary    Relevant Orders    POCT hemoglobin A1c (Completed)       Cardiovascular and Mediastinum    Arteriosclerosis of coronary artery    Hypertension    Relevant Orders    CBC and differential    Comprehensive metabolic panel    SSS (sick sinus syndrome) ([facility])       Other    Hyperlipidemia    Relevant Orders    Lipid panel      Other Visit Diagnoses     Malignant neoplasm of prostate ([facility] )        Medicare annual wellness visit, subsequent               Preventive health issues were discussed with patient, and age appropriate screening tests were ordered as noted in patient's After Visit Summary  Personalized health advice and appropriate referrals for health education or preventive services given if needed, as noted in patient's After Visit Summary       History of Present Illness:     Patient presents for Medicare Annual Wellness visit    Patient Care Team:  Hansa Campa MD as PCP - General  MD Kristel Tariq MD Toi Reus, MD (Urology)  Vassie Severin, MD (Dermatology)  Sayra Hernandez DPM (Podiatry)     Problem List:     Patient Active Problem List   Diagnosis    Aortic valve regurgitation    Arteriosclerosis of coronary artery    Carpal tunnel syndrome    Disc degeneration, lumbar    Facet arthritis of lumbar region    Glaucoma    Hyperlipidemia    Hypertension    Lumbar canal stenosis    Mitral regurgitation    Prostate cancer ([facility] )    Type 2 diabetes mellitus ([facility] )    SSS (sick sinus syndrome) ([facility] )      Past Medical and Surgical History:     Past Medical History:   Diagnosis Date    Basal cell carcinoma     Benign polyp of large intestine     Osteoarthritis of left hip     last assessed [id number]    Piriformis syndrome of left side     last assessed [id number]     Past Surgical History:   Procedure Laterality Date    CARDIAC PACEMAKER PLACEMENT  05/01/2005    CATARACT EXTRACTION Left 10/01/1995    CATARACT EXTRACTION Right 02/01/2000    CORONARY ANGIOPLASTY  01/01/2010 2010    CORONARY ANGIOPLASTY WITH STENT PLACEMENT  11/01/2002    INGUINAL HERNIA REPAIR Right 01/01/1998    INGUINAL HERNIA REPAIR Left 01/01/2009    2009    LUMBAR LAMINECTOMY  01/01/2002    2002    REPLACEMENT TOTAL KNEE Right 06/01/2010    right total knee replacement with complications    RHIZOTOMY      TONSILLECTOMY AND ADENOIDECTOMY  01/01/1937    1937    TOTAL HIP ARTHROPLASTY Left 01/18/2017      Family History:     Family History   Problem Relation Age of Onset    Coronary artery disease Mother     Diabetes Maternal Grandmother       Social History:        Social History     Socioeconomic History    Marital status: /Civil Union     Spouse name: None    Number of children: None    Years of education: None    Highest education level: None   Occupational History    None   Social Needs    Financial resource strain: None    Food insecurity     Worry: None     Inability: None    Transportation needs     Medical: None     Non-medical: None   Tobacco Use    Smoking status: Never Smoker    Smokeless tobacco: Never Used   Substance and Sexual Activity    Alcohol use: No    Drug use: No    Sexual activity: Not Currently     Partners: Female     Birth control/protection: None   Lifestyle    Physical activity     Days per week: None     Minutes per session: None    Stress: None   Relationships    Social connections     Talks on phone: None     Gets together: None     Attends Jehovah's witness service: None     Active member of club or organization: None     Attends meetings of clubs or organizations: None     Relationship status: None    Intimate partner violence     Fear of current or ex partner: None     Emotionally abused: None     Physically abused: None     Forced sexual activity: None   Other Topics Concern    None   Social History Narrative    None      Medications and Allergies:     Current Outpatient Medications   Medication Sig Dispense Refill    aspirin 81 MG tablet Take 1 tablet by mouth daily       atorvastatin (LIPITOR) 10 mg tablet TAKE 1 TABLET DAILY 90 tablet 3    cephalexin (KEFLEX) 500 mg capsule For dental visits  0    glimepiride (AMARYL) 1 mg tablet TAKE 1 TABLET DAILY 90 tablet 3    glucose blood (ONE TOUCH ULTRA TEST) test strip Patient tests once daily  100 each 3    metoprolol tartrate (LOPRESSOR) 25 mg tablet Takes 50 mg in AM, 25 mg in PM) 90 tablet 3    metoprolol tartrate (LOPRESSOR) 50 mg tablet TAKE 1 TABLET DAILY IN THE MORNING 90 tablet 3    Multiple Vitamins-Minerals (MULTIVITAMIN ADULT PO) Take 1 tablet by mouth every other day   OneTouch Delica Lancets 59B MISC by Does not apply route daily Patient Tests Daily 100 each 3     No current facility-administered medications for this visit  Allergies   Allergen Reactions    Penicillins Rash    Plavix [Clopidogrel] Rash      Immunizations:     Immunization History   Administered Date(s) Administered    Influenza Split High Dose Preservative Free IM 12/04/2012, 12/13/2013, 10/01/2014, 09/24/2015, 11/09/2016, 11/02/2017    Influenza, high dose seasonal 0 7 mL 10/26/2018, 10/08/2019, 10/14/2020    Pneumococcal Conjugate 13-Valent 02/23/2016    Pneumococcal Polysaccharide PPV23 12/13/2011    Zoster 05/30/2013    Zoster Vaccine Recombinant 06/12/2019, 08/15/2019      Health Maintenance:         Topic Date Due    Colonoscopy Surveillance  04/26/2021     There are no preventive care reminders to display for this patient  Medicare Health Risk Assessment:     /64 (BP Location: Left arm, Patient Position: Sitting, Cuff Size: Large)   Pulse 68   Temp 97 7 °F (36 5 °C)   Resp 17   Ht 5' 7 5" (1 715 m)   Wt 73 kg (161 lb)   SpO2 99%   BMI 24 84 kg/m²      Coej Calderón is here for his Subsequent Wellness visit   Last Medicare Wellness visit information reviewed, patient interviewed and updates made to the record today  Health Risk Assessment:   Patient rates overall health as very good  Patient feels that their physical health rating is same  Patient is satisfied with their life  Eyesight was rated as slightly worse  Hearing was rated as same  Patient feels that their emotional and mental health rating is same  Patients states they are never, rarely angry  Patient states they are never, rarely unusually tired/fatigued  Pain experienced in the last 7 days has been none  Patient states that he has experienced no weight loss or gain in last 6 months  Depression Screening:   PHQ-2 Score: 0      Fall Risk Screening: In the past year, patient has experienced: no history of falling in past year      Home Safety:  Patient does not have trouble with stairs inside or outside of their home  Patient has working smoke alarms and has working carbon monoxide detector  Home safety hazards include: none  Nutrition:   Current diet is Limited junk food, Diabetic, Regular and Low Cholesterol  Medications:   Patient is currently taking over-the-counter supplements  OTC medications include: see medication list  Patient is able to manage medications  Activities of Daily Living (ADLs)/Instrumental Activities of Daily Living (IADLs):   Walk and transfer into and out of bed and chair?: Yes  Dress and groom yourself?: Yes    Bathe or shower yourself?: Yes    Feed yourself? Yes  Do your laundry/housekeeping?: Yes  Manage your money, pay your bills and track your expenses?: Yes  Make your own meals?: Yes    Do your own shopping?: Yes    Previous Hospitalizations:   Any hospitalizations or ED visits within the last 12 months?: No      Advance Care Planning:   Living will: Yes    Durable POA for healthcare:  Yes    Advanced directive: Yes      Cognitive Screening:   Provider or family/friend/caregiver concerned regarding cognition?: No    PREVENTIVE SCREENINGS      Cardiovascular Screening:    General: Screening Not Indicated and History Lipid Disorder      Diabetes Screening:     General: Screening Not Indicated and History Diabetes      Colorectal Cancer Screening:     General: Screening Not Indicated      Prostate Cancer Screening:    General: History Prostate Cancer and Screening Not Indicated      Osteoporosis Screening:    General: Screening Not Indicated      Abdominal Aortic Aneurysm (AAA) Screening:        General: Screening Not Indicated      Lung Cancer Screening:     General: Screening Not Indicated      Hepatitis C Screening:    General: Screening Not Indicated    Screening, Brief Intervention, and Referral to Treatment (SBIRT)    Screening  Typical number of drinks in a day: 0  Typical number of drinks in a week: 0  Interpretation: Low risk drinking behavior  Single Item Drug Screening:  How often have you used an illegal drug (including marijuana) or a prescription medication for non-medical reasons in the past year? never    Single Item Drug Screen Score: 0  Interpretation: Negative screen for possible drug use disorder    Other Counseling Topics:   Skin self-exam, sunscreen and calcium and vitamin D intake and regular weightbearing exercise         Torin Qureshi MD

## 2021-03-18 NOTE — PROGRESS NOTES
Assessment/Plan:         Diagnoses and all orders for this visit:    Type 2 diabetes mellitus without complication, without long-term current use of insulin (HCC)  -     POCT hemoglobin A1c    Essential hypertension  -     CBC and differential  -     Comprehensive metabolic panel    Pure hypercholesterolemia  -     Lipid panel    Arteriosclerosis of coronary artery    SSS (sick sinus syndrome) ([facility] [address] )    Malignant neoplasm of prostate ([facility] )    Medicare annual wellness visit, subsequent          Continue with current medications  Repeat labs  Office visit 6 months  Follow up with multiple specialists  Patient completed COVID-19 vaccine series      Patient ID: Kasie Littlejohn is a 80 y o  male  Follow up visit  Medications reviewed Type 2 diabetes mellitus  On Glimepiride 1 mg daily  In office A1c today 5 8  Urine microalbuminin 7 5 Not on ACE or ARB  No hypoglycemic events  No neuropathy symptoms  He is followed by Podiatry  Current with eye exam  Hypertension blood pressures have been stable on Metoprolol tartrate 50 mg in AM and 25 mg in PM  09/2020 Creatinine 0 95  08/2020 Electrolytes normal  09/2020 Hemoglobin 13 1  Hyperlipidemia and CAD on Atorvastatin 10 mg daily lipid profile  08/2020 cholesterol 144  HDL 41  Triglycerides 78  LDL 87  LFTs normal   03/2019 TSH 1 78       Lab Results   Component Value Date    WBC 5 72 09/24/2020    HGB 13 1 09/24/2020    HCT 39 9 09/24/2020    MCV 97 09/24/2020     09/24/2020     Lab Results   Component Value Date     12/22/2015    SODIUM 140 08/20/2020    K 4 2 08/20/2020     08/20/2020    CO2 26 08/20/2020    ANIONGAP 6 12/22/2015    AGAP 6 08/20/2020    BUN 22 09/24/2020    CREATININE 0 95 09/24/2020    GLUC 137 04/07/2016    GLUF 97 08/20/2020    CALCIUM 9 1 08/20/2020    AST 22 08/20/2020    ALT 29 08/20/2020    ALKPHOS 48 08/20/2020    PROT 7 2 12/22/2015    TP 7 4 08/20/2020    BILITOT 0 87 12/22/2015    TBILI 0 70 08/20/2020    EGFR 73 09/24/2020     Lab Results   Component Value Date    CHOLESTEROL 144 08/20/2020    CHOLESTEROL 138 03/08/2019    CHOLESTEROL 130 02/27/2018     Lab Results   Component Value Date    HDL 41 08/20/2020    HDL 46 03/08/2019    HDL 44 02/27/2018     Lab Results   Component Value Date    TRIG 78 08/20/2020    TRIG 49 03/08/2019    TRIG 69 02/27/2018     Lab Results   Component Value Date    LDLCALC 87 08/20/2020     Lab Results   Component Value Date    HGBA1C 5 8 03/18/2021     Lab Results   Component Value Date    [id number] 1 780 03/08/2019             The following portions of the patient's history were reviewed and updated as appropriate: allergies, current medications, past family history, past medical history, past social history, past surgical history and problem list     Review of Systems   Constitutional: Negative for appetite change, chills, fever and unexpected weight change  HENT: Negative for congestion, ear pain, rhinorrhea, sore throat and trouble swallowing  Eyes: Negative for visual disturbance  Respiratory: Negative for cough, shortness of breath and wheezing  Cardiovascular: Negative for chest pain, palpitations and leg swelling  CAD s/p stents  S/p pacemaker  02/2018  Echocardiogram normal left ventricular systolic function  EF 55%  No regional wall motion abnormalities  Grade 1 diastolic dysfunction  mild mitral regurgitation  Mild aortic regurgitation  Trace TR/GA  No pericardial effusion  Aortic root normal size  Gastrointestinal: Negative for abdominal pain, blood in stool, constipation, diarrhea, nausea and vomiting  Colonoscopy 04/2016   Endocrine: Negative for polydipsia and polyuria  Genitourinary: Negative for difficulty urinating  Prostate CA s/p XRT 2006  He is followed by urology    He has received 2 ADT injections for elevated PSA     Lab Results       Component                Value               Date                       PSA 0 2                 12/29/2020                 PSA                      2 8                 09/24/2020                 PSA                      5 2 (H)             08/20/2020               Musculoskeletal: Negative for arthralgias, gait problem and myalgias  OA S/p left THR 01/2017  Skin: Negative for rash  History of BCCs/SCCs followed by Dermatology   Allergic/Immunologic: Negative for environmental allergies  Neurological: Negative for dizziness and headaches  Hematological: Negative for adenopathy  Does not bruise/bleed easily  Psychiatric/Behavioral: Negative for dysphoric mood and sleep disturbance  Objective:      /64 (BP Location: Left arm, Patient Position: Sitting, Cuff Size: Large)   Pulse 68   Temp 97 7 °F (36 5 °C)   Resp 17   Ht 5' 7 5" (1 715 m)   Wt 73 kg (161 lb)   SpO2 99%   BMI 24 84 kg/m²     BP Readings from Last 3 Encounters:   03/18/21 142/64   11/12/20 148/84   04/14/20 124/73       Wt Readings from Last 3 Encounters:   03/18/21 73 kg (161 lb)   11/12/20 71 8 kg (158 lb 4 8 oz)   04/14/20 71 7 kg (158 lb)        Physical Exam  Vitals signs and nursing note reviewed  Constitutional:       General: He is not in acute distress  Appearance: He is well-developed  HENT:      Right Ear: Tympanic membrane normal       Left Ear: Tympanic membrane normal    Eyes:      General: No scleral icterus  Extraocular Movements: Extraocular movements intact  Conjunctiva/sclera: Conjunctivae normal       Pupils: Pupils are equal, round, and reactive to light  Neck:      Thyroid: No thyroid mass or thyromegaly  Vascular: No carotid bruit or JVD  Trachea: No tracheal deviation  Cardiovascular:      Rate and Rhythm: Normal rate and regular rhythm  Pulses:           Carotid pulses are 2+ on the right side and 2+ on the left side  Heart sounds: Normal heart sounds  No murmur  No gallop      Pulmonary:      Effort: Pulmonary effort is normal  No respiratory distress  Breath sounds: Normal breath sounds  No wheezing or rales  Abdominal:      General: Bowel sounds are normal  There is no distension or abdominal bruit  Palpations: Abdomen is soft  There is no hepatomegaly, splenomegaly or mass  Tenderness: There is no abdominal tenderness  There is no guarding or rebound  Musculoskeletal:      Right lower leg: No edema  Left lower leg: No edema  Lymphadenopathy:      Cervical: No cervical adenopathy  Upper Body:      Right upper body: No supraclavicular adenopathy  Left upper body: No supraclavicular adenopathy  Skin:     Findings: No rash  Nails: There is no clubbing  Neurological:      General: No focal deficit present  Mental Status: He is alert and oriented to person, place, and time     Psychiatric:         Mood and Affect: Mood normal

## 2021-03-18 NOTE — PATIENT INSTRUCTIONS
Medicare Preventive Visit Patient Instructions  Thank you for completing your Welcome to Medicare Visit or Medicare Annual Wellness Visit today  Your next wellness visit will be due in one year (3/19/2022)  The screening/preventive services that you may require over the next 5-10 years are detailed below  Some tests may not apply to you based off risk factors and/or age  Screening tests ordered at today's visit but not completed yet may show as past due  Also, please note that scanned in results may not display below  Preventive Screenings:  Service Recommendations Previous Testing/Comments   Colorectal Cancer Screening  · Colonoscopy    · Fecal Occult Blood Test (FOBT)/Fecal Immunochemical Test (FIT)  · Fecal DNA/Cologuard Test  · Flexible Sigmoidoscopy Age: 54-65 years old   Colonoscopy: every 10 years (May be performed more frequently if at higher risk)  OR  FOBT/FIT: every 1 year  OR  Cologuard: every 3 years  OR  Sigmoidoscopy: every 5 years  Screening may be recommended earlier than age 48 if at higher risk for colorectal cancer  Also, an individualized decision between you and your healthcare provider will decide whether screening between the ages of 74-80 would be appropriate   Colonoscopy: 04/26/2016  FOBT/FIT: Not on file  Cologuard: Not on file  Sigmoidoscopy: Not on file    Screening Not Indicated     Prostate Cancer Screening Individualized decision between patient and health care provider in men between ages of 53-78   Medicare will cover every 12 months beginning on the day after your 50th birthday PSA: 0 2 ng/mL     History Prostate Cancer  Screening Not Indicated     Hepatitis C Screening Once for adults born between 1945 and 1965  More frequently in patients at high risk for Hepatitis C Hep C Antibody: Not on file        Diabetes Screening 1-2 times per year if you're at risk for diabetes or have pre-diabetes Fasting glucose: 97 mg/dL   A1C: 5 8 %    Screening Not Indicated  History Diabetes Cholesterol Screening Once every 5 years if you don't have a lipid disorder  May order more often based on risk factors  Lipid panel: 08/20/2020    Screening Not Indicated  History Lipid Disorder      Other Preventive Screenings Covered by Medicare:  1  Abdominal Aortic Aneurysm (AAA) Screening: covered once if your at risk  You're considered to be at risk if you have a family history of AAA or a male between the age of 73-68 who smoking at least 100 cigarettes in your lifetime  2  Lung Cancer Screening: covers low dose CT scan once per year if you meet all of the following conditions: (1) Age 50-69; (2) No signs or symptoms of lung cancer; (3) Current smoker or have quit smoking within the last 15 years; (4) You have a tobacco smoking history of at least 30 pack years (packs per day x number of years you smoked); (5) You get a written order from a healthcare provider  3  Glaucoma Screening: covered annually if you're considered high risk: (1) You have diabetes OR (2) Family history of glaucoma OR (3)  aged 48 and older OR (3)  American aged 72 and older  3  Osteoporosis Screening: covered every 2 years if you meet one of the following conditions: (1) Have a vertebral abnormality; (2) On glucocorticoid therapy for more than 3 months; (3) Have primary hyperparathyroidism; (4) On osteoporosis medications and need to assess response to drug therapy  5  HIV Screening: covered annually if you're between the age of 12-76  Also covered annually if you are younger than 13 and older than 72 with risk factors for HIV infection  For pregnant patients, it is covered up to 3 times per pregnancy      Immunizations:  Immunization Recommendations   Influenza Vaccine Annual influenza vaccination during flu season is recommended for all persons aged >= 6 months who do not have contraindications   Pneumococcal Vaccine (Prevnar and Pneumovax)  * Prevnar = PCV13  * Pneumovax = PPSV23 Adults 25-60 years old: 1-3 doses may be recommended based on certain risk factors  Adults 72 years old: Prevnar (PCV13) vaccine recommended followed by Pneumovax (PPSV23) vaccine  If already received PPSV23 since turning 65, then PCV13 recommended at least one year after PPSV23 dose  Hepatitis B Vaccine 3 dose series if at intermediate or high risk (ex: diabetes, end stage renal disease, liver disease)   Tetanus (Td) Vaccine - COST NOT COVERED BY MEDICARE PART B Following completion of primary series, a booster dose should be given every 10 years to maintain immunity against tetanus  Td may also be given as tetanus wound prophylaxis  Tdap Vaccine - COST NOT COVERED BY MEDICARE PART B Recommended at least once for all adults  For pregnant patients, recommended with each pregnancy  Shingles Vaccine (Shingrix) - COST NOT COVERED BY MEDICARE PART B  2 shot series recommended in those aged 48 and above     Health Maintenance Due:      Topic Date Due    Colonoscopy Surveillance  04/26/2021     Immunizations Due:  There are no preventive care reminders to display for this patient  Advance Directives   What are advance directives? Advance directives are legal documents that state your wishes and plans for medical care  These plans are made ahead of time in case you lose your ability to make decisions for yourself  Advance directives can apply to any medical decision, such as the treatments you want, and if you want to donate organs  What are the types of advance directives? There are many types of advance directives, and each state has rules about how to use them  You may choose a combination of any of the following:  · Living will: This is a written record of the treatment you want  You can also choose which treatments you do not want, which to limit, and which to stop at a certain time  This includes surgery, medicine, IV fluid, and tube feedings  · Durable power of  for healthcare [facility]):   This is a written record that states who you want to make healthcare choices for you when you are unable to make them for yourself  This person, called a proxy, is usually a family member or a friend  You may choose more than 1 proxy  · Do not resuscitate (DNR) order:  A DNR order is used in case your heart stops beating or you stop breathing  It is a request not to have certain forms of treatment, such as CPR  A DNR order may be included in other types of advance directives  · Medical directive: This covers the care that you want if you are in a coma, near death, or unable to make decisions for yourself  You can list the treatments you want for each condition  Treatment may include pain medicine, surgery, blood transfusions, dialysis, IV or tube feedings, and a ventilator (breathing machine)  · Values history: This document has questions about your views, beliefs, and how you feel and think about life  This information can help others choose the care that you would choose  Why are advance directives important? An advance directive helps you control your care  Although spoken wishes may be used, it is better to have your wishes written down  Spoken wishes can be misunderstood, or not followed  Treatments may be given even if you do not want them  An advance directive may make it easier for your family to make difficult choices about your care  © Copyright [employer] 2018 Information is for End User's use only and may not be sold, redistributed or otherwise used for commercial purposes   All illustrations and images included in CareNotes® are the copyrighted property of [employer]  or [address] [place] [address] [employer]

## 2021-03-19 ENCOUNTER — REMOTE DEVICE CLINIC VISIT (OUTPATIENT)
Dept: CARDIOLOGY CLINIC | Facility: CLINIC | Age: 86
End: 2021-03-19
Payer: MEDICARE

## 2021-03-19 DIAGNOSIS — Z95.0 PRESENCE OF CARDIAC PACEMAKER: Primary | ICD-10-CM

## 2021-03-19 PROCEDURE — 93294 REM INTERROG EVL PM/LDLS PM: CPT | Performed by: INTERNAL MEDICINE

## 2021-03-19 PROCEDURE — 93296 REM INTERROG EVL PM/IDS: CPT | Performed by: INTERNAL MEDICINE

## 2021-03-19 NOTE — PROGRESS NOTES
Results for orders placed or performed in visit on 03/19/21   Cardiac EP device report    Narrative    MDT DUAL PPM - NOT MRI CONDITIONAL  CARELINK TRANSMISSION: BATTERY VOLTAGE ADEQUATE (8 5 YRS)  AP: 97 2%  : 98 8% (>40%~AVB)  ALL AVAILABLE LEAD PARAMETERS WITHIN NORMAL LIMITS  NO SIGNIFICANT HIGH RATE EPISODES  PACEMAKER FUNCTIONING APPROPRIATELY    [address] [place] [address]

## 2021-03-25 ENCOUNTER — APPOINTMENT (OUTPATIENT)
Dept: LAB | Facility: CLINIC | Age: 86
End: 2021-03-25
Payer: MEDICARE

## 2021-03-25 ENCOUNTER — TRANSCRIBE ORDERS (OUTPATIENT)
Dept: LAB | Facility: CLINIC | Age: 86
End: 2021-03-25

## 2021-03-25 DIAGNOSIS — N40.1 ENLARGED PROSTATE WITH URINARY OBSTRUCTION: ICD-10-CM

## 2021-03-25 DIAGNOSIS — N40.1 ENLARGED PROSTATE WITH URINARY OBSTRUCTION: Primary | ICD-10-CM

## 2021-03-25 DIAGNOSIS — N13.8 ENLARGED PROSTATE WITH URINARY OBSTRUCTION: Primary | ICD-10-CM

## 2021-03-25 DIAGNOSIS — N13.8 ENLARGED PROSTATE WITH URINARY OBSTRUCTION: ICD-10-CM

## 2021-03-25 LAB
ALBUMIN SERPL BCP-MCNC: 4 G/DL (ref 3.5–5)
ALP SERPL-CCNC: 55 U/L (ref 46–116)
ALT SERPL W P-5'-P-CCNC: 31 U/L (ref 12–78)
ANION GAP SERPL CALCULATED.3IONS-SCNC: 3 MMOL/L (ref 4–13)
AST SERPL W P-5'-P-CCNC: 22 U/L (ref 5–45)
BASOPHILS # BLD AUTO: 0.03 THOUSANDS/ΜL (ref 0–0.1)
BASOPHILS NFR BLD AUTO: 1 % (ref 0–1)
BILIRUB SERPL-MCNC: 0.68 MG/DL (ref 0.2–1)
BUN SERPL-MCNC: 22 MG/DL (ref 5–25)
CALCIUM SERPL-MCNC: 9.5 MG/DL (ref 8.3–10.1)
CHLORIDE SERPL-SCNC: 106 MMOL/L (ref 100–108)
CHOLEST SERPL-MCNC: 156 MG/DL (ref 50–200)
CO2 SERPL-SCNC: 30 MMOL/L (ref 21–32)
CREAT SERPL-MCNC: 0.92 MG/DL (ref 0.6–1.3)
EOSINOPHIL # BLD AUTO: 0.41 THOUSAND/ΜL (ref 0–0.61)
EOSINOPHIL NFR BLD AUTO: 8 % (ref 0–6)
ERYTHROCYTE [DISTWIDTH] IN BLOOD BY AUTOMATED COUNT: 12.9 % (ref 11.6–15.1)
GFR SERPL CREATININE-BSD FRML MDRD: 75 ML/MIN/1.73SQ M
GLUCOSE P FAST SERPL-MCNC: 108 MG/DL (ref 65–99)
HCT VFR BLD AUTO: 41.2 % (ref 36.5–49.3)
HDLC SERPL-MCNC: 46 MG/DL
HGB BLD-MCNC: 13.1 G/DL (ref 12–17)
IMM GRANULOCYTES # BLD AUTO: 0.01 THOUSAND/UL (ref 0–0.2)
IMM GRANULOCYTES NFR BLD AUTO: 0 % (ref 0–2)
LDLC SERPL CALC-MCNC: 87 MG/DL (ref 0–100)
LYMPHOCYTES # BLD AUTO: 0.83 THOUSANDS/ΜL (ref 0.6–4.47)
LYMPHOCYTES NFR BLD AUTO: 17 % (ref 14–44)
MCH RBC QN AUTO: 30.4 PG (ref 26.8–34.3)
MCHC RBC AUTO-ENTMCNC: 31.8 G/DL (ref 31.4–37.4)
MCV RBC AUTO: 96 FL (ref 82–98)
MONOCYTES # BLD AUTO: 0.48 THOUSAND/ΜL (ref 0.17–1.22)
MONOCYTES NFR BLD AUTO: 10 % (ref 4–12)
NEUTROPHILS # BLD AUTO: 3.15 THOUSANDS/ΜL (ref 1.85–7.62)
NEUTS SEG NFR BLD AUTO: 64 % (ref 43–75)
NONHDLC SERPL-MCNC: 110 MG/DL
NRBC BLD AUTO-RTO: 0 /100 WBCS
PLATELET # BLD AUTO: 196 THOUSANDS/UL (ref 149–390)
PMV BLD AUTO: 11.3 FL (ref 8.9–12.7)
POTASSIUM SERPL-SCNC: 4.3 MMOL/L (ref 3.5–5.3)
PROT SERPL-MCNC: 7.3 G/DL (ref 6.4–8.2)
PSA SERPL-MCNC: 0.1 NG/ML (ref 0–4)
RBC # BLD AUTO: 4.31 MILLION/UL (ref 3.88–5.62)
SODIUM SERPL-SCNC: 139 MMOL/L (ref 136–145)
TRIGL SERPL-MCNC: 113 MG/DL
WBC # BLD AUTO: 4.91 THOUSAND/UL (ref 4.31–10.16)

## 2021-03-25 PROCEDURE — 80061 LIPID PANEL: CPT | Performed by: FAMILY MEDICINE

## 2021-03-25 PROCEDURE — 36415 COLL VENOUS BLD VENIPUNCTURE: CPT | Performed by: FAMILY MEDICINE

## 2021-03-25 PROCEDURE — 80053 COMPREHEN METABOLIC PANEL: CPT | Performed by: FAMILY MEDICINE

## 2021-03-25 PROCEDURE — 84153 ASSAY OF PSA TOTAL: CPT

## 2021-03-25 PROCEDURE — 85025 COMPLETE CBC W/AUTO DIFF WBC: CPT | Performed by: FAMILY MEDICINE

## 2021-03-26 NOTE — RESULT ENCOUNTER NOTE
Mr Lora Taylor all of your labs are normal except for your fasting blood sugar at 108    Continue with your current medications

## 2021-04-05 DIAGNOSIS — E11.9 TYPE 2 DIABETES MELLITUS WITHOUT COMPLICATION, WITHOUT LONG-TERM CURRENT USE OF INSULIN (HCC): ICD-10-CM

## 2021-04-05 RX ORDER — GLIMEPIRIDE 1 MG/1
TABLET ORAL
Qty: 90 TABLET | Refills: 3 | Status: SHIPPED | OUTPATIENT
Start: 2021-04-05 | End: 2022-04-22 | Stop reason: SDUPTHER

## 2021-04-08 LAB
LEFT EYE DIABETIC RETINOPATHY: NORMAL
RIGHT EYE DIABETIC RETINOPATHY: NORMAL

## 2021-05-28 NOTE — PROGRESS NOTES
Cardiology Follow Up    Onofre Kessler    1934  [id number]  [facility]  [address] [place]  [address]  [phone]  [phone]    1  Essential (primary) hypertension     2  Pure hypercholesterolemia     3  Coronary arteriosclerosis     4  Cardiac pacemaker in situ         Interval History: Patient is here for a follow-up visit   His most recent Medtronic pacer PPM 3/2021 demonstrated an appropriately functioning device with no significant arrhythmia noted  He has a prior history of CAD with PCI of the LAD   Most recent echocardiogram done February 2018 demonstrated preserved LV systolic function and no significant valvular heart disease   Patient had a lipid profile done  March 2021 which demonstrated total cholesterol of 156 with an HDL of 46 and a calculated LDL of 87  He is on atorvastatin   He has prostate cancer and has ongoing follow-up with urology  Patient has been well  He has had no chest pain or significant dyspnea  As usual his wife is here today      Patient Active Problem List   Diagnosis    Aortic valve regurgitation    Arteriosclerosis of coronary artery    Carpal tunnel syndrome    Disc degeneration, lumbar    Facet arthritis of lumbar region    Glaucoma    Hyperlipidemia    Hypertension    Lumbar canal stenosis    Mitral regurgitation    Prostate cancer (HCC)    Type 2 diabetes mellitus (HCC)    SSS (sick sinus syndrome) (HCC)     Past Medical History:   Diagnosis Date    Basal cell carcinoma     Benign polyp of large intestine     Osteoarthritis of left hip     last assessed [id number]    Piriformis syndrome of left side     last assessed [id number]     Social History     Socioeconomic History    Marital status: /Civil Union     Spouse name: Not on file    Number of children: Not on file    Years of education: Not on file    Highest education level: Not on file Occupational History    Not on file   Social Needs    Financial resource strain: Not on file    Food insecurity     Worry: Not on file     Inability: Not on file    Transportation needs     Medical: Not on file     Non-medical: Not on file   Tobacco Use    Smoking status: Never Smoker    Smokeless tobacco: Never Used   Substance and Sexual Activity    Alcohol use: No    Drug use: No    Sexual activity: Not Currently     Partners: Female     Birth control/protection: None   Lifestyle    Physical activity     Days per week: Not on file     Minutes per session: Not on file    Stress: Not on file   Relationships    Social connections     Talks on phone: Not on file     Gets together: Not on file     Attends Sikh service: Not on file     Active member of club or organization: Not on file     Attends meetings of clubs or organizations: Not on file     Relationship status: Not on file    Intimate partner violence     Fear of current or ex partner: Not on file     Emotionally abused: Not on file     Physically abused: Not on file     Forced sexual activity: Not on file   Other Topics Concern    Not on file   Social History Narrative    Not on file      Family History   Problem Relation Age of Onset    Coronary artery disease Mother     Diabetes Maternal Grandmother      Past Surgical History:   Procedure Laterality Date    CARDIAC PACEMAKER PLACEMENT  05/01/2005    CATARACT EXTRACTION Left 10/01/1995    CATARACT EXTRACTION Right 02/01/2000    CORONARY ANGIOPLASTY  01/01/2010 2010    CORONARY ANGIOPLASTY WITH STENT PLACEMENT  11/01/2002    INGUINAL HERNIA REPAIR Right 01/01/1998    INGUINAL HERNIA REPAIR Left 01/01/2009 2009    LUMBAR LAMINECTOMY  01/01/2002 2002    REPLACEMENT TOTAL KNEE Right 06/01/2010    right total knee replacement with complications    RHIZOTOMY      TONSILLECTOMY AND ADENOIDECTOMY  01/01/1937    1937    TOTAL HIP ARTHROPLASTY Left 01/18/2017       Current Outpatient Medications:     aspirin 81 MG tablet, Take 1 tablet by mouth daily , Disp: , Rfl:     atorvastatin (LIPITOR) 10 mg tablet, TAKE 1 TABLET DAILY, Disp: 90 tablet, Rfl: 3    cephalexin (KEFLEX) 500 mg capsule, For dental visits, Disp: , Rfl: 0    glimepiride (AMARYL) 1 mg tablet, TAKE 1 TABLET DAILY, Disp: 90 tablet, Rfl: 3    metoprolol tartrate (LOPRESSOR) 25 mg tablet, Takes 50 mg in AM, 25 mg in PM), Disp: 90 tablet, Rfl: 3    metoprolol tartrate (LOPRESSOR) 50 mg tablet, TAKE 1 TABLET DAILY IN THE MORNING, Disp: 90 tablet, Rfl: 3    Multiple Vitamins-Minerals (MULTIVITAMIN ADULT PO), Take 1 tablet by mouth every other day , Disp: , Rfl:     glucose blood (ONE TOUCH ULTRA TEST) test strip, Patient tests once daily  , Disp: 100 each, Rfl: 3    OneTouch Delica Lancets 88Z MISC, by Does not apply route daily Patient Tests Daily, Disp: 100 each, Rfl: 3  Allergies   Allergen Reactions    Penicillins Rash    Plavix [Clopidogrel] Rash       Labs:not applicable  Imaging: No results found  Review of Systems:  Review of Systems   All other systems reviewed and are negative  Physical Exam:  /70 (BP Location: Right arm, Cuff Size: Standard)   Pulse 72   Ht 5' 7 5" (1 715 m)   Wt 72 6 kg (160 lb)   BMI 24 69 kg/m²   Physical Exam  Vitals signs reviewed  Constitutional:       Appearance: He is well-developed  HENT:      Head: Normocephalic and atraumatic  Eyes:      Conjunctiva/sclera: Conjunctivae normal       Pupils: Pupils are equal, round, and reactive to light  Neck:      Musculoskeletal: Normal range of motion and neck supple  Cardiovascular:      Rate and Rhythm: Normal rate  Heart sounds: Normal heart sounds  Pulmonary:      Effort: Pulmonary effort is normal       Breath sounds: Normal breath sounds  Skin:     General: Skin is warm and dry  Neurological:      Mental Status: He is alert and oriented to person, place, and time           Discussion/Summary:I will continue the patient's present medical regimen  The patient appears well compensated  I have asked the patient to call if there is a problem in the interim otherwise I will see the patient in six months time

## 2021-06-07 ENCOUNTER — OFFICE VISIT (OUTPATIENT)
Dept: CARDIOLOGY CLINIC | Facility: CLINIC | Age: 86
End: 2021-06-07
Payer: MEDICARE

## 2021-06-07 VITALS
BODY MASS INDEX: 24.25 KG/M2 | WEIGHT: 160 LBS | HEART RATE: 72 BPM | HEIGHT: 68 IN | SYSTOLIC BLOOD PRESSURE: 140 MMHG | DIASTOLIC BLOOD PRESSURE: 70 MMHG

## 2021-06-07 DIAGNOSIS — Z95.0 CARDIAC PACEMAKER IN SITU: ICD-10-CM

## 2021-06-07 DIAGNOSIS — I10 ESSENTIAL (PRIMARY) HYPERTENSION: Primary | ICD-10-CM

## 2021-06-07 DIAGNOSIS — I25.10 CORONARY ARTERIOSCLEROSIS: ICD-10-CM

## 2021-06-07 DIAGNOSIS — E78.00 PURE HYPERCHOLESTEROLEMIA: ICD-10-CM

## 2021-06-07 PROCEDURE — 99214 OFFICE O/P EST MOD 30 MIN: CPT | Performed by: INTERNAL MEDICINE

## 2021-06-21 ENCOUNTER — HOSPITAL ENCOUNTER (OUTPATIENT)
Dept: VASCULAR ULTRASOUND | Facility: HOSPITAL | Age: 86
Discharge: HOME | End: 2021-06-21
Payer: MEDICARE

## 2021-06-21 ENCOUNTER — OFFICE VISIT (OUTPATIENT)
Dept: FAMILY MEDICINE CLINIC | Facility: CLINIC | Age: 86
End: 2021-06-21
Payer: MEDICARE

## 2021-06-21 VITALS
TEMPERATURE: 97.7 F | BODY MASS INDEX: 23.05 KG/M2 | DIASTOLIC BLOOD PRESSURE: 80 MMHG | HEIGHT: 70 IN | SYSTOLIC BLOOD PRESSURE: 136 MMHG | WEIGHT: 161 LBS | HEART RATE: 80 BPM | OXYGEN SATURATION: 99 % | RESPIRATION RATE: 17 BRPM

## 2021-06-21 DIAGNOSIS — M25.562 ACUTE PAIN OF LEFT KNEE: Primary | ICD-10-CM

## 2021-06-21 DIAGNOSIS — I83.811 VARICOSE VEINS OF RIGHT LOWER EXTREMITY WITH PAIN: ICD-10-CM

## 2021-06-21 DIAGNOSIS — M25.562 ACUTE PAIN OF LEFT KNEE: ICD-10-CM

## 2021-06-21 PROCEDURE — 99213 OFFICE O/P EST LOW 20 MIN: CPT | Performed by: PHYSICIAN ASSISTANT

## 2021-06-21 PROCEDURE — 93971 EXTREMITY STUDY: CPT

## 2021-06-21 NOTE — PATIENT INSTRUCTIONS
Zuñiga Cyst   AMBULATORY CARE:   A Zuñiga cyst  is a bulging lump of fluid behind your knee  A Baker cyst can develop if you have a knee injury, or a condition such as osteoarthritis or a connective tissue disorder  A Baker cyst may also be called a popliteal cyst   Common signs and symptoms:   · A lump or swelling in the back of your knee when you stand or walk    · Knee swelling that goes away when you bend your knee    · Knee pain    · Stiffness or tightness in your knee that may get worse with movement    Seek care immediately if:   · You have severe pain  · You have bruising on the ankle below the cyst     · Your calf turns blue below the cyst     · Your calf or knee is swollen or bleeding  Call your doctor if:   · You have a fever  · Your pain does not improve with medicine  · You have questions or concerns about your condition or care  Treatment  may not be needed  A Baker cyst will usually go away on its own  If it is large and painful, you may need any of the following:  · NSAIDs , such as ibuprofen, help decrease swelling, pain, and fever  NSAIDs can cause stomach bleeding or kidney problems in certain people  If you take blood thinner medicine, always ask your healthcare provider if NSAIDs are safe for you  Always read the medicine label and follow directions  · Steroid medicine  may be injected into the cyst to decrease fluid, redness, pain, and swelling  · Aspiration  is a procedure used to drain fluid from the cyst through a needle  · Arthroscopic surgery  is done to remove the cyst completely or repair any torn or damaged cartilage  A scope and small surgical instruments are inserted through a small incision in your knee  A scope is a flexible tube with a light, camera, and magnifying glass on the end  Care for your knee:   · Rest as needed  Limit movement as your knee heals  This will help decrease the risk of more damage to your knee   You may need crutches to take weight off your injured knee  Use crutches as directed  · Ice your knee  Ice helps decrease swelling and pain  Use an ice pack, or put ice in a plastic bag  Cover it with a towel before you place it on your skin  Ice your knee for 15 to 20 minutes, 3 to 4 times each day  Do this for 2 to 3 days  · Support your knee  Wrap your knee with an elastic bandage  Ask your healthcare provider if you need a brace for more support  This will help decrease swelling and movement so your knee can heal     · Elevate your knee  Use pillows to raise your knee above the level of your heart as often as you can  This will help decrease swelling  Do not put the pillow directly under your knee  Put it under your calf instead  · Go to physical therapy as directed  A physical therapist teaches you exercises to help improve movement and strength, and to decrease pain  Follow up with your doctor as directed:  Write down your questions so you remember to ask them during your visits  © Copyright [address] Information is for End User's use only and may not be sold, redistributed or otherwise used for commercial purposes  All illustrations and images included in CareNotes® are the copyrighted property of A D A M [employer]  or [address] [place] [address]  The above information is an  only  It is not intended as medical advice for individual conditions or treatments  Talk to your doctor, nurse or pharmacist before following any medical regimen to see if it is safe and effective for you

## 2021-06-21 NOTE — PROGRESS NOTES
Assessment/Plan:     Diagnoses and all orders for this visit:    Acute pain of left knee  Varicose veins of right lower extremity with pain   Discussed likely Baker's cyst     Courage patient continue using ice and rest     Discussed limitations of using NSAIDs encouraged use of Tylenol  Ultrasound showed baker cyst  -     VAS lower limb venous duplex study, unilateral/limited; Future          Subjective:    Patient ID: Karan Zeng is a 80 y o  male  Pt is presenting today for Posterior right knee pain occurring 2 days ago  Occurred all the sudden with a pop after getting off his riding lawnmower  Patient has been resting at home and using a knee compression sleeve  Knee Pain   The incident occurred in the yard  The injury mechanism was a twisting injury  Pertinent negatives include no inability to bear weight, loss of motion, loss of sensation, numbness or tingling  The following portions of the patient's history were reviewed and updated as appropriate: allergies, current medications and problem list     Review of Systems   Musculoskeletal: Positive for arthralgias (right knee)  Neurological: Negative for tingling and numbness  Objective:  /80 (BP Location: Left arm, Patient Position: Sitting, Cuff Size: Large)   Pulse 80   Temp 97 7 °F (36 5 °C)   Resp 17   Ht 5' 10" (1 778 m)   Wt 73 kg (161 lb)   SpO2 99%   BMI 23 10 kg/m²      Physical Exam  Vitals reviewed  Constitutional:       General: He is not in acute distress  Appearance: He is well-developed  He is not diaphoretic  HENT:      Head: Normocephalic and atraumatic  Eyes:      Pupils: Pupils are equal, round, and reactive to light  Cardiovascular:      Rate and Rhythm: Normal rate and regular rhythm  Heart sounds: Normal heart sounds  No murmur heard  No friction rub  No gallop  Pulmonary:      Effort: Pulmonary effort is normal  No respiratory distress        Breath sounds: Normal breath sounds  No wheezing  Musculoskeletal:      Right knee: Swelling (posterior soft mobile) present  No lacerations  Normal range of motion  Normal patellar mobility  Skin:     General: Skin is warm and dry  Neurological:      Mental Status: He is alert and oriented to person, place, and time  Psychiatric:         Behavior: Behavior normal          Thought Content:  Thought content normal

## 2021-06-22 PROCEDURE — 93971 EXTREMITY STUDY: CPT | Performed by: SURGERY

## 2021-07-06 ENCOUNTER — IN-CLINIC DEVICE VISIT (OUTPATIENT)
Dept: CARDIOLOGY CLINIC | Facility: MEDICAL CENTER | Age: 86
End: 2021-07-06
Payer: MEDICARE

## 2021-07-06 DIAGNOSIS — Z95.0 PRESENCE OF PERMANENT CARDIAC PACEMAKER: Primary | ICD-10-CM

## 2021-07-06 PROCEDURE — 93280 PM DEVICE PROGR EVAL DUAL: CPT | Performed by: INTERNAL MEDICINE

## 2021-07-06 NOTE — PROGRESS NOTES
MDT DUAL PPM - NOT MRI CONDITIONAL   DEVICE INTERROGATED IN THE [place] OFFICE:  BATTERY VOLTAGE ADEQUATE (8 5 YR)    AP 96 1%  96 3% (>40%/AVB/DEPENDENT)   ALL AVAILABLE LEAD PARAMETERS WITHIN NORMAL LIMITS    NO HIGH RATE EPISODES   NO PROGRAMMING CHANGES MADE TO DEVICE PARAMETERS   NORMAL DEVICE FUNCTION   RG

## 2021-07-09 ENCOUNTER — APPOINTMENT (OUTPATIENT)
Dept: LAB | Facility: CLINIC | Age: 86
End: 2021-07-09
Payer: MEDICARE

## 2021-07-09 DIAGNOSIS — C61 MALIGNANT NEOPLASM OF PROSTATE (HCC): ICD-10-CM

## 2021-07-09 LAB — PSA SERPL-MCNC: 0.1 NG/ML (ref 0–4)

## 2021-07-09 PROCEDURE — 84153 ASSAY OF PSA TOTAL: CPT

## 2021-09-13 DIAGNOSIS — E78.00 PURE HYPERCHOLESTEROLEMIA: ICD-10-CM

## 2021-09-14 RX ORDER — ATORVASTATIN CALCIUM 10 MG/1
TABLET, FILM COATED ORAL
Qty: 90 TABLET | Refills: 3 | Status: SHIPPED | OUTPATIENT
Start: 2021-09-14

## 2021-10-07 ENCOUNTER — REMOTE DEVICE CLINIC VISIT (OUTPATIENT)
Dept: CARDIOLOGY CLINIC | Facility: CLINIC | Age: 86
End: 2021-10-07
Payer: MEDICARE

## 2021-10-07 DIAGNOSIS — Z95.0 CARDIAC PACEMAKER IN SITU: Primary | ICD-10-CM

## 2021-10-07 PROCEDURE — 93294 REM INTERROG EVL PM/LDLS PM: CPT | Performed by: INTERNAL MEDICINE

## 2021-10-07 PROCEDURE — 93296 REM INTERROG EVL PM/IDS: CPT | Performed by: INTERNAL MEDICINE

## 2021-10-14 ENCOUNTER — APPOINTMENT (OUTPATIENT)
Dept: LAB | Facility: CLINIC | Age: 86
End: 2021-10-14
Payer: MEDICARE

## 2021-10-14 DIAGNOSIS — C61 MALIGNANT NEOPLASM OF PROSTATE (HCC): ICD-10-CM

## 2021-10-14 LAB — PSA SERPL-MCNC: <0.1 NG/ML (ref 0–4)

## 2021-10-14 PROCEDURE — 84153 ASSAY OF PSA TOTAL: CPT

## 2021-11-01 DIAGNOSIS — E11.9 TYPE 2 DIABETES MELLITUS WITHOUT COMPLICATION, WITHOUT LONG-TERM CURRENT USE OF INSULIN (HCC): ICD-10-CM

## 2021-11-01 RX ORDER — LANCETS 33 GAUGE
1 EACH MISCELLANEOUS DAILY
Qty: 100 EACH | Refills: 1 | Status: SHIPPED | OUTPATIENT
Start: 2021-11-01

## 2021-11-01 RX ORDER — LANCETS 33 GAUGE
1 EACH MISCELLANEOUS DAILY
COMMUNITY
End: 2021-11-01

## 2021-11-17 ENCOUNTER — IMMUNIZATIONS (OUTPATIENT)
Dept: FAMILY MEDICINE CLINIC | Facility: CLINIC | Age: 86
End: 2021-11-17
Payer: MEDICARE

## 2021-11-17 DIAGNOSIS — Z23 ENCOUNTER FOR IMMUNIZATION: Primary | ICD-10-CM

## 2021-11-17 PROCEDURE — 90662 IIV NO PRSV INCREASED AG IM: CPT

## 2021-11-17 PROCEDURE — G0008 ADMIN INFLUENZA VIRUS VAC: HCPCS

## 2021-12-09 DIAGNOSIS — I25.10 CORONARY ARTERIOSCLEROSIS: ICD-10-CM

## 2021-12-14 ENCOUNTER — OFFICE VISIT (OUTPATIENT)
Dept: CARDIOLOGY CLINIC | Facility: CLINIC | Age: 86
End: 2021-12-14
Payer: MEDICARE

## 2021-12-14 VITALS
HEIGHT: 70 IN | HEART RATE: 68 BPM | DIASTOLIC BLOOD PRESSURE: 78 MMHG | WEIGHT: 160.1 LBS | OXYGEN SATURATION: 99 % | SYSTOLIC BLOOD PRESSURE: 136 MMHG | BODY MASS INDEX: 22.92 KG/M2

## 2021-12-14 DIAGNOSIS — E78.00 PURE HYPERCHOLESTEROLEMIA: ICD-10-CM

## 2021-12-14 DIAGNOSIS — I10 ESSENTIAL (PRIMARY) HYPERTENSION: Primary | ICD-10-CM

## 2021-12-14 DIAGNOSIS — Z95.0 CARDIAC PACEMAKER IN SITU: ICD-10-CM

## 2021-12-14 DIAGNOSIS — I25.10 CORONARY ARTERIOSCLEROSIS: ICD-10-CM

## 2021-12-14 DIAGNOSIS — I34.0 NON-RHEUMATIC MITRAL REGURGITATION: ICD-10-CM

## 2021-12-14 PROCEDURE — 99214 OFFICE O/P EST MOD 30 MIN: CPT | Performed by: INTERNAL MEDICINE

## 2021-12-14 RX ORDER — LEUPROLIDE ACETATE 22.5 MG
22.5 KIT SUBCUTANEOUS
COMMUNITY

## 2022-01-06 ENCOUNTER — REMOTE DEVICE CLINIC VISIT (OUTPATIENT)
Dept: CARDIOLOGY CLINIC | Facility: CLINIC | Age: 87
End: 2022-01-06
Payer: MEDICARE

## 2022-01-06 DIAGNOSIS — Z95.0 PRESENCE OF CARDIAC PACEMAKER: Primary | ICD-10-CM

## 2022-01-06 PROCEDURE — 93296 REM INTERROG EVL PM/IDS: CPT | Performed by: INTERNAL MEDICINE

## 2022-01-06 PROCEDURE — 93294 REM INTERROG EVL PM/LDLS PM: CPT | Performed by: INTERNAL MEDICINE

## 2022-01-06 NOTE — PROGRESS NOTES
MDT DUAL PPM - NOT MRI CONDITIONAL   [employer]ELINK TRANSMISSION: BATTERY VOLTAGE ADEQUATE (7 5 YRS)  AP 96%  91% (>40%/AVB/AAIR-DDDR 60)  ALL AVAILABLE LEAD PARAMETERS WITHIN NORMAL LIMITS  NO SIGNIFICANT HIGH RATE EPISODES   PACEMAKER FUNCTIONING APPROPRIATELY   ES

## 2022-01-12 ENCOUNTER — APPOINTMENT (OUTPATIENT)
Dept: LAB | Facility: CLINIC | Age: 87
End: 2022-01-12
Payer: MEDICARE

## 2022-01-12 DIAGNOSIS — C61 MALIGNANT NEOPLASM OF PROSTATE (HCC): ICD-10-CM

## 2022-01-12 LAB
ALBUMIN SERPL BCP-MCNC: 4 G/DL (ref 3.5–5)
ALP SERPL-CCNC: 62 U/L (ref 46–116)
ALT SERPL W P-5'-P-CCNC: 27 U/L (ref 12–78)
AST SERPL W P-5'-P-CCNC: 20 U/L (ref 5–45)
BILIRUB DIRECT SERPL-MCNC: 0.12 MG/DL (ref 0–0.2)
BILIRUB SERPL-MCNC: 0.72 MG/DL (ref 0.2–1)
BUN SERPL-MCNC: 22 MG/DL (ref 5–25)
CALCIUM SERPL-MCNC: 9.9 MG/DL (ref 8.3–10.1)
CREAT SERPL-MCNC: 0.97 MG/DL (ref 0.6–1.3)
ERYTHROCYTE [DISTWIDTH] IN BLOOD BY AUTOMATED COUNT: 12.9 % (ref 11.6–15.1)
GFR SERPL CREATININE-BSD FRML MDRD: 69 ML/MIN/1.73SQ M
HCT VFR BLD AUTO: 38.7 % (ref 36.5–49.3)
HGB BLD-MCNC: 12.8 G/DL (ref 12–17)
MCH RBC QN AUTO: 30.5 PG (ref 26.8–34.3)
MCHC RBC AUTO-ENTMCNC: 33.1 G/DL (ref 31.4–37.4)
MCV RBC AUTO: 92 FL (ref 82–98)
PLATELET # BLD AUTO: 205 THOUSANDS/UL (ref 149–390)
PMV BLD AUTO: 11 FL (ref 8.9–12.7)
PROT SERPL-MCNC: 7.6 G/DL (ref 6.4–8.2)
PSA SERPL-MCNC: <0.1 NG/ML (ref 0–4)
RBC # BLD AUTO: 4.19 MILLION/UL (ref 3.88–5.62)
TESTOST SERPL-MCNC: 15 NG/DL (ref 95–948)
WBC # BLD AUTO: 5.61 THOUSAND/UL (ref 4.31–10.16)

## 2022-01-12 PROCEDURE — 84153 ASSAY OF PSA TOTAL: CPT

## 2022-01-12 PROCEDURE — 84520 ASSAY OF UREA NITROGEN: CPT

## 2022-01-12 PROCEDURE — 82310 ASSAY OF CALCIUM: CPT

## 2022-01-12 PROCEDURE — 85027 COMPLETE CBC AUTOMATED: CPT

## 2022-01-12 PROCEDURE — 82565 ASSAY OF CREATININE: CPT

## 2022-01-12 PROCEDURE — 80076 HEPATIC FUNCTION PANEL: CPT

## 2022-01-12 PROCEDURE — 84403 ASSAY OF TOTAL TESTOSTERONE: CPT

## 2022-01-12 PROCEDURE — 36415 COLL VENOUS BLD VENIPUNCTURE: CPT

## 2022-02-28 DIAGNOSIS — I10 ESSENTIAL (PRIMARY) HYPERTENSION: ICD-10-CM

## 2022-03-01 RX ORDER — METOPROLOL TARTRATE 50 MG/1
TABLET, FILM COATED ORAL
Qty: 90 TABLET | Refills: 3 | Status: SHIPPED | OUTPATIENT
Start: 2022-03-01

## 2022-03-15 ENCOUNTER — RA CDI HCC (OUTPATIENT)
Dept: OTHER | Facility: HOSPITAL | Age: 87
End: 2022-03-15

## 2022-03-15 NOTE — PROGRESS NOTES
[address]  coding opportunities       Chart reviewed, no opportunity found: CHART REVIEWED, NO OPPORTUNITY FOUND        Patients Insurance     Medicare Insurance: Medicare

## 2022-03-18 ENCOUNTER — APPOINTMENT (OUTPATIENT)
Dept: LAB | Facility: CLINIC | Age: 87
End: 2022-03-18
Payer: MEDICARE

## 2022-03-21 ENCOUNTER — OFFICE VISIT (OUTPATIENT)
Dept: FAMILY MEDICINE CLINIC | Facility: CLINIC | Age: 87
End: 2022-03-21
Payer: MEDICARE

## 2022-03-21 VITALS
SYSTOLIC BLOOD PRESSURE: 132 MMHG | RESPIRATION RATE: 16 BRPM | HEIGHT: 70 IN | TEMPERATURE: 95.5 F | DIASTOLIC BLOOD PRESSURE: 64 MMHG | BODY MASS INDEX: 23.19 KG/M2 | HEART RATE: 64 BPM | WEIGHT: 162 LBS

## 2022-03-21 DIAGNOSIS — I49.5 SSS (SICK SINUS SYNDROME) (HCC): ICD-10-CM

## 2022-03-21 DIAGNOSIS — M51.36 DISC DEGENERATION, LUMBAR: ICD-10-CM

## 2022-03-21 DIAGNOSIS — I34.0 NONRHEUMATIC MITRAL VALVE REGURGITATION: ICD-10-CM

## 2022-03-21 DIAGNOSIS — E78.00 PURE HYPERCHOLESTEROLEMIA: ICD-10-CM

## 2022-03-21 DIAGNOSIS — Z13.89 ENCOUNTER FOR SCREENING FOR OTHER DISORDER: ICD-10-CM

## 2022-03-21 DIAGNOSIS — I10 PRIMARY HYPERTENSION: ICD-10-CM

## 2022-03-21 DIAGNOSIS — M47.816 FACET ARTHRITIS OF LUMBAR REGION: ICD-10-CM

## 2022-03-21 DIAGNOSIS — C61 PROSTATE CANCER (HCC): ICD-10-CM

## 2022-03-21 DIAGNOSIS — I25.10 ARTERIOSCLEROSIS OF CORONARY ARTERY: ICD-10-CM

## 2022-03-21 DIAGNOSIS — Z00.00 MEDICARE ANNUAL WELLNESS VISIT, SUBSEQUENT: ICD-10-CM

## 2022-03-21 DIAGNOSIS — E11.9 TYPE 2 DIABETES MELLITUS WITHOUT COMPLICATION, WITHOUT LONG-TERM CURRENT USE OF INSULIN (HCC): Primary | ICD-10-CM

## 2022-03-21 PROCEDURE — 99214 OFFICE O/P EST MOD 30 MIN: CPT | Performed by: FAMILY MEDICINE

## 2022-03-21 PROCEDURE — G0439 PPPS, SUBSEQ VISIT: HCPCS | Performed by: FAMILY MEDICINE

## 2022-03-21 PROCEDURE — G0444 DEPRESSION SCREEN ANNUAL: HCPCS | Performed by: FAMILY MEDICINE

## 2022-03-21 PROCEDURE — 1123F ACP DISCUSS/DSCN MKR DOCD: CPT | Performed by: FAMILY MEDICINE

## 2022-03-21 RX ORDER — NEOMYCIN SULFATE, POLYMYXIN B SULFATE, AND DEXAMETHASONE 3.5; 10000; 1 MG/G; [USP'U]/G; MG/G
1 OINTMENT OPHTHALMIC DAILY
COMMUNITY
Start: 2022-03-18

## 2022-03-21 NOTE — PROGRESS NOTES
Assessment/Plan:       Diagnoses and all orders for this visit:    Type 2 diabetes mellitus without complication, without long-term current use of insulin ([facility])  -     Hemoglobin A1C    Primary hypertension    Pure hypercholesterolemia  -     Lipid panel    Arteriosclerosis of coronary artery    SSS (sick sinus syndrome) ([facility])    Nonrheumatic mitral valve regurgitation    Facet arthritis of lumbar region    Disc degeneration, lumbar    Prostate cancer ([facility] [address] )    Medicare annual wellness visit, subsequent    Encounter for screening for other disorder    Other orders  -     neomycin-polymyxin-dexamethasone (MAXITROL) 0 35%-10,000 units/g-0 1%; Administer 1 inch to both eyes in the morning          Continue with current medications  Monitor fingerstick blood sugars  Watch for hypoglycemia  OV 6 months with repeat labs  Follow up with multiple specialists  Patient ID: Darin Mora is a 80 y o  male  Follow up visit  Medications reviewed Type 2 diabetes mellitus  On Glimepiride 1 mg daily  03/2022 A1c 5 9  Urine microalbuminin 6 1 Not on ACE or ARB  No hypoglycemic events  No neuropathy symptoms  He is followed by Podiatry  Current with eye exam  Hypertension blood pressures have been stable on Metoprolol tartrate 50 mg in AM and 25 mg in PM  01/2022 Creatinine 0 97  GFR 69  Hemoglobin 12 8  Hyperlipidemia and CAD on Atorvastatin 10 mg daily lipid profile  03/2022 cholesterol 160  HDL 47  Triglycerides 68  LDL 99  01/2022 LFTs normal   03/2019 TSH 1 78  Recent Results (from the past 504 hour(s))   Hemoglobin A1C    Collection Time: 03/18/22  9:48 AM   Result Value Ref Range    Hemoglobin A1C 5 9 (H) Normal 3 8-5 6%; PreDiabetic 5 7-6 4%;  Diabetic >=6 5%; Glycemic control for adults with diabetes <7 0% %     mg/dl   Lipid panel    Collection Time: 03/18/22  9:48 AM   Result Value Ref Range    Cholesterol 160 See Comment mg/dL    Triglycerides 68 See Comment mg/dL    HDL, Direct 47 >=40 mg/dL LDL Calculated 99 0 - 100 mg/dL    Non-HDL-Chol (CHOL-HDL) 113 mg/dl   Microalbumin / creatinine urine ratio    Collection Time: 03/18/22  9:48 AM   Result Value Ref Range    Creatinine, Ur 77 2 mg/dL    Microalbum  ,U,Random 6 1 0 0 - 20 0 mg/L    Microalb Creat Ratio 8 0 - 30 mg/g creatinine     Lab Results   Component Value Date    WBC 5 61 01/12/2022    HGB 12 8 01/12/2022    HCT 38 7 01/12/2022    MCV 92 01/12/2022     01/12/2022     Lab Results   Component Value Date    SODIUM 139 03/25/2021    K 4 3 03/25/2021     03/25/2021    CO2 30 03/25/2021    BUN 22 01/12/2022    CREATININE 0 97 01/12/2022    GLUC 137 04/07/2016    CALCIUM 9 9 01/12/2022     Lab Results   Component Value Date    ALT 27 01/12/2022    AST 20 01/12/2022    ALKPHOS 62 01/12/2022    BILITOT 0 87 12/22/2015             The following portions of the patient's history were reviewed and updated as appropriate: allergies, current medications, past family history, past medical history, past social history, past surgical history and problem list     Review of Systems   Constitutional: Negative for appetite change, chills, fever and unexpected weight change  HENT: Negative for congestion, ear pain, rhinorrhea, sore throat and trouble swallowing  Eyes: Negative for visual disturbance  Respiratory: Negative for cough, shortness of breath and wheezing  Cardiovascular: Negative for chest pain, palpitations and leg swelling  CAD s/p stents  S/p pacemaker  02/2018  Echocardiogram normal left ventricular systolic function  EF 55%  No regional wall motion abnormalities  Grade 1 diastolic dysfunction  mild mitral regurgitation  Mild aortic regurgitation  Trace TR/NM  No pericardial effusion  Aortic root normal size  Gastrointestinal: Negative for abdominal pain, blood in stool, constipation, diarrhea, nausea and vomiting  Colonoscopy 04/2016   Endocrine: Negative for polydipsia and polyuria     Genitourinary: Negative for difficulty urinating  Prostate CA s/p XRT 2006  He is followed by Urology  He has been receiving ADT injections for elevated PSA  Last dose 02/2022    Lab Results       Component                Value               Date                       PSA                      <0 1                01/12/2022                 PSA                      <0 1                10/14/2021                 PSA                      0 1                 07/09/2021                         Musculoskeletal: Negative for arthralgias, gait problem and myalgias  OA S/p left THR 01/2017  Skin: Negative for rash  History of BCCs/SCCs followed by Dermatology   Allergic/Immunologic: Negative for environmental allergies  Neurological: Negative for dizziness and headaches  Hematological: Negative for adenopathy  Does not bruise/bleed easily  Psychiatric/Behavioral: Negative for dysphoric mood and sleep disturbance  Objective:    /64   Pulse 64   Temp (!) 95 5 °F (35 3 °C)   Resp 16   Ht 5' 10" (1 778 m)   Wt 73 5 kg (162 lb)   BMI 23 24 kg/m²     BP Readings from Last 3 Encounters:   03/21/22 132/64   12/14/21 136/78   06/21/21 136/80     Wt Readings from Last 3 Encounters:   03/21/22 73 5 kg (162 lb)   12/14/21 72 6 kg (160 lb 1 6 oz)   06/21/21 73 kg (161 lb)            Physical Exam  Vitals and nursing note reviewed  Constitutional:       General: He is not in acute distress  Appearance: He is well-developed  HENT:      Right Ear: Tympanic membrane normal       Left Ear: Tympanic membrane normal    Eyes:      General: No scleral icterus  Extraocular Movements: Extraocular movements intact  Conjunctiva/sclera: Conjunctivae normal       Pupils: Pupils are equal, round, and reactive to light  Neck:      Thyroid: No thyroid mass or thyromegaly  Vascular: No carotid bruit or JVD  Trachea: No tracheal deviation     Cardiovascular:      Rate and Rhythm: Normal rate and regular rhythm  Pulses:           Carotid pulses are 2+ on the right side and 2+ on the left side  Heart sounds: Normal heart sounds  No murmur heard  No gallop  Pulmonary:      Effort: Pulmonary effort is normal  No respiratory distress  Breath sounds: Normal breath sounds  No wheezing or rales  Chest:   Breasts:      Right: No supraclavicular adenopathy  Left: No supraclavicular adenopathy  Musculoskeletal:      Right lower leg: No edema  Left lower leg: No edema  Lymphadenopathy:      Cervical: No cervical adenopathy  Upper Body:      Right upper body: No supraclavicular adenopathy  Left upper body: No supraclavicular adenopathy  Skin:     Findings: No rash  Nails: There is no clubbing  Neurological:      General: No focal deficit present  Mental Status: He is alert and oriented to person, place, and time     Psychiatric:         Mood and Affect: Mood normal          Behavior: Behavior normal          Cognition and Memory: Cognition normal

## 2022-03-21 NOTE — PROGRESS NOTES
Assessment and Plan:     Problem List Items Addressed This Visit        Endocrine    Type 2 diabetes mellitus ([facility] [address] ) - Primary    Relevant Orders    Hemoglobin A1C       Cardiovascular and Mediastinum    Arteriosclerosis of coronary artery    Hypertension    Mitral regurgitation    SSS (sick sinus syndrome) ([facility])       Musculoskeletal and Integument    Disc degeneration, lumbar    Facet arthritis of lumbar region       Genitourinary    Prostate cancer ([facility] [address] )       Other    Hyperlipidemia    Relevant Orders    Lipid panel      Other Visit Diagnoses     Medicare annual wellness visit, subsequent        Encounter for screening for other disorder              Depression Screening and Follow-up Plan: Patient was screened for depression during today's encounter  They screened negative with a PHQ-2 score of 0  Preventive health issues were discussed with patient, and age appropriate screening tests were ordered as noted in patient's After Visit Summary  Personalized health advice and appropriate referrals for health education or preventive services given if needed, as noted in patient's After Visit Summary       History of Present Illness:     Patient presents for Medicare Annual Wellness visit    Patient Care Team:  Rudi Jang MD as PCP - General  MD Quoc Magdaleno MD Verdell Moles, MD (Urology)  Chandni Spangler MD (Dermatology)  Amando Lewis DPM (Podiatry)     Problem List:     Patient Active Problem List   Diagnosis    Aortic valve regurgitation    Arteriosclerosis of coronary artery    Carpal tunnel syndrome    Disc degeneration, lumbar    Facet arthritis of lumbar region    Glaucoma    Hyperlipidemia    Hypertension    Lumbar canal stenosis    Mitral regurgitation    Prostate cancer ([facility] [address] )    Type 2 diabetes mellitus ([facility] [address] )    [facility]      Past Medical and Surgical History:     Past Medical History:   Diagnosis Date    Basal cell carcinoma     Benign polyp of large intestine     Osteoarthritis of left hip     last assessed [id number]    Piriformis syndrome of left side     last assessed [id number]     Past Surgical History:   Procedure Laterality Date    CARDIAC PACEMAKER PLACEMENT  05/01/2005    CATARACT EXTRACTION Left 10/01/1995    CATARACT EXTRACTION Right 02/01/2000    CORONARY ANGIOPLASTY  01/01/2010 2010    CORONARY ANGIOPLASTY WITH STENT PLACEMENT  11/01/2002    INGUINAL HERNIA REPAIR Right 01/01/1998    INGUINAL HERNIA REPAIR Left 01/01/2009    2009    LUMBAR LAMINECTOMY  01/01/2002 2002    REPLACEMENT TOTAL KNEE Right 06/01/2010    right total knee replacement with complications    RHIZOTOMY      TONSILLECTOMY AND ADENOIDECTOMY  01/01/1937    1937    TOTAL HIP ARTHROPLASTY Left 01/18/2017      Family History:     Family History   Problem Relation Age of Onset    Coronary artery disease Mother     Diabetes Maternal Grandmother       Social History:     Social History     Socioeconomic History    Marital status: /Civil Union     Spouse name: None    Number of children: None    Years of education: None    Highest education level: None   Occupational History    None   Tobacco Use    Smoking status: Never Smoker    Smokeless tobacco: Never Used   Substance and Sexual Activity    Alcohol use: No    Drug use: No    Sexual activity: Not Currently     Partners: Female     Birth control/protection: None   Other Topics Concern    None   Social History Narrative    None     Social Determinants of Health     Financial Resource Strain: Not on file   Food Insecurity: Not on file   Transportation Needs: Not on file   Physical Activity: Not on file   Stress: Not on file   Social Connections: Not on file   Intimate Partner Violence: Not on file   Housing Stability: Not on file      Medications and Allergies:     Current Outpatient Medications   Medication Sig Dispense Refill    aspirin 81 MG tablet Take 1 tablet by mouth daily Except sun       atorvastatin (LIPITOR) 10 mg tablet TAKE 1 TABLET DAILY 90 tablet 3    cephalexin (KEFLEX) 500 mg capsule For dental visits  0    glimepiride (AMARYL) 1 mg tablet TAKE 1 TABLET DAILY 90 tablet 3    glucose blood (ONE TOUCH ULTRA TEST) test strip Patient tests once daily  100 each 3    Lancets (OneTouch Delica Plus [id number]) MISC Use 1 application daily 851 each 1    leuprolide (Eligard) 22 5 mg Inject 22 5 mg under the skin every 3 (three) months      metoprolol tartrate (LOPRESSOR) 25 mg tablet TAKE 2 TABLETS (50 MG) IN THE MORNING AND 1 TABLET IN THE EVENING 90 tablet 11    metoprolol tartrate (LOPRESSOR) 50 mg tablet TAKE 1 TABLET DAILY IN THE MORNING 90 tablet 3    Multiple Vitamins-Minerals (MULTIVITAMIN ADULT PO) Take 1 tablet by mouth every other day   neomycin-polymyxin-dexamethasone (MAXITROL) 0 35%-10,000 units/g-0 1% Administer 1 inch to both eyes in the morning       No current facility-administered medications for this visit  Allergies   Allergen Reactions    Penicillins Rash    Plavix [Clopidogrel] Rash      Immunizations:     Immunization History   Administered Date(s) Administered    COVID-19 MODERNA VACC 0 5 ML IM 01/19/2021, 02/16/2021, 10/21/2021    Influenza Split High Dose Preservative Free IM 12/04/2012, 12/13/2013, 10/01/2014, 09/24/2015, 11/09/2016, 11/02/2017    Influenza, high dose seasonal 0 7 mL 10/26/2018, 10/08/2019, 10/14/2020, 11/17/2021    Pneumococcal Conjugate 13-Valent 02/23/2016    Pneumococcal Polysaccharide PPV23 12/13/2011    Zoster 05/30/2013    Zoster Vaccine Recombinant 06/12/2019, 08/15/2019      Health Maintenance:         Topic Date Due    Colorectal Cancer Screening  04/26/2026     There are no preventive care reminders to display for this patient     Medicare Health Risk Assessment:     /64   Pulse 64   Temp (!) 95 5 °F (35 3 °C)   Resp 16   Ht 5' 10" (1 778 m)   Wt 73 5 kg (162 lb)   BMI 23 24 kg/m²      Paxton Smith is here for his Subsequent Wellness visit  Last Medicare Wellness visit information reviewed, patient interviewed and updates made to the record today  Health Risk Assessment:   Patient rates overall health as very good  Patient feels that their physical health rating is same  Patient is very satisfied with their life  Eyesight was rated as slightly worse  Hearing was rated as same  Patient feels that their emotional and mental health rating is same  Patients states they are never, rarely angry  Pain experienced in the last 7 days has been none  Patient states that he has experienced no weight loss or gain in last 6 months  Depression Screening:   PHQ-2 Score: 0      Fall Risk Screening: In the past year, patient has experienced: no history of falling in past year      Home Safety:  Patient does not have trouble with stairs inside or outside of their home  Patient has working smoke alarms and has working carbon monoxide detector  Home safety hazards include: none  Nutrition:   Current diet is Regular, No Added Salt, Low Carb and Limited junk food  Medications:   Patient is currently taking over-the-counter supplements  OTC medications include: see medication list  Patient is able to manage medications  Activities of Daily Living (ADLs)/Instrumental Activities of Daily Living (IADLs):   Walk and transfer into and out of bed and chair?: Yes  Dress and groom yourself?: Yes    Bathe or shower yourself?: Yes    Feed yourself?  Yes  Do your laundry/housekeeping?: Yes  Manage your money, pay your bills and track your expenses?: Yes  Make your own meals?: Yes    Do your own shopping?: Yes    Previous Hospitalizations:   Any hospitalizations or ED visits within the last 12 months?: No      Advance Care Planning:   Living will: Yes    Advanced directive: Yes      Cognitive Screening:   Provider or family/friend/caregiver concerned regarding cognition?: No    PREVENTIVE SCREENINGS Cardiovascular Screening:    General: History Lipid Disorder and Screening Current      Diabetes Screening:     General: Screening Not Indicated and History Diabetes      Colorectal Cancer Screening:     General: Screening Current      Prostate Cancer Screening:    General: History Prostate Cancer and Screening Not Indicated      Osteoporosis Screening:    General: Screening Not Indicated      Abdominal Aortic Aneurysm (AAA) Screening:        General: Screening Not Indicated      Lung Cancer Screening:     General: Screening Not Indicated      Hepatitis C Screening:    General: Screening Not Indicated    Screening, Brief Intervention, and Referral to Treatment (SBIRT)    Screening  Typical number of drinks in a day: 0  Typical number of drinks in a week: 0  Interpretation: Low risk drinking behavior  Single Item Drug Screening:  How often have you used an illegal drug (including marijuana) or a prescription medication for non-medical reasons in the past year? never    Single Item Drug Screen Score: 0  Interpretation: Negative screen for possible drug use disorder    Brief Intervention  Alcohol & drug use screenings were reviewed  No concerns regarding substance use disorder identified  Other Counseling Topics:   Skin self-exam, sunscreen and calcium and vitamin D intake and regular weightbearing exercise         Anson Severance, MD

## 2022-03-21 NOTE — PATIENT INSTRUCTIONS
Medicare Preventive Visit Patient Instructions  Thank you for completing your Welcome to Medicare Visit or Medicare Annual Wellness Visit today  Your next wellness visit will be due in one year (3/22/2023)  The screening/preventive services that you may require over the next 5-10 years are detailed below  Some tests may not apply to you based off risk factors and/or age  Screening tests ordered at today's visit but not completed yet may show as past due  Also, please note that scanned in results may not display below  Preventive Screenings:  Service Recommendations Previous Testing/Comments   Colorectal Cancer Screening  · Colonoscopy    · Fecal Occult Blood Test (FOBT)/Fecal Immunochemical Test (FIT)  · Fecal DNA/Cologuard Test  · Flexible Sigmoidoscopy Age: 54-65 years old   Colonoscopy: every 10 years (May be performed more frequently if at higher risk)  OR  FOBT/FIT: every 1 year  OR  Cologuard: every 3 years  OR  Sigmoidoscopy: every 5 years  Screening may be recommended earlier than age 48 if at higher risk for colorectal cancer  Also, an individualized decision between you and your healthcare provider will decide whether screening between the ages of 74-80 would be appropriate   Colonoscopy: 04/26/2021  FOBT/FIT: Not on file  Cologuard: Not on file  Sigmoidoscopy: Not on file    Screening Not Indicated     Prostate Cancer Screening Individualized decision between patient and health care provider in men between ages of 53-78   Medicare will cover every 12 months beginning on the day after your 50th birthday PSA: <0 1 ng/mL     History Prostate Cancer  Screening Not Indicated     Hepatitis C Screening Once for adults born between 1945 and 1965  More frequently in patients at high risk for Hepatitis C Hep C Antibody: Not on file        Diabetes Screening 1-2 times per year if you're at risk for diabetes or have pre-diabetes Fasting glucose: 108 mg/dL   A1C: 5 9 %    Screening Not Indicated  History Diabetes Cholesterol Screening Once every 5 years if you don't have a lipid disorder  May order more often based on risk factors  Lipid panel: 03/18/2022    Screening Not Indicated  History Lipid Disorder      Other Preventive Screenings Covered by Medicare:  1  Abdominal Aortic Aneurysm (AAA) Screening: covered once if your at risk  You're considered to be at risk if you have a family history of AAA or a male between the age of 73-68 who smoking at least 100 cigarettes in your lifetime  2  Lung Cancer Screening: covers low dose CT scan once per year if you meet all of the following conditions: (1) Age 50-69; (2) No signs or symptoms of lung cancer; (3) Current smoker or have quit smoking within the last 15 years; (4) You have a tobacco smoking history of at least 30 pack years (packs per day x number of years you smoked); (5) You get a written order from a healthcare provider  3  Glaucoma Screening: covered annually if you're considered high risk: (1) You have diabetes OR (2) Family history of glaucoma OR (3)  aged 48 and older OR (3)  American aged 72 and older  3  Osteoporosis Screening: covered every 2 years if you meet one of the following conditions: (1) Have a vertebral abnormality; (2) On glucocorticoid therapy for more than 3 months; (3) Have primary hyperparathyroidism; (4) On osteoporosis medications and need to assess response to drug therapy  5  HIV Screening: covered annually if you're between the age of 12-76  Also covered annually if you are younger than 13 and older than 72 with risk factors for HIV infection  For pregnant patients, it is covered up to 3 times per pregnancy      Immunizations:  Immunization Recommendations   Influenza Vaccine Annual influenza vaccination during flu season is recommended for all persons aged >= 6 months who do not have contraindications   Pneumococcal Vaccine (Prevnar and Pneumovax)  * Prevnar = PCV13  * Pneumovax = PPSV23 Adults 25-60 years old: 1-3 doses may be recommended based on certain risk factors  Adults 72 years old: Prevnar (PCV13) vaccine recommended followed by Pneumovax (PPSV23) vaccine  If already received PPSV23 since turning 65, then PCV13 recommended at least one year after PPSV23 dose  Hepatitis B Vaccine 3 dose series if at intermediate or high risk (ex: diabetes, end stage renal disease, liver disease)   Tetanus (Td) Vaccine - COST NOT COVERED BY MEDICARE PART B Following completion of primary series, a booster dose should be given every 10 years to maintain immunity against tetanus  Td may also be given as tetanus wound prophylaxis  Tdap Vaccine - COST NOT COVERED BY MEDICARE PART B Recommended at least once for all adults  For pregnant patients, recommended with each pregnancy  Shingles Vaccine (Shingrix) - COST NOT COVERED BY MEDICARE PART B  2 shot series recommended in those aged 48 and above     Health Maintenance Due:      Topic Date Due    Colorectal Cancer Screening  04/26/2026     Immunizations Due:      Topic Date Due    COVID-19 Vaccine (3 - Booster for Sheba Lucíaulich series) 07/16/2021     Advance Directives   What are advance directives? Advance directives are legal documents that state your wishes and plans for medical care  These plans are made ahead of time in case you lose your ability to make decisions for yourself  Advance directives can apply to any medical decision, such as the treatments you want, and if you want to donate organs  What are the types of advance directives? There are many types of advance directives, and each state has rules about how to use them  You may choose a combination of any of the following:  · Living will: This is a written record of the treatment you want  You can also choose which treatments you do not want, which to limit, and which to stop at a certain time  This includes surgery, medicine, IV fluid, and tube feedings  · Durable power of  for healthcare [facility]):   This is a written record that states who you want to make healthcare choices for you when you are unable to make them for yourself  This person, called a proxy, is usually a family member or a friend  You may choose more than 1 proxy  · Do not resuscitate (DNR) order:  A DNR order is used in case your heart stops beating or you stop breathing  It is a request not to have certain forms of treatment, such as CPR  A DNR order may be included in other types of advance directives  · Medical directive: This covers the care that you want if you are in a coma, near death, or unable to make decisions for yourself  You can list the treatments you want for each condition  Treatment may include pain medicine, surgery, blood transfusions, dialysis, IV or tube feedings, and a ventilator (breathing machine)  · Values history: This document has questions about your views, beliefs, and how you feel and think about life  This information can help others choose the care that you would choose  Why are advance directives important? An advance directive helps you control your care  Although spoken wishes may be used, it is better to have your wishes written down  Spoken wishes can be misunderstood, or not followed  Treatments may be given even if you do not want them  An advance directive may make it easier for your family to make difficult choices about your care  © Copyright [employer] 2018 Information is for End User's use only and may not be sold, redistributed or otherwise used for commercial purposes   All illustrations and images included in CareNotes® are the copyrighted property of [employer]  or [address] [place] [address] [facility]

## 2022-04-07 ENCOUNTER — REMOTE DEVICE CLINIC VISIT (OUTPATIENT)
Dept: CARDIOLOGY CLINIC | Facility: CLINIC | Age: 87
End: 2022-04-07
Payer: MEDICARE

## 2022-04-07 DIAGNOSIS — Z95.0 CARDIAC PACEMAKER IN SITU: Primary | ICD-10-CM

## 2022-04-07 PROCEDURE — 93294 REM INTERROG EVL PM/LDLS PM: CPT | Performed by: INTERNAL MEDICINE

## 2022-04-07 PROCEDURE — 93296 REM INTERROG EVL PM/IDS: CPT | Performed by: INTERNAL MEDICINE

## 2022-04-07 NOTE — PROGRESS NOTES
MDT DUAL PPM - NOT MRI CONDITIONAL   [employer] TRANSMISSION: BATTERY VOLTAGE ADEQUATE (7 YRS)  AP 97%  93% (>40%/AVB/AAIR-DDDR 60)  ALL AVAILABLE LEAD PARAMETERS WITHIN NORMAL LIMITS  NO SIGNIFICANT HIGH RATE EPISODES   PACEMAKER FUNCTIONING APPROPRIATELY   ES

## 2022-04-22 DIAGNOSIS — E11.9 TYPE 2 DIABETES MELLITUS WITHOUT COMPLICATION, WITHOUT LONG-TERM CURRENT USE OF INSULIN (HCC): ICD-10-CM

## 2022-04-22 RX ORDER — GLIMEPIRIDE 1 MG/1
1 TABLET ORAL DAILY
Qty: 90 TABLET | Refills: 3 | Status: SHIPPED | OUTPATIENT
Start: 2022-04-22

## 2022-04-26 ENCOUNTER — APPOINTMENT (OUTPATIENT)
Dept: LAB | Facility: CLINIC | Age: 87
End: 2022-04-26
Payer: MEDICARE

## 2022-04-26 DIAGNOSIS — C61 MALIGNANT NEOPLASM OF PROSTATE (HCC): ICD-10-CM

## 2022-04-26 LAB — PSA SERPL-MCNC: <0.1 NG/ML (ref 0–4)

## 2022-04-26 PROCEDURE — 84153 ASSAY OF PSA TOTAL: CPT

## 2022-06-13 ENCOUNTER — APPOINTMENT (OUTPATIENT)
Dept: LAB | Facility: CLINIC | Age: 87
End: 2022-06-13
Payer: MEDICARE

## 2022-06-13 LAB
CHOLEST SERPL-MCNC: 153 MG/DL
EST. AVERAGE GLUCOSE BLD GHB EST-MCNC: 126 MG/DL
HBA1C MFR BLD: 6 %
HDLC SERPL-MCNC: 43 MG/DL
LDLC SERPL CALC-MCNC: 92 MG/DL (ref 0–100)
NONHDLC SERPL-MCNC: 110 MG/DL
TRIGL SERPL-MCNC: 91 MG/DL

## 2022-06-13 PROCEDURE — 36415 COLL VENOUS BLD VENIPUNCTURE: CPT | Performed by: FAMILY MEDICINE

## 2022-06-13 PROCEDURE — 83036 HEMOGLOBIN GLYCOSYLATED A1C: CPT | Performed by: FAMILY MEDICINE

## 2022-06-13 PROCEDURE — 80061 LIPID PANEL: CPT | Performed by: FAMILY MEDICINE

## 2022-06-15 NOTE — PROGRESS NOTES
Cardiology Follow Up    Francesco Castañeda    1934  [id number]  [facility]  [address]  [address]  [phone]    1  Essential (primary) hypertension     2  Pure hypercholesterolemia     3  Coronary arteriosclerosis     4  Presence of cardiac pacemaker     5  Non-rheumatic mitral regurgitation         Interval History: Patient is here for a follow-up visit   Most recent Medtronic pacer PPM 4/2022 demonstrated an appropriately functioning device with no significant arrhythmia noted  He has CAD with PCI of the LAD   Echocardiogram done February 2018 demonstrated preserved LV systolic function and no significant valvular heart disease   Patient had a lipid profile done 6/2022 which demonstrated total cholesterol of 153 with an HDL of 43 and a calculated LDL of 92  He is on atorvastatin   He has prostate cancer and has ongoing follow-up with urology   As usual his wife is here today  He has had no chest pain or significant dyspnea      Patient Active Problem List   Diagnosis    Aortic valve regurgitation    Arteriosclerosis of coronary artery    Carpal tunnel syndrome    Disc degeneration, lumbar    Facet arthritis of lumbar region    Glaucoma    Hyperlipidemia    Hypertension    Lumbar canal stenosis    Mitral regurgitation    Prostate cancer (HCC)    Type 2 diabetes mellitus (HCC)    SSS (sick sinus syndrome) (HCC)     Past Medical History:   Diagnosis Date    Basal cell carcinoma     Benign polyp of large intestine     Osteoarthritis of left hip     last assessed [id number]    Piriformis syndrome of left side     last assessed [id number]     Social History     Socioeconomic History    Marital status: /Civil Union     Spouse name: Not on file    Number of children: Not on file    Years of education: Not on file    Highest education level: Not on file   Occupational History    Not on file   Tobacco Use    Smoking status: Never Smoker    Smokeless tobacco: Never Used   Substance and Sexual Activity    Alcohol use: No    Drug use: No    Sexual activity: Not Currently     Partners: Female     Birth control/protection: None   Other Topics Concern    Not on file   Social History Narrative    Not on file     Social Determinants of Health     Financial Resource Strain: Not on file   Food Insecurity: Not on file   Transportation Needs: Not on file   Physical Activity: Not on file   Stress: Not on file   Social Connections: Not on file   Intimate Partner Violence: Not on file   Housing Stability: Not on file      Family History   Problem Relation Age of Onset    Coronary artery disease Mother     Diabetes Maternal Grandmother      Past Surgical History:   Procedure Laterality Date    CARDIAC PACEMAKER PLACEMENT  05/01/2005    CATARACT EXTRACTION Left 10/01/1995    CATARACT EXTRACTION Right 02/01/2000    CORONARY ANGIOPLASTY  01/01/2010 2010    CORONARY ANGIOPLASTY WITH STENT PLACEMENT  11/01/2002    INGUINAL HERNIA REPAIR Right 01/01/1998    INGUINAL HERNIA REPAIR Left 01/01/2009 2009    LUMBAR LAMINECTOMY  01/01/2002 2002    REPLACEMENT TOTAL KNEE Right 06/01/2010    right total knee replacement with complications    RHIZOTOMY      TONSILLECTOMY AND ADENOIDECTOMY  01/01/1937    1937    TOTAL HIP ARTHROPLASTY Left 01/18/2017       Current Outpatient Medications:     aspirin 81 MG tablet, Take 1 tablet by mouth daily Except sun , Disp: , Rfl:     atorvastatin (LIPITOR) 10 mg tablet, TAKE 1 TABLET DAILY, Disp: 90 tablet, Rfl: 3    cephalexin (KEFLEX) 500 mg capsule, For dental visits, Disp: , Rfl: 0    glimepiride (AMARYL) 1 mg tablet, Take 1 tablet (1 mg total) by mouth daily, Disp: 90 tablet, Rfl: 3    leuprolide (Eligard) 22 5 mg, Inject 22 5 mg under the skin every 3 (three) months, Disp: , Rfl:     metoprolol tartrate (LOPRESSOR) 25 mg tablet, TAKE 2 TABLETS (50 MG) IN THE MORNING AND 1 TABLET IN THE EVENING, Disp: 90 tablet, Rfl: 11    metoprolol tartrate (LOPRESSOR) 50 mg tablet, TAKE 1 TABLET DAILY IN THE MORNING, Disp: 90 tablet, Rfl: 3    atorvastatin (LIPITOR) 10 mg tablet, TAKE 1 TABLET DAILY, Disp: 90 tablet, Rfl: 3    glucose blood (ONE TOUCH ULTRA TEST) test strip, Patient tests once daily  (Patient not taking: Reported on 6/23/2022), Disp: 100 each, Rfl: 3    Lancets (OneTouch Delica Plus [id number]) MISC, Use 1 application daily (Patient not taking: Reported on 6/23/2022), Disp: 100 each, Rfl: 1    Multiple Vitamins-Minerals (MULTIVITAMIN ADULT PO), Take 1 tablet by mouth every other day , Disp: , Rfl:     neomycin-polymyxin-dexamethasone (MAXITROL) 0 35%-10,000 units/g-0 1%, Administer 1 inch to both eyes in the morning (Patient not taking: Reported on 6/23/2022), Disp: , Rfl:   Allergies   Allergen Reactions    Penicillins Rash    Plavix [Clopidogrel] Rash       Labs:not applicable  Imaging: No results found  Review of Systems:  Review of Systems   All other systems reviewed and are negative  Physical Exam:  /92 (BP Location: Right arm, Patient Position: Sitting, Cuff Size: Standard)   Pulse 77   Wt 73 5 kg (162 lb 1 6 oz)   SpO2 97%   BMI 23 26 kg/m²   Physical Exam  Vitals reviewed  Constitutional:       Appearance: He is well-developed  HENT:      Head: Normocephalic and atraumatic  Eyes:      Conjunctiva/sclera: Conjunctivae normal       Pupils: Pupils are equal, round, and reactive to light  Cardiovascular:      Rate and Rhythm: Normal rate  Heart sounds: Normal heart sounds  Pulmonary:      Effort: Pulmonary effort is normal       Breath sounds: Normal breath sounds  Musculoskeletal:      Cervical back: Normal range of motion and neck supple  Skin:     General: Skin is warm and dry  Neurological:      Mental Status: He is alert and oriented to person, place, and time           Discussion/Summary:I will continue the patient's present medical regimen  The patient appears well compensated  I have asked the patient to call if there is a problem in the interim otherwise I will see the patient in six months time

## 2022-06-23 ENCOUNTER — OFFICE VISIT (OUTPATIENT)
Dept: CARDIOLOGY CLINIC | Facility: CLINIC | Age: 87
End: 2022-06-23
Payer: MEDICARE

## 2022-06-23 VITALS
BODY MASS INDEX: 23.26 KG/M2 | SYSTOLIC BLOOD PRESSURE: 142 MMHG | OXYGEN SATURATION: 97 % | HEART RATE: 77 BPM | WEIGHT: 162.1 LBS | DIASTOLIC BLOOD PRESSURE: 92 MMHG

## 2022-06-23 DIAGNOSIS — I25.10 CORONARY ARTERIOSCLEROSIS: ICD-10-CM

## 2022-06-23 DIAGNOSIS — I34.0 NON-RHEUMATIC MITRAL REGURGITATION: ICD-10-CM

## 2022-06-23 DIAGNOSIS — I10 ESSENTIAL (PRIMARY) HYPERTENSION: Primary | ICD-10-CM

## 2022-06-23 DIAGNOSIS — E78.00 PURE HYPERCHOLESTEROLEMIA: ICD-10-CM

## 2022-06-23 DIAGNOSIS — Z95.0 PRESENCE OF CARDIAC PACEMAKER: ICD-10-CM

## 2022-06-23 PROCEDURE — 99214 OFFICE O/P EST MOD 30 MIN: CPT | Performed by: INTERNAL MEDICINE

## 2022-07-12 ENCOUNTER — IN-CLINIC DEVICE VISIT (OUTPATIENT)
Dept: CARDIOLOGY CLINIC | Facility: MEDICAL CENTER | Age: 87
End: 2022-07-12
Payer: MEDICARE

## 2022-07-12 DIAGNOSIS — Z95.0 PRESENCE OF PERMANENT CARDIAC PACEMAKER: Primary | ICD-10-CM

## 2022-07-12 PROCEDURE — 93280 PM DEVICE PROGR EVAL DUAL: CPT | Performed by: INTERNAL MEDICINE

## 2022-07-12 NOTE — PROGRESS NOTES
MDT DUAL PPM - NOT MRI CONDITIONAL   DEVICE INTERROGATED IN THE [facility] [employer] OFFICE:  BATTERY VOLTAGE ADEQUATE (7 YR )   AP 96 9%  92 1% (>40%/AVB/DEPENDENT)   ALL AVAILABLE LEAD PARAMETERS WITHIN NORMAL LIMITS   NO SIGNIFICANT HIGH RATE EPISODES   NO PROGRAMMING CHANGES MADE TO DEVICE PARAMETERS   NORMAL DEVICE FUNCTION   RG

## 2022-08-04 ENCOUNTER — APPOINTMENT (OUTPATIENT)
Dept: LAB | Facility: CLINIC | Age: 87
End: 2022-08-04
Payer: MEDICARE

## 2022-08-04 DIAGNOSIS — C61 MALIGNANT NEOPLASM OF PROSTATE (HCC): ICD-10-CM

## 2022-08-04 LAB
ALBUMIN SERPL BCP-MCNC: 3.9 G/DL (ref 3.5–5)
ALP SERPL-CCNC: 61 U/L (ref 46–116)
ALT SERPL W P-5'-P-CCNC: 25 U/L (ref 12–78)
AST SERPL W P-5'-P-CCNC: 19 U/L (ref 5–45)
BILIRUB DIRECT SERPL-MCNC: 0.15 MG/DL (ref 0–0.2)
BILIRUB SERPL-MCNC: 0.53 MG/DL (ref 0.2–1)
BUN SERPL-MCNC: 27 MG/DL (ref 5–25)
CALCIUM SERPL-MCNC: 9.8 MG/DL (ref 8.3–10.1)
CREAT SERPL-MCNC: 1.06 MG/DL (ref 0.6–1.3)
ERYTHROCYTE [DISTWIDTH] IN BLOOD BY AUTOMATED COUNT: 13 % (ref 11.6–15.1)
GFR SERPL CREATININE-BSD FRML MDRD: 62 ML/MIN/1.73SQ M
HCT VFR BLD AUTO: 39.4 % (ref 36.5–49.3)
HGB BLD-MCNC: 12.4 G/DL (ref 12–17)
MCH RBC QN AUTO: 30 PG (ref 26.8–34.3)
MCHC RBC AUTO-ENTMCNC: 31.5 G/DL (ref 31.4–37.4)
MCV RBC AUTO: 95 FL (ref 82–98)
PLATELET # BLD AUTO: 193 THOUSANDS/UL (ref 149–390)
PMV BLD AUTO: 11.5 FL (ref 8.9–12.7)
PROT SERPL-MCNC: 7.3 G/DL (ref 6.4–8.4)
PSA SERPL-MCNC: <0.1 NG/ML (ref 0–4)
RBC # BLD AUTO: 4.13 MILLION/UL (ref 3.88–5.62)
TESTOST SERPL-MCNC: 13 NG/DL (ref 95–948)
WBC # BLD AUTO: 5.01 THOUSAND/UL (ref 4.31–10.16)

## 2022-08-04 PROCEDURE — 82310 ASSAY OF CALCIUM: CPT

## 2022-08-04 PROCEDURE — 36415 COLL VENOUS BLD VENIPUNCTURE: CPT

## 2022-08-04 PROCEDURE — 85027 COMPLETE CBC AUTOMATED: CPT

## 2022-08-04 PROCEDURE — 84403 ASSAY OF TOTAL TESTOSTERONE: CPT

## 2022-08-04 PROCEDURE — 82565 ASSAY OF CREATININE: CPT

## 2022-08-04 PROCEDURE — 84520 ASSAY OF UREA NITROGEN: CPT

## 2022-08-04 PROCEDURE — 80076 HEPATIC FUNCTION PANEL: CPT

## 2022-08-04 PROCEDURE — 84153 ASSAY OF PSA TOTAL: CPT

## 2022-09-20 ENCOUNTER — RA CDI HCC (OUTPATIENT)
Dept: OTHER | Facility: HOSPITAL | Age: 87
End: 2022-09-20

## 2022-09-26 ENCOUNTER — OFFICE VISIT (OUTPATIENT)
Dept: FAMILY MEDICINE CLINIC | Facility: CLINIC | Age: 87
End: 2022-09-26
Payer: MEDICARE

## 2022-09-26 VITALS
WEIGHT: 166 LBS | HEART RATE: 62 BPM | DIASTOLIC BLOOD PRESSURE: 64 MMHG | HEIGHT: 70 IN | TEMPERATURE: 97.2 F | BODY MASS INDEX: 23.77 KG/M2 | OXYGEN SATURATION: 98 % | SYSTOLIC BLOOD PRESSURE: 118 MMHG | RESPIRATION RATE: 16 BRPM

## 2022-09-26 DIAGNOSIS — I34.0 NONRHEUMATIC MITRAL VALVE REGURGITATION: ICD-10-CM

## 2022-09-26 DIAGNOSIS — I10 PRIMARY HYPERTENSION: ICD-10-CM

## 2022-09-26 DIAGNOSIS — I35.1 NONRHEUMATIC AORTIC VALVE INSUFFICIENCY: ICD-10-CM

## 2022-09-26 DIAGNOSIS — Z23 ENCOUNTER FOR IMMUNIZATION: ICD-10-CM

## 2022-09-26 DIAGNOSIS — I25.10 ARTERIOSCLEROSIS OF CORONARY ARTERY: ICD-10-CM

## 2022-09-26 DIAGNOSIS — I49.5 SSS (SICK SINUS SYNDROME) (HCC): ICD-10-CM

## 2022-09-26 DIAGNOSIS — E11.9 TYPE 2 DIABETES MELLITUS WITHOUT COMPLICATION, WITHOUT LONG-TERM CURRENT USE OF INSULIN (HCC): Primary | ICD-10-CM

## 2022-09-26 DIAGNOSIS — C61 PROSTATE CANCER (HCC): ICD-10-CM

## 2022-09-26 DIAGNOSIS — E78.00 PURE HYPERCHOLESTEROLEMIA: ICD-10-CM

## 2022-09-26 PROCEDURE — G0008 ADMIN INFLUENZA VIRUS VAC: HCPCS

## 2022-09-26 PROCEDURE — 90662 IIV NO PRSV INCREASED AG IM: CPT

## 2022-09-26 PROCEDURE — 99214 OFFICE O/P EST MOD 30 MIN: CPT | Performed by: FAMILY MEDICINE

## 2022-09-26 NOTE — PROGRESS NOTES
Name: Williams Ladd        : 1934      MRN: [id number]  Encounter Provider: Krysta Ray MD  Encounter Date: 2022   Encounter department: [address] [place] [address]     1  Type 2 diabetes mellitus without complication, without long-term current use of insulin (HCC)  -     Hemoglobin A1C  -     Microalbumin / creatinine urine ratio    2  Primary hypertension  -     Comprehensive metabolic panel  -     CBC and differential    3  Pure hypercholesterolemia  -     Lipid panel    4  Arteriosclerosis of coronary artery    5  Nonrheumatic aortic valve insufficiency    6  Nonrheumatic mitral valve regurgitation    7  SSS (sick sinus syndrome) ([facility] [address] )    8  Prostate cancer ([facility] )    9  Encounter for immunization  -     influenza vaccine, high-dose, PF 0 7 mL (FLUZONE HIGH-DOSE)      Continue with current medications  Patient was provided a new glucometer  Contour Next EZ  Monitor for hypoglycemia  OV 6 months w/ labs  Flu vaccine today  Fully vaccinated for COV 19 including new bivalent booster  Subjective     Follow up visit  Medications reviewed Type 2 diabetes mellitus  On Glimepiride 1 mg daily  2022 A1c 6 0  2022  Urine microalbuminin 6 1 Not on ACE or ARB  No hypoglycemic events  No neuropathy symptoms  He is followed by Podiatry  Current with eye exam  Hypertension blood pressures have been stable on Metoprolol tartrate 50 mg in AM and 25 mg in PM  2022 Creatinine 1 06  GFR 62  Hemoglobin 12 4  2022 Electrolytes normal    Hyperlipidemia and CAD on Atorvastatin 10 mg daily lipid profile  2022 cholesterol 153  HDL 43  Triglycerides 91  LDL 92  2022 LFTs normal   2019 TSH 1 78       Lab Results   Component Value Date    HGBA1C 6 0 (H) 2022     Lab Results   Component Value Date    SODIUM 139 2021    K 4 3 2021     2021    CO2 30 2021    BUN 27 (H) 2022    CREATININE 1 06 2022    GLUC 137 2016    CALCIUM 9 8 08/04/2022     Lab Results   Component Value Date    WBC 5 01 08/04/2022    HGB 12 4 08/04/2022    HCT 39 4 08/04/2022    MCV 95 08/04/2022     08/04/2022     Lab Results   Component Value Date    CHOLESTEROL 153 06/13/2022    CHOLESTEROL 160 03/18/2022    CHOLESTEROL 156 03/25/2021     Lab Results   Component Value Date    HDL 43 06/13/2022    HDL 47 03/18/2022    HDL 46 03/25/2021     Lab Results   Component Value Date    TRIG 91 06/13/2022    TRIG 68 03/18/2022    TRIG 113 03/25/2021     Lab Results   Component Value Date    LDLCALC 92 06/13/2022     Lab Results   Component Value Date    ALT 25 08/04/2022    AST 19 08/04/2022    ALKPHOS 61 08/04/2022    BILITOT 0 87 12/22/2015     Lab Results   Component Value Date    [id number] 1 780 03/08/2019           Review of Systems   Constitutional: Negative for appetite change, chills, fever and unexpected weight change  HENT: Negative for congestion, ear pain, rhinorrhea, sore throat and trouble swallowing  Eyes: Negative for visual disturbance  Respiratory: Negative for cough, shortness of breath and wheezing  Cardiovascular: Negative for chest pain, palpitations and leg swelling  CAD s/p stents  S/p pacemaker  02/2018  Echocardiogram normal left ventricular systolic function  EF 55%  No regional wall motion abnormalities  Grade 1 diastolic dysfunction  mild mitral regurgitation  Mild aortic regurgitation  Trace TR/UT  No pericardial effusion  Aortic root normal size  Gastrointestinal: Negative for abdominal pain, blood in stool, constipation, diarrhea, nausea and vomiting  Colonoscopy 04/2016   Endocrine: Negative for polydipsia and polyuria  Genitourinary: Negative for difficulty urinating  Prostate CA s/p XRT 2006  He is followed by Urology    He has been receiving ADT injections for elevated PSA     Lab Results       Component                Value               Date                       PSA <0 1                08/04/2022                 PSA                      <0 1                04/26/2022                 PSA                      <0 1                01/12/2022                                  Musculoskeletal: Negative for arthralgias, gait problem and myalgias  OA S/p left THR 01/2017  Skin: Negative for rash  History of BCCs/SCCs followed by Dermatology   Allergic/Immunologic: Negative for environmental allergies  Neurological: Negative for dizziness and headaches  Hematological: Negative for adenopathy  Does not bruise/bleed easily  Psychiatric/Behavioral: Negative for dysphoric mood and sleep disturbance         Past Medical History:   Diagnosis Date    Basal cell carcinoma     Benign polyp of large intestine     Osteoarthritis of left hip     last assessed [id number]    Piriformis syndrome of left side     last assessed [id number]     Past Surgical History:   Procedure Laterality Date    CARDIAC PACEMAKER PLACEMENT  05/01/2005    CATARACT EXTRACTION Left 10/01/1995    CATARACT EXTRACTION Right 02/01/2000    CORONARY ANGIOPLASTY  01/01/2010 2010    CORONARY ANGIOPLASTY WITH STENT PLACEMENT  11/01/2002    INGUINAL HERNIA REPAIR Right 01/01/1998    INGUINAL HERNIA REPAIR Left 01/01/2009    2009    LUMBAR LAMINECTOMY  01/01/2002 2002    REPLACEMENT TOTAL KNEE Right 06/01/2010    right total knee replacement with complications    RHIZOTOMY      TONSILLECTOMY AND ADENOIDECTOMY  01/01/1937    1937    TOTAL HIP ARTHROPLASTY Left 01/18/2017     Family History   Problem Relation Age of Onset    Coronary artery disease Mother     Diabetes Maternal Grandmother      Social History     Socioeconomic History    Marital status: /Civil Union     Spouse name: None    Number of children: None    Years of education: None    Highest education level: None   Occupational History    None   Tobacco Use    Smoking status: Never Smoker    Smokeless tobacco: Never Used   Substance and Sexual Activity    Alcohol use: No    Drug use: No    Sexual activity: Not Currently     Partners: Female     Birth control/protection: None   Other Topics Concern    None   Social History Narrative    None     Social Determinants of Health     Financial Resource Strain: Not on file   Food Insecurity: Not on file   Transportation Needs: Not on file   Physical Activity: Not on file   Stress: Not on file   Social Connections: Not on file   Intimate Partner Violence: Not on file   Housing Stability: Not on file     Current Outpatient Medications on File Prior to Visit   Medication Sig    aspirin 81 MG tablet Take 1 tablet by mouth daily Except sun     atorvastatin (LIPITOR) 10 mg tablet TAKE 1 TABLET DAILY    cephalexin (KEFLEX) 500 mg capsule For dental visits    glimepiride (AMARYL) 1 mg tablet Take 1 tablet (1 mg total) by mouth daily    leuprolide (Eligard) 22 5 mg Inject 22 5 mg under the skin every 3 (three) months    metoprolol tartrate (LOPRESSOR) 25 mg tablet TAKE 2 TABLETS (50 MG) IN THE MORNING AND 1 TABLET IN THE EVENING (Patient taking differently: Take 25 mg by mouth daily)    metoprolol tartrate (LOPRESSOR) 50 mg tablet TAKE 1 TABLET DAILY IN THE MORNING    Multiple Vitamins-Minerals (MULTIVITAMIN ADULT PO) Take 1 tablet by mouth every other day   glucose blood (ONE TOUCH ULTRA TEST) test strip Patient tests once daily   (Patient not taking: Reported on 6/23/2022)    Lancets (OneTouch Delica Plus [id number]) MISC Use 1 application daily (Patient not taking: Reported on 6/23/2022)    [DISCONTINUED] neomycin-polymyxin-dexamethasone (MAXITROL) 0 35%-10,000 units/g-0 1% Administer 1 inch to both eyes in the morning (Patient not taking: Reported on 6/23/2022)     Allergies   Allergen Reactions    Penicillins Rash    Plavix [Clopidogrel] Rash     Immunization History   Administered Date(s) Administered    COVID-19 MODERNA VACC 0 5 ML IM 01/19/2021, 02/16/2021, 10/21/2021  COVID-19 Moderna Vac BIVALENT 18 Yr+ Im (BOOSTER ONLY) 09/10/2022    COVID-19 Moderna vac 6-11y or adult booster 50 mcg/0 5 mL 04/13/2022    Influenza Split High Dose Preservative Free IM 12/04/2012, 12/13/2013, 10/01/2014, 09/24/2015, 11/09/2016, 11/02/2017    Influenza, high dose seasonal 0 7 mL 10/26/2018, 10/08/2019, 10/14/2020, 11/17/2021, 09/26/2022    Pneumococcal Conjugate 13-Valent 02/23/2016    Pneumococcal Polysaccharide PPV23 12/13/2011    Zoster 05/30/2013    Zoster Vaccine Recombinant 06/12/2019, 08/15/2019       Objective     /64   Pulse 62   Temp (!) 97 2 °F (36 2 °C)   Resp 16   Ht 5' 10" (1 778 m)   Wt 75 3 kg (166 lb)   SpO2 98%   BMI 23 82 kg/m²     BP Readings from Last 3 Encounters:   09/26/22 118/64   06/23/22 142/92   03/21/22 132/64     Wt Readings from Last 3 Encounters:   09/26/22 75 3 kg (166 lb)   06/23/22 73 5 kg (162 lb 1 6 oz)   03/21/22 73 5 kg (162 lb)         Physical Exam  Vitals and nursing note reviewed  Constitutional:       General: He is not in acute distress  Appearance: He is well-developed  HENT:      Right Ear: Tympanic membrane and ear canal normal       Left Ear: Tympanic membrane and ear canal normal    Eyes:      General: No scleral icterus  Extraocular Movements: Extraocular movements intact  Conjunctiva/sclera: Conjunctivae normal       Pupils: Pupils are equal, round, and reactive to light  Neck:      Thyroid: No thyroid mass or thyromegaly  Vascular: No carotid bruit or JVD  Trachea: No tracheal deviation  Cardiovascular:      Rate and Rhythm: Normal rate and regular rhythm  Pulses:           Carotid pulses are 2+ on the right side and 2+ on the left side  Heart sounds: Normal heart sounds  No murmur heard  No gallop  Pulmonary:      Effort: Pulmonary effort is normal  No respiratory distress  Breath sounds: Normal breath sounds  No wheezing or rales     Chest:   Breasts:      Right: No supraclavicular adenopathy  Left: No supraclavicular adenopathy  Musculoskeletal:      Right lower leg: No edema  Left lower leg: No edema  Lymphadenopathy:      Cervical: No cervical adenopathy  Upper Body:      Right upper body: No supraclavicular adenopathy  Left upper body: No supraclavicular adenopathy  Skin:     Findings: No rash  Nails: There is no clubbing  Neurological:      General: No focal deficit present  Mental Status: He is alert and oriented to person, place, and time     Psychiatric:         Mood and Affect: Mood normal          Behavior: Behavior normal        Rudi Jang MD

## 2022-09-27 ENCOUNTER — HOSPITAL ENCOUNTER (EMERGENCY)
Facility: HOSPITAL | Age: 87
Discharge: HOME | End: 2022-09-27
Attending: EMERGENCY MEDICINE
Payer: MEDICARE

## 2022-09-27 ENCOUNTER — TELEPHONE (OUTPATIENT)
Dept: FAMILY MEDICINE CLINIC | Facility: CLINIC | Age: 87
End: 2022-09-27

## 2022-09-27 ENCOUNTER — APPOINTMENT (OUTPATIENT)
Dept: RADIOLOGY | Facility: HOSPITAL | Age: 87
End: 2022-09-27
Payer: MEDICARE

## 2022-09-27 VITALS
TEMPERATURE: 98.4 F | DIASTOLIC BLOOD PRESSURE: 60 MMHG | OXYGEN SATURATION: 99 % | SYSTOLIC BLOOD PRESSURE: 129 MMHG | HEART RATE: 62 BPM | RESPIRATION RATE: 22 BRPM

## 2022-09-27 DIAGNOSIS — E78.00 PURE HYPERCHOLESTEROLEMIA: ICD-10-CM

## 2022-09-27 DIAGNOSIS — R53.1 GENERALIZED WEAKNESS: ICD-10-CM

## 2022-09-27 DIAGNOSIS — U07.1 COVID-19: Primary | ICD-10-CM

## 2022-09-27 LAB
2HR DELTA HS TROPONIN: 1 NG/L
ALBUMIN SERPL BCP-MCNC: 3.9 G/DL (ref 3.5–5)
ALP SERPL-CCNC: 49 U/L (ref 34–104)
ALT SERPL W P-5'-P-CCNC: 14 U/L (ref 7–52)
ANION GAP SERPL CALCULATED.3IONS-SCNC: 5 MMOL/L (ref 4–13)
AST SERPL W P-5'-P-CCNC: 21 U/L (ref 13–39)
BASOPHILS # BLD AUTO: 0.02 THOUSANDS/ΜL (ref 0–0.1)
BASOPHILS NFR BLD AUTO: 0 % (ref 0–1)
BILIRUB SERPL-MCNC: 0.63 MG/DL (ref 0.2–1)
BNP SERPL-MCNC: 247 PG/ML (ref 0–100)
BUN SERPL-MCNC: 23 MG/DL (ref 5–25)
CALCIUM SERPL-MCNC: 9 MG/DL (ref 8.4–10.2)
CARDIAC TROPONIN I PNL SERPL HS: 8 NG/L
CARDIAC TROPONIN I PNL SERPL HS: 9 NG/L
CHLORIDE SERPL-SCNC: 105 MMOL/L (ref 96–108)
CO2 SERPL-SCNC: 28 MMOL/L (ref 21–32)
CREAT SERPL-MCNC: 1.14 MG/DL (ref 0.6–1.3)
D DIMER PPP FEU-MCNC: 0.98 UG/ML FEU
EOSINOPHIL # BLD AUTO: 0.01 THOUSAND/ΜL (ref 0–0.61)
EOSINOPHIL NFR BLD AUTO: 0 % (ref 0–6)
ERYTHROCYTE [DISTWIDTH] IN BLOOD BY AUTOMATED COUNT: 13.5 % (ref 11.6–15.1)
GFR SERPL CREATININE-BSD FRML MDRD: 57 ML/MIN/1.73SQ M
GLUCOSE SERPL-MCNC: 92 MG/DL (ref 65–140)
HCT VFR BLD AUTO: 35.3 % (ref 36.5–49.3)
HGB BLD-MCNC: 11.2 G/DL (ref 12–17)
IMM GRANULOCYTES # BLD AUTO: 0.01 THOUSAND/UL (ref 0–0.2)
IMM GRANULOCYTES NFR BLD AUTO: 0 % (ref 0–2)
LYMPHOCYTES # BLD AUTO: 0.46 THOUSANDS/ΜL (ref 0.6–4.47)
LYMPHOCYTES NFR BLD AUTO: 9 % (ref 14–44)
MCH RBC QN AUTO: 30.6 PG (ref 26.8–34.3)
MCHC RBC AUTO-ENTMCNC: 31.7 G/DL (ref 31.4–37.4)
MCV RBC AUTO: 96 FL (ref 82–98)
MONOCYTES # BLD AUTO: 0.84 THOUSAND/ΜL (ref 0.17–1.22)
MONOCYTES NFR BLD AUTO: 16 % (ref 4–12)
NEUTROPHILS # BLD AUTO: 4.07 THOUSANDS/ΜL (ref 1.85–7.62)
NEUTS SEG NFR BLD AUTO: 75 % (ref 43–75)
NRBC BLD AUTO-RTO: 0 /100 WBCS
PLATELET # BLD AUTO: 160 THOUSANDS/UL (ref 149–390)
PMV BLD AUTO: 10.4 FL (ref 8.9–12.7)
POTASSIUM SERPL-SCNC: 4 MMOL/L (ref 3.5–5.3)
PROT SERPL-MCNC: 6.6 G/DL (ref 6.4–8.4)
RBC # BLD AUTO: 3.66 MILLION/UL (ref 3.88–5.62)
SODIUM SERPL-SCNC: 138 MMOL/L (ref 135–147)
WBC # BLD AUTO: 5.41 THOUSAND/UL (ref 4.31–10.16)

## 2022-09-27 PROCEDURE — 83880 ASSAY OF NATRIURETIC PEPTIDE: CPT | Performed by: EMERGENCY MEDICINE

## 2022-09-27 PROCEDURE — 99285 EMERGENCY DEPT VISIT HI MDM: CPT

## 2022-09-27 PROCEDURE — 85379 FIBRIN DEGRADATION QUANT: CPT | Performed by: EMERGENCY MEDICINE

## 2022-09-27 PROCEDURE — 84484 ASSAY OF TROPONIN QUANT: CPT | Performed by: EMERGENCY MEDICINE

## 2022-09-27 PROCEDURE — 36415 COLL VENOUS BLD VENIPUNCTURE: CPT

## 2022-09-27 PROCEDURE — 99285 EMERGENCY DEPT VISIT HI MDM: CPT | Performed by: EMERGENCY MEDICINE

## 2022-09-27 PROCEDURE — 85025 COMPLETE CBC W/AUTO DIFF WBC: CPT | Performed by: EMERGENCY MEDICINE

## 2022-09-27 PROCEDURE — 71045 X-RAY EXAM CHEST 1 VIEW: CPT

## 2022-09-27 PROCEDURE — 80053 COMPREHEN METABOLIC PANEL: CPT | Performed by: EMERGENCY MEDICINE

## 2022-09-27 PROCEDURE — 87040 BLOOD CULTURE FOR BACTERIA: CPT | Performed by: EMERGENCY MEDICINE

## 2022-09-27 PROCEDURE — 93005 ELECTROCARDIOGRAM TRACING: CPT

## 2022-09-27 RX ORDER — ATORVASTATIN CALCIUM 10 MG/1
TABLET, FILM COATED ORAL
Qty: 90 TABLET | Refills: 3 | Status: SHIPPED | OUTPATIENT
Start: 2022-09-27

## 2022-09-27 RX ORDER — NIRMATRELVIR AND RITONAVIR 150-100 MG
2 KIT ORAL 2 TIMES DAILY
Qty: 20 TABLET | Refills: 0 | Status: SHIPPED | OUTPATIENT
Start: 2022-09-27 | End: 2022-10-02

## 2022-09-27 NOTE — TELEPHONE ENCOUNTER
Call I would recommend Paxlovid  We can try to go a telemedicine in the next 24 to 48 hours if they have the technology to do so

## 2022-09-27 NOTE — TELEPHONE ENCOUNTER
Called to find out if they were able to do the swab  Ryder Da Silva reports patient was sleeping for the past 1 5 hours  He woke as we were speaking  She will swab him and call back

## 2022-09-27 NOTE — TELEPHONE ENCOUNTER
Patient is going to [facility], son in law is there and is a paramedic  Patient is very dehydrated, he cannot get a pulse ox on him  Blood pressure is 96/48

## 2022-09-27 NOTE — TELEPHONE ENCOUNTER
Pt is covid positive  Temp is 99 1 now with Tylenol    Please advise if you yara like pt seen virtually

## 2022-09-27 NOTE — TELEPHONE ENCOUNTER
Last evening patient had fever of 101 2 at 9pm     This am he went to the bathroom around 530 his knees buckled and was to weak to get up  They called 911 and EMS got him into bed, took vitals and a tested to make sure he was not having a stroke  Patient did not want to be taken to hospital     Right now he is working on his computer in sweaters and blankets because he is freezing but fever is back to normal with tylenol  They just got back from a cruise with friends who called last night to say he was covid positive  The home tests they have exipred in July but son will drop some by, they will test and call us with results        Please advise

## 2022-09-28 ENCOUNTER — TELEPHONE (OUTPATIENT)
Dept: FAMILY MEDICINE CLINIC | Facility: CLINIC | Age: 87
End: 2022-09-28

## 2022-09-28 NOTE — TELEPHONE ENCOUNTER
Spoke to patient's wife, states that patient is feeling better, currently outside picking up sticks , so son may cut grass  no fever, no breathing difficulties  Is tolerating  Paxlovid with out problems thus far

## 2022-09-28 NOTE — DISCHARGE INSTRUCTIONS
Stop taking your statin while taking Paxlovid  Please follow up with Dr Worthy Printers this week  Have a very low threshold to return to the ER for worsening symptoms  Please also purchase a home pulse oximiter to monitor your oxygen saturations  If lower than 92% please come back to the ER  If you have any chest pain or shortness of breath please come back to the ER  We are always here and always happy to re-evaluate!

## 2022-09-29 LAB
ATRIAL RATE: 72 BPM
P AXIS: 33 DEGREES
PR INTERVAL: 168 MS
QRS AXIS: -79 DEGREES
QRSD INTERVAL: 172 MS
QT INTERVAL: 452 MS
QTC INTERVAL: 494 MS
T WAVE AXIS: 84 DEGREES
VENTRICULAR RATE: 72 BPM

## 2022-09-29 PROCEDURE — 93010 ELECTROCARDIOGRAM REPORT: CPT | Performed by: INTERNAL MEDICINE

## 2022-10-02 LAB
BACTERIA BLD CULT: NORMAL
BACTERIA BLD CULT: NORMAL

## 2022-10-15 NOTE — ED PROVIDER NOTES
History  Chief Complaint   Patient presents with   • Weakness - Generalized     Pt tested positive for Covid yesterday  Also had flu shot yesterday  Felt very weak yesterday  Weakness improved but wants eval, pcp feels he is dehydrated and wants fluid  Denies vomiting, had fever last night of 101, took tylenol around 1030 this morning     79 yo male with h/o basal cell CA, OA of left hip, HTN, HLD, DM p/w fatigue and concern for dehydration after testing positive for COVID yesterday  Pt reports fever 101 yesterday  He also received flu shot yesterday as well  Denies cp, sob, LE pain/swelling, rash, URI symptoms  Pt recently went on a cruise that left from [place]  No recent immobilization, long flights or immobility  No h/o DVT/PE or familial h/o of such  PT denies dizziness, feeling faint, or episodes of sycnope/near syncope  No n/v/d  History provided by:  Medical records and patient   used: No        Prior to Admission Medications   Prescriptions Last Dose Informant Patient Reported? Taking? Lancets (OneTouch Delica Plus [id number]) MISC  Self No No   Sig: Use 1 application daily   Patient not taking: Reported on 6/23/2022   Multiple Vitamins-Minerals (MULTIVITAMIN ADULT PO)  Self Yes No   Sig: Take 1 tablet by mouth every other day  aspirin 81 MG tablet  Self Yes No   Sig: Take 1 tablet by mouth daily Except sun    atorvastatin (LIPITOR) 10 mg tablet   No No   Sig: TAKE 1 TABLET DAILY   cephalexin (KEFLEX) 500 mg capsule  Self Yes No   Sig: For dental visits   glimepiride (AMARYL) 1 mg tablet  Self No No   Sig: Take 1 tablet (1 mg total) by mouth daily   glucose blood (ONE TOUCH ULTRA TEST) test strip  Self No No   Sig: Patient tests once daily     Patient not taking: Reported on 6/23/2022   leuprolide (Eligard) 22 5 mg  Self Yes No   Sig: Inject 22 5 mg under the skin every 3 (three) months   metoprolol tartrate (LOPRESSOR) 25 mg tablet  Self No No   Sig: TAKE 2 TABLETS (50 MG) IN THE MORNING AND 1 TABLET IN THE EVENING   Patient taking differently: Take 25 mg by mouth daily   metoprolol tartrate (LOPRESSOR) 50 mg tablet  Self No No   Sig: TAKE 1 TABLET DAILY IN THE MORNING      Facility-Administered Medications: None       Past Medical History:   Diagnosis Date   • Basal cell carcinoma    • Benign polyp of large intestine    • Osteoarthritis of left hip     last assessed [id number]   • Piriformis syndrome of left side     last assessed 18Oct2016       Past Surgical History:   Procedure Laterality Date   • CARDIAC PACEMAKER PLACEMENT  05/01/2005   • CATARACT EXTRACTION Left 10/01/1995   • CATARACT EXTRACTION Right 02/01/2000   • CORONARY ANGIOPLASTY  01/01/2010 2010   • CORONARY ANGIOPLASTY WITH STENT PLACEMENT  11/01/2002   • INGUINAL HERNIA REPAIR Right 01/01/1998   • INGUINAL HERNIA REPAIR Left 01/01/2009 2009   • LUMBAR LAMINECTOMY  01/01/2002 2002   • REPLACEMENT TOTAL KNEE Right 06/01/2010    right total knee replacement with complications   • RHIZOTOMY     • TONSILLECTOMY AND ADENOIDECTOMY  01/01/1937    1937   • TOTAL HIP ARTHROPLASTY Left 01/18/2017       Family History   Problem Relation Age of Onset   • Coronary artery disease Mother    • Diabetes Maternal Grandmother      I have reviewed and agree with the history as documented  E-Cigarette/Vaping     E-Cigarette/Vaping Substances     Social History     Tobacco Use   • Smoking status: Never Smoker   • Smokeless tobacco: Never Used   Substance Use Topics   • Alcohol use: No   • Drug use: No       Review of Systems   Constitutional: Positive for fatigue and fever  Negative for appetite change and chills  HENT: Negative for congestion, rhinorrhea and sore throat  Respiratory: Negative for cough and shortness of breath  Cardiovascular: Negative for chest pain and leg swelling  Gastrointestinal: Negative for abdominal pain, diarrhea, nausea and vomiting     Genitourinary: Negative for dysuria, frequency and urgency  Skin: Negative for rash  Neurological: Negative for dizziness, weakness and numbness  Psychiatric/Behavioral: Negative for confusion  All other systems reviewed and are negative  Physical Exam  Physical Exam  Vitals and nursing note reviewed  Constitutional:       General: He is not in acute distress  Appearance: He is well-developed  He is not ill-appearing, toxic-appearing or diaphoretic  HENT:      Head: Normocephalic and atraumatic  Nose: Nose normal  No congestion  Eyes:      General: No scleral icterus  Conjunctiva/sclera: Conjunctivae normal    Cardiovascular:      Rate and Rhythm: Normal rate and regular rhythm  Heart sounds: Normal heart sounds  No murmur heard  Pulmonary:      Effort: Pulmonary effort is normal  No respiratory distress  Breath sounds: Normal breath sounds  Abdominal:      Palpations: Abdomen is soft  Tenderness: There is no abdominal tenderness  There is no guarding or rebound  Musculoskeletal:         General: No deformity  Normal range of motion  Cervical back: Normal range of motion  Right lower leg: No edema  Left lower leg: No edema  Skin:     General: Skin is warm and dry  Neurological:      Mental Status: He is alert and oriented to person, place, and time  Psychiatric:         Behavior: Behavior normal          Thought Content:  Thought content normal          Judgment: Judgment normal          Vital Signs  ED Triage Vitals [09/27/22 1641]   Temperature Pulse Respirations Blood Pressure SpO2   98 4 °F (36 9 °C) 88 22 131/63 98 %      Temp Source Heart Rate Source Patient Position - Orthostatic VS BP Location FiO2 (%)   Oral Monitor Sitting Right arm --      Pain Score       No Pain           Vitals:    09/27/22 1641 09/27/22 1900   BP: 131/63 129/60   Pulse: 88 62   Patient Position - Orthostatic VS: Sitting Lying         Visual Acuity      ED Medications  Medications - No data to display    Diagnostic Studies  Results Reviewed     Procedure Component Value Units Date/Time    Blood culture #1 [[id number]] Collected: 09/27/22 1914    Lab Status: Final result Specimen: Blood from Arm, Left Updated: 10/02/22 2303     Blood Culture No Growth After 5 Days  Blood culture #2 [[id number]] Collected: 09/27/22 1914    Lab Status: Final result Specimen: Blood from Arm, Right Updated: 10/02/22 2303     Blood Culture No Growth After 5 Days  B-Type Natriuretic Peptide(BNP) AN, CA, EA Campuses Only [[id number]]  (Abnormal) Collected: 09/27/22 1914    Lab Status: Final result Specimen: Blood from Arm, Left Updated: 09/27/22 1945      pg/mL     D-Dimer [[id number]]  (Abnormal) Collected: 09/27/22 1914    Lab Status: Final result Specimen: Blood from Arm, Left Updated: 09/27/22 1940     D-Dimer, Quant 0 98 ug/ml FEU     Narrative: In the evaluation for possible pulmonary embolism, in the appropriate (Well's Score of 4 or less) patient, the age adjusted d-dimer cutoff for this patient can be calculated as:    Age x 0 01 (in ug/mL) for Age-adjusted D-dimer exclusion threshold for a patient over 50 years      HS Troponin I 2hr [[id number]]  (Normal) Collected: 09/27/22 1824    Lab Status: Final result Specimen: Blood Updated: 09/27/22 1929     hs TnI 2hr 9 ng/L      Delta 2hr hsTnI 1 ng/L     HS Troponin 0hr (reflex protocol) [[id number]]  (Normal) Collected: 09/27/22 1645    Lab Status: Final result Specimen: Blood from Arm, Left Updated: 09/27/22 1723     hs TnI 0hr 8 ng/L     Comprehensive metabolic panel [[id number]] Collected: 09/27/22 1645    Lab Status: Final result Specimen: Blood from Arm, Left Updated: 09/27/22 1714     Sodium 138 mmol/L      Potassium 4 0 mmol/L      Chloride 105 mmol/L      CO2 28 mmol/L      ANION GAP 5 mmol/L      BUN 23 mg/dL      Creatinine 1 14 mg/dL      Glucose 92 mg/dL      Calcium 9 0 mg/dL      AST 21 U/L      ALT 14 U/L      Alkaline Phosphatase 49 U/L      Total Protein 6 6 g/dL Albumin 3 9 g/dL      Total Bilirubin 0 63 mg/dL      eGFR 57 ml/min/1 73sq m     Narrative:      [place] guidelines for Chronic Kidney Disease (CKD):   •  Stage 1 with normal or high GFR (GFR > 90 mL/min/1 73 square meters)  •  Stage 2 Mild CKD (GFR = 60-89 mL/min/1 73 square meters)  •  Stage 3A Moderate CKD (GFR = 45-59 mL/min/1 73 square meters)  •  Stage 3B Moderate CKD (GFR = 30-44 mL/min/1 73 square meters)  •  Stage 4 Severe CKD (GFR = 15-29 mL/min/1 73 square meters)  •  Stage 5 End Stage CKD (GFR <15 mL/min/1 73 square meters)  Note: GFR calculation is accurate only with a steady state creatinine    CBC and differential [[id number]]  (Abnormal) Collected: 09/27/22 1645    Lab Status: Final result Specimen: Blood from Arm, Left Updated: 09/27/22 1653     WBC 5 41 Thousand/uL      RBC 3 66 Million/uL      Hemoglobin 11 2 g/dL      Hematocrit 35 3 %      MCV 96 fL      MCH 30 6 pg      MCHC 31 7 g/dL      RDW 13 5 %      MPV 10 4 fL      Platelets 219 Thousands/uL      nRBC 0 /100 WBCs      Neutrophils Relative 75 %      Immat GRANS % 0 %      Lymphocytes Relative 9 %      Monocytes Relative 16 %      Eosinophils Relative 0 %      Basophils Relative 0 %      Neutrophils Absolute 4 07 Thousands/µL      Immature Grans Absolute 0 01 Thousand/uL      Lymphocytes Absolute 0 46 Thousands/µL      Monocytes Absolute 0 84 Thousand/µL      Eosinophils Absolute 0 01 Thousand/µL      Basophils Absolute 0 02 Thousands/µL                  XR chest 1 view portable   ED Interpretation by Marietta Lorenz MD (09/27 2034)   NAD      Final Result by Edna Fernandez MD (09/28 0805)   COPD      No acute cardiopulmonary disease        Findings are stable            Workstation performed: [id number]                    Procedures  ECG 12 Lead Documentation Only    Date/Time: 10/14/2022 11:33 PM  Performed by: Marietta Lorenz MD  Authorized by: Marietta Lorenz MD     Indications / Diagnosis: COVID  ECG reviewed by me, the ED Provider: yes    Patient location:  ED  Previous ECG:     Previous ECG:  Compared to current    Comparison ECG info:  4/7/2016  Interpretation:     Interpretation: abnormal    Rate:     ECG rate:  72 BPM    ECG rate assessment: normal    Rhythm:     Rhythm: paced    Pacing:     Capture:  Complete    Type of pacing:  AV  Comments:      AV sequential or dual chamber electronic pacemaker  When compared with ECG of 07-APR-2016 06:01,  No significant change was found               ED Course  ED Course as of 10/14/22 2327   Tue Sep 27, 2022   1919 CBC and differential(!)  Mild anemia, slightly below baseline, otherwise No significant leukocytosis reassuring diff, normal H/H, normal platelets  1930 HS Troponin I 2hr  Decreasing delta trop   1943 D-Dimer, Quant(!): 0 98  Mildly elevated   2001 BNP(!): 247  Mildly elevated                                             MDM  Number of Diagnoses or Management Options  COVID-19  Generalized weakness  Diagnosis management comments: Pt with COVID dx and concern for dehydration  ON exam pt well appearing, no clinical signs of VTE or volume overload  Work up reassuring  Pt feels well, would like to go home  Paxlovid prescribed  RTER precautions discussed and documented on discharge paperwork, pt and family endorsed good understanding of reasons to return              Amount and/or Complexity of Data Reviewed  Clinical lab tests: ordered and reviewed  Tests in the radiology section of CPT®: reviewed and ordered  Tests in the medicine section of CPT®: ordered and reviewed  Review and summarize past medical records: yes  Independent visualization of images, tracings, or specimens: yes    Risk of Complications, Morbidity, and/or Mortality  Presenting problems: moderate  Diagnostic procedures: moderate  Management options: moderate    Patient Progress  Patient progress: stable      Disposition  Final diagnoses:   COVID-19   Generalized weakness Time reflects when diagnosis was documented in both MDM as applicable and the Disposition within this note     Time User Action Codes Description Comment    9/27/2022  8:21 PM [facility] Add [U07 1] COVID-19     9/27/2022  8:21 PM [facility] Add [R53 1] Generalized weakness       ED Disposition     ED Disposition   Discharge    Condition   Stable    Date/Time   Tue Sep 27, 2022  8:21 PM    Comment   Little Perry Sr  discharge to home/self care  Follow-up Information     Follow up With Specialties Details Why Contact Info Additional 200 Paulo Roche MD Family Medicine Schedule an appointment as soon as possible for a visit   [address] [facility] [id number]  [address] [place] [address] Emergency Department Emergency Medicine  As needed, If symptoms worsen [address] [facility] [id number] [place] [address] Emergency Department, [address], [place], [place], [address]          Discharge Medication List as of 9/27/2022  8:27 PM      START taking these medications    Details   nirmatrelvir & ritonavir (Paxlovid, 150/100,) tablet therapy pack Take 2 tablets by mouth 2 (two) times a day for 5 days Take 1 nirmatrelvir tablet + 1 ritonavir tablet together per dose, Starting Tue 9/27/2022, Until Sun 10/2/2022, Normal         CONTINUE these medications which have NOT CHANGED    Details   aspirin 81 MG tablet Take 1 tablet by mouth daily Except sun , Historical Med      atorvastatin (LIPITOR) 10 mg tablet TAKE 1 TABLET DAILY, Normal      cephalexin (KEFLEX) 500 mg capsule For dental visits, Starting Sat 3/16/2019, Historical Med      glimepiride (AMARYL) 1 mg tablet Take 1 tablet (1 mg total) by mouth daily, Starting Fri 4/22/2022, Normal      glucose blood (ONE TOUCH ULTRA TEST) test strip Patient tests once daily  , Normal      Lancets (OneTouch Delica Plus [id number]) MISC Use 1 application daily, Starting Mon 11/1/2021, Normal      leuprolide (Eligard) 22 5 mg Inject 22 5 mg under the skin every 3 (three) months, Historical Med      !! metoprolol tartrate (LOPRESSOR) 25 mg tablet TAKE 2 TABLETS (50 MG) IN THE MORNING AND 1 TABLET IN THE EVENING, Normal      !! metoprolol tartrate (LOPRESSOR) 50 mg tablet TAKE 1 TABLET DAILY IN THE MORNING, Normal      Multiple Vitamins-Minerals (MULTIVITAMIN ADULT PO) Take 1 tablet by mouth every other day , Historical Med       !! - Potential duplicate medications found  Please discuss with provider  No discharge procedures on file      PDMP Review     None          ED Provider  Electronically Signed by           Zeyad Santos MD  10/14/22 3067

## 2022-10-18 ENCOUNTER — REMOTE DEVICE CLINIC VISIT (OUTPATIENT)
Dept: CARDIOLOGY CLINIC | Facility: CLINIC | Age: 87
End: 2022-10-18
Payer: MEDICARE

## 2022-10-18 DIAGNOSIS — Z95.0 PRESENCE OF PERMANENT CARDIAC PACEMAKER: Primary | ICD-10-CM

## 2022-10-18 PROCEDURE — 93294 REM INTERROG EVL PM/LDLS PM: CPT | Performed by: INTERNAL MEDICINE

## 2022-10-18 PROCEDURE — 93296 REM INTERROG EVL PM/IDS: CPT | Performed by: INTERNAL MEDICINE

## 2022-10-18 NOTE — PROGRESS NOTES
MDT DUAL PPM - NOT MRI CONDITIONAL   CAREL[employer] TRANSMISSION:  BATTERY VOLTAGE ADEQUATE (6 5 YR )   AP 96 9%  91 6% (>40%/AVB)   ALL AVAILABLE LEAD PARAMETERS WITHIN NORMAL LIMITS   NO SIGNIFICANT HIGH RATE EPISODES   NORMAL DEVICE FUNCTION   RG

## 2022-11-28 ENCOUNTER — TELEPHONE (OUTPATIENT)
Dept: CARDIOLOGY CLINIC | Facility: CLINIC | Age: 87
End: 2022-11-28

## 2022-11-28 NOTE — TELEPHONE ENCOUNTER
Mr Jody Ji said he is scheduled for eyelid surgery on 12/16, and has been asked to hold his aspirin for 7 days prior to the surgery  Please advise if ok to hold

## 2022-12-02 ENCOUNTER — APPOINTMENT (EMERGENCY)
Dept: RADIOLOGY | Facility: HOSPITAL | Age: 87
End: 2022-12-02

## 2022-12-02 ENCOUNTER — HOSPITAL ENCOUNTER (OUTPATIENT)
Facility: HOSPITAL | Age: 87
Setting detail: OBSERVATION
Discharge: HOME | End: 2022-12-02
Attending: EMERGENCY MEDICINE | Admitting: INTERNAL MEDICINE

## 2022-12-02 VITALS
DIASTOLIC BLOOD PRESSURE: 91 MMHG | HEART RATE: 72 BPM | OXYGEN SATURATION: 97 % | TEMPERATURE: 97.6 F | SYSTOLIC BLOOD PRESSURE: 148 MMHG | RESPIRATION RATE: 16 BRPM

## 2022-12-02 DIAGNOSIS — R07.9 CHEST PAIN, UNSPECIFIED TYPE: Primary | ICD-10-CM

## 2022-12-02 LAB
2HR DELTA HS TROPONIN: 1 NG/L
4HR DELTA HS TROPONIN: 0 NG/L
ALBUMIN SERPL BCP-MCNC: 3.8 G/DL (ref 3.5–5)
ALP SERPL-CCNC: 49 U/L (ref 34–104)
ALT SERPL W P-5'-P-CCNC: 12 U/L (ref 7–52)
ANION GAP SERPL CALCULATED.3IONS-SCNC: 7 MMOL/L (ref 4–13)
APTT PPP: 29 SECONDS (ref 23–37)
AST SERPL W P-5'-P-CCNC: 17 U/L (ref 13–39)
ATRIAL RATE: 78 BPM
BACTERIA UR QL AUTO: ABNORMAL /HPF
BASOPHILS # BLD AUTO: 0.04 THOUSANDS/ÂΜL (ref 0–0.1)
BASOPHILS NFR BLD AUTO: 1 % (ref 0–1)
BILIRUB SERPL-MCNC: 0.36 MG/DL (ref 0.2–1)
BILIRUB UR QL STRIP: NEGATIVE
BNP SERPL-MCNC: 88 PG/ML (ref 0–100)
BUN SERPL-MCNC: 28 MG/DL (ref 5–25)
CALCIUM SERPL-MCNC: 8.9 MG/DL (ref 8.4–10.2)
CARDIAC TROPONIN I PNL SERPL HS: 5 NG/L
CARDIAC TROPONIN I PNL SERPL HS: 5 NG/L
CARDIAC TROPONIN I PNL SERPL HS: 6 NG/L
CHLORIDE SERPL-SCNC: 106 MMOL/L (ref 96–108)
CLARITY UR: CLEAR
CO2 SERPL-SCNC: 25 MMOL/L (ref 21–32)
COLOR UR: COLORLESS
CREAT SERPL-MCNC: 1.07 MG/DL (ref 0.6–1.3)
EOSINOPHIL # BLD AUTO: 0.37 THOUSAND/ÂΜL (ref 0–0.61)
EOSINOPHIL NFR BLD AUTO: 7 % (ref 0–6)
ERYTHROCYTE [DISTWIDTH] IN BLOOD BY AUTOMATED COUNT: 13 % (ref 11.6–15.1)
GFR SERPL CREATININE-BSD FRML MDRD: 61 ML/MIN/1.73SQ M
GLUCOSE SERPL-MCNC: 111 MG/DL (ref 65–140)
GLUCOSE SERPL-MCNC: 116 MG/DL (ref 65–140)
GLUCOSE UR STRIP-MCNC: NEGATIVE MG/DL
HCT VFR BLD AUTO: 36.4 % (ref 36.5–49.3)
HGB BLD-MCNC: 11.8 G/DL (ref 12–17)
HGB UR QL STRIP.AUTO: ABNORMAL
IMM GRANULOCYTES # BLD AUTO: 0.01 THOUSAND/UL (ref 0–0.2)
IMM GRANULOCYTES NFR BLD AUTO: 0 % (ref 0–2)
INR PPP: 1.1 (ref 0.84–1.19)
KETONES UR STRIP-MCNC: NEGATIVE MG/DL
LEUKOCYTE ESTERASE UR QL STRIP: NEGATIVE
LYMPHOCYTES # BLD AUTO: 1.29 THOUSANDS/ÂΜL (ref 0.6–4.47)
LYMPHOCYTES NFR BLD AUTO: 23 % (ref 14–44)
MCH RBC QN AUTO: 30.3 PG (ref 26.8–34.3)
MCHC RBC AUTO-ENTMCNC: 32.4 G/DL (ref 31.4–37.4)
MCV RBC AUTO: 94 FL (ref 82–98)
MONOCYTES # BLD AUTO: 0.51 THOUSAND/ÂΜL (ref 0.17–1.22)
MONOCYTES NFR BLD AUTO: 9 % (ref 4–12)
NEUTROPHILS # BLD AUTO: 3.38 THOUSANDS/ÂΜL (ref 1.85–7.62)
NEUTS SEG NFR BLD AUTO: 60 % (ref 43–75)
NITRITE UR QL STRIP: NEGATIVE
NON-SQ EPI CELLS URNS QL MICRO: ABNORMAL /HPF
NRBC BLD AUTO-RTO: 0 /100 WBCS
PH UR STRIP.AUTO: 6 [PH]
PLATELET # BLD AUTO: 206 THOUSANDS/UL (ref 149–390)
PMV BLD AUTO: 10.5 FL (ref 8.9–12.7)
POTASSIUM SERPL-SCNC: 4.4 MMOL/L (ref 3.5–5.3)
PR INTERVAL: 178 MS
PROT SERPL-MCNC: 6.5 G/DL (ref 6.4–8.4)
PROT UR STRIP-MCNC: NEGATIVE MG/DL
PROTHROMBIN TIME: 14.4 SECONDS (ref 11.6–14.5)
QRS AXIS: -64 DEGREES
QRSD INTERVAL: 174 MS
QT INTERVAL: 456 MS
QTC INTERVAL: 519 MS
RBC # BLD AUTO: 3.89 MILLION/UL (ref 3.88–5.62)
RBC #/AREA URNS AUTO: ABNORMAL /HPF
SODIUM SERPL-SCNC: 138 MMOL/L (ref 135–147)
SP GR UR STRIP.AUTO: 1.02 (ref 1–1.03)
T WAVE AXIS: 86 DEGREES
UROBILINOGEN UR STRIP-ACNC: <2 MG/DL
VENTRICULAR RATE: 78 BPM
WBC # BLD AUTO: 5.6 THOUSAND/UL (ref 4.31–10.16)
WBC #/AREA URNS AUTO: ABNORMAL /HPF

## 2022-12-02 RX ORDER — ASPIRIN 81 MG/1
81 TABLET ORAL ONCE
Status: COMPLETED | OUTPATIENT
Start: 2022-12-02 | End: 2022-12-02

## 2022-12-02 RX ORDER — ATORVASTATIN CALCIUM 10 MG/1
10 TABLET, FILM COATED ORAL DAILY
Status: DISCONTINUED | OUTPATIENT
Start: 2022-12-02 | End: 2022-12-02 | Stop reason: HOSPADM

## 2022-12-02 RX ORDER — ENOXAPARIN SODIUM 100 MG/ML
40 INJECTION SUBCUTANEOUS DAILY
Status: DISCONTINUED | OUTPATIENT
Start: 2022-12-02 | End: 2022-12-02 | Stop reason: HOSPADM

## 2022-12-02 RX ORDER — METOPROLOL TARTRATE 50 MG/1
50 TABLET, FILM COATED ORAL DAILY
Status: DISCONTINUED | OUTPATIENT
Start: 2022-12-02 | End: 2022-12-02 | Stop reason: HOSPADM

## 2022-12-02 RX ORDER — ACETAMINOPHEN 325 MG/1
650 TABLET ORAL EVERY 6 HOURS PRN
Status: DISCONTINUED | OUTPATIENT
Start: 2022-12-02 | End: 2022-12-02 | Stop reason: HOSPADM

## 2022-12-02 RX ORDER — INSULIN LISPRO 100 [IU]/ML
1-6 INJECTION, SOLUTION INTRAVENOUS; SUBCUTANEOUS
Status: DISCONTINUED | OUTPATIENT
Start: 2022-12-02 | End: 2022-12-02 | Stop reason: HOSPADM

## 2022-12-02 RX ORDER — INSULIN LISPRO 100 [IU]/ML
1-5 INJECTION, SOLUTION INTRAVENOUS; SUBCUTANEOUS
Status: DISCONTINUED | OUTPATIENT
Start: 2022-12-02 | End: 2022-12-02 | Stop reason: HOSPADM

## 2022-12-02 RX ORDER — ACETAMINOPHEN 325 MG/1
650 TABLET ORAL ONCE
Status: COMPLETED | OUTPATIENT
Start: 2022-12-02 | End: 2022-12-02

## 2022-12-02 RX ADMIN — ASPIRIN 81 MG: 81 TABLET, COATED ORAL at 08:37

## 2022-12-02 RX ADMIN — METOPROLOL TARTRATE 50 MG: 50 TABLET, FILM COATED ORAL at 08:36

## 2022-12-02 RX ADMIN — ACETAMINOPHEN 650 MG: 325 TABLET, FILM COATED ORAL at 10:56

## 2022-12-02 RX ADMIN — B-COMPLEX W/ C & FOLIC ACID TAB 1 TABLET: TAB at 08:36

## 2022-12-02 RX ADMIN — ENOXAPARIN SODIUM 40 MG: 40 INJECTION SUBCUTANEOUS at 08:37

## 2022-12-02 RX ADMIN — ATORVASTATIN CALCIUM 10 MG: 10 TABLET, FILM COATED ORAL at 08:36

## 2022-12-02 NOTE — DISCHARGE INSTR - AVS FIRST PAGE
Dear Guillermina Black ,     It was our pleasure to care for you here at [facility]  It is our hope that we were always able to exceed the expected standards for your care during your stay  You were hospitalized due to musculoskeletal chest pain  You were cared for on the ED floor by Zhanna Vega MD under the service of Danny Palmer MD with the [facility] who covers for your primary care physician (PCP), Johnathan Maynard MD, while you were hospitalized  If you have any questions or concerns related to this hospitalization, you may contact us at [phone]  For follow up as well as any medication refills, we recommend that you follow up with your primary care physician  A registered nurse will reach out to you by phone within a few days after your discharge to answer any additional questions that you may have after going home  However, at this time we provide for you here, the most important instructions / recommendations at discharge:     Notable Medication Adjustments -   None  Testing Required after Discharge -   None  Important follow up information -   Follow-up with primary care provider within 1 week  Other Instructions -   Continue with Tylenol 650 mg every 6  to 8 hours as need for pain  Please review this entire after visit summary as additional general instructions including medication list, appointments, activity, diet, any pertinent wound care, and other additional recommendations from your care team that may be provided for you        Sincerely,     Zhanna Vega MD

## 2022-12-02 NOTE — H&P
[facility]  H&P- Jackie Ji   1934, 80 y o  male MRN: [id number]  Unit/Bed#: ED-25 Encounter: [id number]  Primary Care Provider: Satinder Deleon MD   Date and time admitted to hospital: 12/2/2022  3:31 AM    Chest pain  Assessment & Plan  Non reproducible chest pain which resolved with nitroglycerin and came on her rest   Not provoked by activity  Awoke from sleep  Possibly ACS, possibly GERD because he had a abnormal meal with significant fat that previous night  Plan:  -telemetry monitoring 48 hours  -full aspirin dose given by EMS  -following troponin (5 --> 6 --> ___)  -if chest pain returns, repeat EKG  -cardiology consult, appreciate recommendations  CAD (coronary artery disease)  Assessment & Plan  Patient has a history PCI stenting of the LAD, and takes aspirin at home     -continue home aspirin 81 mg daily    Type 2 diabetes mellitus ([facility] [address] )  Assessment & Plan  Lab Results   Component Value Date    HGBA1C 6 0 (H) 06/13/2022       No results for input(s): POCGLU in the last 72 hours  Blood Sugar Average: Last 72 hrs:     Patient takes glimepiride at home     -ordered sliding scale insulin mealtime and bedtime  -hypoglycemia protocol  -blood glucose checks before meals and before bedtime    Hypertension  Assessment & Plan  -Continue home metoprolol 50 mg in the morning, 25 in the evening  Hyperlipidemia  Assessment & Plan  Continuing home atorvastatin    VTE Pharmacologic Prophylaxis: VTE Score: 3 Moderate Risk (Score 3-4) - Pharmacological DVT Prophylaxis Ordered: enoxaparin (Lovenox)  Code Status: Level 1 - Full Code   Discussion with family: Updated  (wife) at bedside  Anticipated Length of Stay: Patient will be admitted on an observation basis with an anticipated length of stay of less than 2 midnights secondary to Acute chest pain      Chief Complaint:  Chest pain    History of Present Illness:  Terri Murphy is a 80 y o  male with a PMH of CAD, lad stent, pacemaker, hypertension, aortic stenosis, mitral valve abnormality, sick sinus syndrome, who presents with chest pain  Patient is having chest pain, which 1st occurred 12/01/2022 when he had only a few minutes of chest pain centered around his pacemaker implantation the left side of his chest   On the morning of presentation 12/02/2022 at 2:00 a m , he had chest pain that woke him up from his sleep, again centered around the pacemaker, and now radiating down his left arm  The patient was unable to characterize the pain  He stood and walked as well as moved his left arm, without exacerbating the pain  He was transported to the hospital by EMS, and was given nitroglycerin, which immediately resolved his pain  He had the pain in total for about 1 hour  Patient is generally a active person, and has never had chest pain, not even on exertion in the past   Additionally he did have a meal with more fat than usual in it yesterday when going to a pot luck, and he says he has never experienced heart burn/gastric reflux before, but is unsure if this is what it would feel like  Of note, he is a very active 80year-old, he does significant amount of physical chores around the house, even using ladders to hang decorations  He is very functional at baseline, particularly from an ambulation standpoint  Lives at home with his wife, in his own house with a yard that he tends to  Review of Systems:  Review of Systems   Constitutional: Negative for appetite change, chills, fatigue, fever and unexpected weight change  HENT: Negative for ear pain, hearing loss, postnasal drip, rhinorrhea, sinus pain, sore throat, trouble swallowing and voice change  Eyes: Negative for pain and visual disturbance  Respiratory: Negative for cough, chest tightness and shortness of breath  Cardiovascular: Positive for chest pain  Negative for palpitations          Chest pain prior to arriving to the hospital Gastrointestinal: Positive for nausea  Negative for abdominal pain, diarrhea and vomiting  Nausea only with his chest pain   Genitourinary: Negative for dysuria, flank pain, frequency, hematuria and urgency  Musculoskeletal: Negative for arthralgias, back pain, gait problem and myalgias  Skin: Negative for color change and rash  Neurological: Negative for seizures, syncope, light-headedness, numbness and headaches  Psychiatric/Behavioral: Negative for agitation, behavioral problems and confusion  All other systems reviewed and are negative  Past Medical and Surgical History:   Past Medical History:   Diagnosis Date   • Basal cell carcinoma    • Benign polyp of large intestine    • Osteoarthritis of left hip     last assessed [id number]   • Piriformis syndrome of left side     last assessed [id number]       Past Surgical History:   Procedure Laterality Date   • CARDIAC PACEMAKER PLACEMENT  05/01/2005   • CATARACT EXTRACTION Left 10/01/1995   • CATARACT EXTRACTION Right 02/01/2000   • CORONARY ANGIOPLASTY  01/01/2010 2010   • CORONARY ANGIOPLASTY WITH STENT PLACEMENT  11/01/2002   • INGUINAL HERNIA REPAIR Right 01/01/1998   • INGUINAL HERNIA REPAIR Left 01/01/2009 2009   • LUMBAR LAMINECTOMY  01/01/2002 2002   • REPLACEMENT TOTAL KNEE Right 06/01/2010    right total knee replacement with complications   • RHIZOTOMY     • TONSILLECTOMY AND ADENOIDECTOMY  01/01/1937    1937   • TOTAL HIP ARTHROPLASTY Left 01/18/2017       Meds/Allergies:  Prior to Admission medications    Medication Sig Start Date End Date Taking?  Authorizing Provider   aspirin 81 MG tablet Take 1 tablet by mouth daily Except sun    Yes Historical Provider, MD   atorvastatin (LIPITOR) 10 mg tablet TAKE 1 TABLET DAILY 9/27/22  Yes Nanci Cast MD   glimepiride (AMARYL) 1 mg tablet Take 1 tablet (1 mg total) by mouth daily 4/22/22  Yes Roberta Souza MD   glucose blood (ONE TOUCH ULTRA TEST) test strip Patient tests once daily  11/1/21  Yes Frederick Boxer, MD   Lancets (OneTouch Delica Plus [id number]) MISC Use 1 application daily 33/7/63  Yes Frederick Boxer, MD   leuprolide (Eligard) 22 5 mg Inject 22 5 mg under the skin every 3 (three) months   Yes Historical Provider, MD   metoprolol tartrate (LOPRESSOR) 25 mg tablet TAKE 2 TABLETS (50 MG) IN THE MORNING AND 1 TABLET IN THE EVENING  Patient taking differently: Take 25 mg by mouth daily 12/10/21  Yes Tamsen Boeck, MD   Multiple Vitamins-Minerals (MULTIVITAMIN ADULT PO) Take 1 tablet by mouth every other day  Yes Historical Provider, MD   cephalexin (KEFLEX) 500 mg capsule For dental visits 3/16/19   Historical Provider, MD   metoprolol tartrate (LOPRESSOR) 50 mg tablet TAKE 1 TABLET DAILY IN THE MORNING  Patient not taking: Reported on 12/2/2022 3/1/22   Tamsen Boeck, MD     I have reviewed home medications with patient personally  Allergies: Allergies   Allergen Reactions   • Penicillins Rash   • Plavix [Clopidogrel] Rash       Social History:  Marital Status: /Civil Union   Occupation:  Retired, does not know of any work related exposures is past  Patient Pre-hospital Living Situation: Home  Patient Pre-hospital Level of Mobility: walks  Patient Pre-hospital Diet Restrictions:  Calorie restricts and sugar restrict his diet, feels that he maintains fairly good glycemic control with this    Substance Use History:   Social History     Substance and Sexual Activity   Alcohol Use No     Social History     Tobacco Use   Smoking Status Never   Smokeless Tobacco Never     Social History     Substance and Sexual Activity   Drug Use No       Family History:  Family History   Problem Relation Age of Onset   • Coronary artery disease Mother    • Diabetes Maternal Grandmother        Physical Exam:     Vitals:   Blood Pressure: 145/70 (12/02/22 0800)  Pulse: 68 (12/02/22 0800)  Temperature: 97 6 °F (36 4 °C) (12/02/22 0800)  Temp Source: Oral (12/02/22 0800)  Respirations: 16 (12/02/22 0800)  SpO2: 96 % (12/02/22 0800)    Physical Exam  Constitutional:       General: He is not in acute distress  Appearance: Normal appearance  He is not ill-appearing or toxic-appearing  HENT:      Head: Normocephalic and atraumatic  Nose: Nose normal  No congestion or rhinorrhea  Mouth/Throat:      Mouth: Mucous membranes are moist    Eyes:      General: No scleral icterus  Right eye: No discharge  Left eye: No discharge  Conjunctiva/sclera: Conjunctivae normal       Pupils: Pupils are equal, round, and reactive to light  Cardiovascular:      Rate and Rhythm: Normal rate and regular rhythm  Pulses: Normal pulses  Heart sounds: Murmur heard  Comments: Systolic murmur heard  Pulmonary:      Breath sounds: Normal breath sounds  No wheezing, rhonchi or rales  Comments: Chest pain no longer present at admission, and non reproducible on palpation  Chest:      Chest wall: No tenderness  Abdominal:      General: Bowel sounds are normal  There is no distension  Palpations: Abdomen is soft  There is no mass  Tenderness: There is no abdominal tenderness  There is no guarding or rebound  Musculoskeletal:         General: No swelling or deformity  Cervical back: Neck supple  No tenderness  Right lower leg: No edema  Left lower leg: No edema  Skin:     General: Skin is warm and dry  Coloration: Skin is not jaundiced  Findings: No rash  Neurological:      General: No focal deficit present  Mental Status: He is alert and oriented to person, place, and time  Sensory: No sensory deficit  Motor: No weakness  Psychiatric:         Mood and Affect: Mood normal          Behavior: Behavior normal          Thought Content:  Thought content normal           Additional Data:     Lab Results:  Results from last 7 days   Lab Units 12/02/22  0339   WBC Thousand/uL 5 60   HEMOGLOBIN g/dL 11 8*   HEMATOCRIT % 36 4*   PLATELETS Thousands/uL 206   NEUTROS PCT % 60   LYMPHS PCT % 23   MONOS PCT % 9   EOS PCT % 7*     Results from last 7 days   Lab Units 12/02/22  0339   SODIUM mmol/L 138   POTASSIUM mmol/L 4 4   CHLORIDE mmol/L 106   CO2 mmol/L 25   BUN mg/dL 28*   CREATININE mg/dL 1 07   ANION GAP mmol/L 7   CALCIUM mg/dL 8 9   ALBUMIN g/dL 3 8   TOTAL BILIRUBIN mg/dL 0 36   ALK PHOS U/L 49   ALT U/L 12   AST U/L 17   GLUCOSE RANDOM mg/dL 116     Results from last 7 days   Lab Units 12/02/22  0339   INR  1 10                   Lines/Drains:  Invasive Devices     Peripheral Intravenous Line  Duration           Peripheral IV 12/02/22 Left;Ventral (anterior) Forearm <1 day                    Imaging: Personally reviewed the following imaging: chest xray  XR chest 1 view portable   ED Interpretation by Bettie Ziegler PA-C (12/02 0427)   No acute findings           EKG and Other Studies Reviewed on Admission:   · EKG: NSR  HR 75  Paced    ** Please Note: This note has been constructed using a voice recognition system   **

## 2022-12-02 NOTE — ASSESSMENT & PLAN NOTE
-Continue home metoprolol 50 mg in the morning, 25 in the evening    Follow-up primary care provider

## 2022-12-02 NOTE — ASSESSMENT & PLAN NOTE
Non reproducible chest pain which resolved with nitroglycerin and came on at rest   Not provoked by activity  Awoke from sleep  Possibly ACS, possibly GERD because he had a abnormal meal with significant fat that previous night  Plan:  -telemetry monitoring 48 hours  -full aspirin dose given by EMS  -following troponin (5 --> 6 --> ___)  -if chest pain returns, repeat EKG  -cardiology consult, appreciate recommendations

## 2022-12-02 NOTE — LETTER
[address]  Leaving Against Medical Advice [facility])  Patient:  Deb Henao:  1934  MRN: [id number]  Date of Service: 12/2/2022    I, _____________________________________, am leaving the Hospital against medical advice (AMA), I am aware of the risks involved, and the possibility that my health may be negatively affected  Specifically, I have been told by my physician or his or her designee that my leaving the Hospital may result in the following: (to be completed by doctor/designee)  _________________________________________________________________________  _________________________________________________________________________  _________________________________________________________________________     I hereby release my physician(s), [address] [facility], its affiliates , and its officers, directors, employees and agents from all liability that might otherwise be asserted as a result of not providing the prescribed care, and from all responsibility for any ill effects which may result from my decision to leave the Hospital AMA at this time  I attest that I am of full age and am mentally competent to make this determination and decision  ___________________________________________________                                                     Patient's Signature                                                    _____________________        __________________________                                                    Date                     Time    *The patient is unable to acknowledge/refuse because (to also be used if the patient is a minor):    I therefore acknowledge/refuse for the patient       ____________________________________                  _________________________________  Signature                                                                         Relationship to Patient __________________      ____________                                                                                             Date                                  Time     I declare that I have personally explained the above information to the patient     _______________________________________          ___________________     _____________  Doctor's/designee's Signature                                    Date                          Time

## 2022-12-02 NOTE — ASSESSMENT & PLAN NOTE
· POA: Non reproducible chest pain which resolved with nitroglycerin and came on at rest   Not provoked by activity  Awoke from sleep  · Patient endorse during the week he was hanging logan light otherwise does not endorse any other triggered  States that while he had the pain moving his arm would exacerbate pain  · PE: Reproducible tenderness to palpation  Possibly ACS, possibly GERD because he had a abnormal meal with significant fat that previous night  · Full dose of aspirin received on route via EMS  · At the ED ECG without ST changes, troponins 5, 6, 5 with delta was 0 after 4 hours  · Cardiology consulted appreciate recommendations status that this time no further evaluation while inpatient  Chest pain most likely musculoskeletal given that is reproducible no cardiac involvement      Plan:  Patient will continue with Tylenol 650 mg every 6-8 hours as needed for pain  Follow-up with primary care provider within 1-2 weeks

## 2022-12-02 NOTE — DISCHARGE SUMMARY
[facility]  Discharge- Abbi Navarro Sr  1934, 80 y o  male MRN: [id number]  Unit/Bed#: ED-25 Encounter: [id number]  Primary Care Provider: Papa Dougherty MD   Date and time admitted to hospital: 12/2/2022  3:31 AM    * Chest pain  Assessment & Plan  · POA: Non reproducible chest pain which resolved with nitroglycerin and came on at rest   Not provoked by activity  Awoke from sleep  · Patient endorse during the week he was hanging logan light otherwise does not endorse any other triggered  States that while he had the pain moving his arm would exacerbate pain  · PE: Reproducible tenderness to palpation  Possibly ACS, possibly GERD because he had a abnormal meal with significant fat that previous night  · Full dose of aspirin received on route via EMS  · At the ED ECG without ST changes, troponins 5, 6, 5 with delta was 0 after 4 hours  · Cardiology consulted appreciate recommendations status that this time no further evaluation while inpatient  Chest pain most likely musculoskeletal given that is reproducible no cardiac involvement  Plan:  Patient will continue with Tylenol 650 mg every 6-8 hours as needed for pain  Follow-up with primary care provider within 1-2 weeks    CAD (coronary artery disease)  Assessment & Plan  Patient has a history PCI stenting of the LAD, and takes aspirin at home     -continue home aspirin 81 mg daily    Hyperlipidemia  Assessment & Plan  History hyperlipidemia  Continuing home atorvastatin  Follow-up with primary care within 1-2 weeks  Type 2 diabetes mellitus [facility])  Assessment & Plan  Lab Results   Component Value Date    HGBA1C 6 0 (H) 06/13/2022       Recent Labs     12/02/22  0842   POCGLU 111       Blood Sugar Average: Last 72 hrs:  (P) 111   Continue glimepiride at home    Follow-up with primary care provider within 1 week        Hypertension  Assessment & Plan  -Continue home metoprolol 50 mg in the morning, 25 in the evening  Follow-up primary care provider        Medical Problems     Resolved Problems  Date Reviewed: 12/2/2022   None       Discharging Resident: Daxa Diaz MD  Discharging Attending: Jamshid Crain MD  PCP: Jazmín Sapp MD  Admission Date:   Admission Orders (From admission, onward)     Ordered        12/02/22 0453  Place in Observation  Once                      Discharge Date: 12/02/22    Consultations During Hospital Stay:  · Cardiology    Procedures Performed:   · None    Significant Findings / Test Results:   XR chest 1 view portable   ED Interpretation by Maria R Holt PA-C (12/02 3796)   No acute findings       Final Result by Daryle Islam, MD (12/02 5673)      No acute cardiopulmonary disease  Workstation performed: [id number]         ·     Incidental Findings:   · None       Test Results Pending at Discharge (will require follow up): · None     Outpatient Tests Requested:  · None    Complications:  None    Reason for Admission:  Chest pain    Hospital Course:   Shweta Rojas  is a 80 y o  male patient who originally presented to the hospital on 12/2/2022 due to chest pain  Upon presentation chest pain had resolved with 324 mg Aspirin given by EMS team   At the ED chest x-ray was unremarkable  ECG with no ST elevation, serial troponins negative  Cardiology consulted for further management and evaluation however Chest pain which was reproducible was most likely secondary to prior physical activities during the week for which no cardiac pathology was found at this time  Patient was then deemed stable for discharge, with expectation to continue OTC Tylenol for pain and discomfort and outpatient follow-up primary care provider within 1 week  Please see above list of diagnoses and related plan for additional information       Condition at Discharge: good    Discharge Day Visit / Exam:   Subjective:  No overnight events reported by nursing team  Patient seen at bedside endorse resolution of chest pain  Denies any shortness of breath, abdominal pain as well as no urinary symptoms  Vitals: Blood Pressure: 148/91 (12/02/22 1200)  Pulse: 72 (12/02/22 1200)  Temperature: 97 6 °F (36 4 °C) (12/02/22 0800)  Temp Source: Oral (12/02/22 0800)  Respirations: 16 (12/02/22 1200)  SpO2: 97 % (12/02/22 1200)  Exam:   Physical Exam  Vitals and nursing note reviewed  Constitutional:       General: He is not in acute distress  Appearance: He is well-developed  He is not ill-appearing  HENT:      Head: Normocephalic and atraumatic  Right Ear: External ear normal       Left Ear: External ear normal       Nose: Nose normal       Mouth/Throat:      Mouth: Mucous membranes are moist    Eyes:      General: No scleral icterus  Extraocular Movements: Extraocular movements intact  Conjunctiva/sclera: Conjunctivae normal       Pupils: Pupils are equal, round, and reactive to light  Cardiovascular:      Rate and Rhythm: Normal rate and regular rhythm  Pulses: Normal pulses  Heart sounds: Normal heart sounds  No murmur heard  Pulmonary:      Effort: Pulmonary effort is normal  No respiratory distress  Breath sounds: Normal breath sounds  Chest:      Chest wall: No tenderness  Comments: Reproducible discomfort  Abdominal:      General: Bowel sounds are normal  There is no distension  Palpations: Abdomen is soft  Tenderness: There is no abdominal tenderness  There is no right CVA tenderness or left CVA tenderness  Musculoskeletal:         General: No swelling  Cervical back: Neck supple  Right lower leg: No edema  Left lower leg: No edema  Skin:     General: Skin is warm and dry  Capillary Refill: Capillary refill takes less than 2 seconds  Neurological:      General: No focal deficit present  Mental Status: He is alert and oriented to person, place, and time     Psychiatric:         Mood and Affect: Mood normal           Discussion with Family: Updated  (wife) at bedside  Discharge instructions/Information to patient and family:   See after visit summary for information provided to patient and family  Provisions for Follow-Up Care:  See after visit summary for information related to follow-up care and any pertinent home health orders  Disposition:   Home    Planned Readmission:  None    Discharge Medications:  See after visit summary for reconciled discharge medications provided to patient and/or family        **Please Note: This note may have been constructed using a voice recognition system**

## 2022-12-02 NOTE — ASSESSMENT & PLAN NOTE
Lab Results   Component Value Date    HGBA1C 6 0 (H) 06/13/2022       Recent Labs     12/02/22  0842   POCGLU 111       Blood Sugar Average: Last 72 hrs:  (P) 111   Continue glimepiride at home    Follow-up with primary care provider within 1 week

## 2022-12-02 NOTE — ASSESSMENT & PLAN NOTE
Patient has a history PCI stenting of the LAD, and takes aspirin at home     -continue home aspirin 81 mg daily

## 2022-12-02 NOTE — ED NOTES
Patient ate full breakfast  Comfort and safety measures provided  Patient resting comfortably with wife at bedside           Rolo Mckeon RN  12/02/22 1007

## 2022-12-02 NOTE — ED PROVIDER NOTES
History  Chief Complaint   Patient presents with   • Chest Pain     Pt presents to the ED with c/o constant chest pain located over his pacemaker that radiated down his left arm that started last night  Pt received 325mg aspirin and 2 nitro by EMS, pain has since resolved  Patient is an 31-year-old male with history of type 2 diabetes, hypertension, sick sinus syndrome, presenting to the emergency room for evaluation  Patient states around 2:00 a m  this morning he was woken up out of his sleep with left-sided chest pressure radiating down his left arm  Patient states the pain was constant  He states his pain was relieved with nitroglycerin and aspirin that was given to him by EMS  Patient states he had a similar episode 1 week ago which resolved on its own  Patient denies any dizziness, headache, shortness of breath, abdominal pain, nausea, vomiting, diaphoresis, ripping or tearing sensation, radiation to the back, or any other symptoms at this time  History provided by:  Patient   used: No        Prior to Admission Medications   Prescriptions Last Dose Informant Patient Reported? Taking? Lancets (OneTouch Delica Plus [id number]) MISC 12/1/2022  No Yes   Sig: Use 1 application daily   Multiple Vitamins-Minerals (MULTIVITAMIN ADULT PO) 12/1/2022  Yes Yes   Sig: Take 1 tablet by mouth every other day  aspirin 81 MG tablet 12/1/2022  Yes Yes   Sig: Take 1 tablet by mouth daily Except sun    atorvastatin (LIPITOR) 10 mg tablet 12/1/2022  No Yes   Sig: TAKE 1 TABLET DAILY   cephalexin (KEFLEX) 500 mg capsule More than a month  Yes No   Sig: For dental visits   glimepiride (AMARYL) 1 mg tablet 12/1/2022  No Yes   Sig: Take 1 tablet (1 mg total) by mouth daily   glucose blood (ONE TOUCH ULTRA TEST) test strip 12/1/2022  No Yes   Sig: Patient tests once daily     leuprolide (Eligard) 22 5 mg Past Week  Yes Yes   Sig: Inject 22 5 mg under the skin every 3 (three) months   metoprolol tartrate (LOPRESSOR) 25 mg tablet 12/1/2022  No Yes   Sig: TAKE 2 TABLETS (50 MG) IN THE MORNING AND 1 TABLET IN THE EVENING   Patient taking differently: Take 25 mg by mouth daily   metoprolol tartrate (LOPRESSOR) 50 mg tablet Not Taking  No No   Sig: TAKE 1 TABLET DAILY IN THE MORNING   Patient not taking: Reported on 12/2/2022      Facility-Administered Medications: None       Past Medical History:   Diagnosis Date   • Basal cell carcinoma    • Benign polyp of large intestine    • Osteoarthritis of left hip     last assessed [id number]   • Piriformis syndrome of left side     last assessed [id number]       Past Surgical History:   Procedure Laterality Date   • CARDIAC PACEMAKER PLACEMENT  05/01/2005   • CATARACT EXTRACTION Left 10/01/1995   • CATARACT EXTRACTION Right 02/01/2000   • CORONARY ANGIOPLASTY  01/01/2010 2010   • CORONARY ANGIOPLASTY WITH STENT PLACEMENT  11/01/2002   • INGUINAL HERNIA REPAIR Right 01/01/1998   • INGUINAL HERNIA REPAIR Left 01/01/2009 2009   • LUMBAR LAMINECTOMY  01/01/2002 2002   • REPLACEMENT TOTAL KNEE Right 06/01/2010    right total knee replacement with complications   • RHIZOTOMY     • TONSILLECTOMY AND ADENOIDECTOMY  01/01/1937    1937   • TOTAL HIP ARTHROPLASTY Left 01/18/2017       Family History   Problem Relation Age of Onset   • Coronary artery disease Mother    • Diabetes Maternal Grandmother      I have reviewed and agree with the history as documented  E-Cigarette/Vaping     E-Cigarette/Vaping Substances     Social History     Tobacco Use   • Smoking status: Never   • Smokeless tobacco: Never   Substance Use Topics   • Alcohol use: No   • Drug use: No       Review of Systems   Constitutional: Negative for chills and fever  HENT: Negative for ear pain and sore throat  Eyes: Negative for pain and visual disturbance  Respiratory: Negative for cough and shortness of breath  Cardiovascular: Positive for chest pain  Negative for palpitations  Gastrointestinal: Negative for abdominal pain, diarrhea, nausea and vomiting  Genitourinary: Negative for dysuria and hematuria  Musculoskeletal: Negative for arthralgias and back pain  Skin: Negative for color change and rash  Neurological: Negative for seizures and syncope  All other systems reviewed and are negative  Physical Exam  Physical Exam  Vitals and nursing note reviewed  Constitutional:       General: He is not in acute distress  Appearance: He is well-developed  HENT:      Head: Normocephalic and atraumatic  Eyes:      Conjunctiva/sclera: Conjunctivae normal    Cardiovascular:      Rate and Rhythm: Normal rate and regular rhythm  Heart sounds: No murmur heard  Pulmonary:      Effort: Pulmonary effort is normal  No respiratory distress  Breath sounds: Normal breath sounds  Abdominal:      Palpations: Abdomen is soft  Tenderness: There is no abdominal tenderness  Musculoskeletal:         General: No swelling  Cervical back: Neck supple  Skin:     General: Skin is warm and dry  Capillary Refill: Capillary refill takes less than 2 seconds  Neurological:      Mental Status: He is alert     Psychiatric:         Mood and Affect: Mood normal          Vital Signs  ED Triage Vitals   Temperature Pulse Respirations Blood Pressure SpO2   12/02/22 0334 12/02/22 0334 12/02/22 0334 12/02/22 0334 12/02/22 0334   98 °F (36 7 °C) 78 16 135/84 96 %      Temp Source Heart Rate Source Patient Position - Orthostatic VS BP Location FiO2 (%)   12/02/22 0334 12/02/22 0334 12/02/22 0400 12/02/22 0334 --   Oral Monitor Lying Right arm       Pain Score       12/02/22 0334       No Pain           Vitals:    12/02/22 0334 12/02/22 0400 12/02/22 0500   BP: 135/84 119/66 139/74   Pulse: 78 62 66   Patient Position - Orthostatic VS:  Lying          Visual Acuity      ED Medications  Medications - No data to display    Diagnostic Studies  Results Reviewed     Procedure Component Value Units Date/Time    HS Troponin I 2hr [[id number]]  (Normal) Collected: 12/02/22 0533    Lab Status: Final result Specimen: Blood Updated: 12/02/22 0604     hs TnI 2hr 6 ng/L      Delta 2hr hsTnI 1 ng/L     B-Type Natriuretic Peptide(BNP), AN, CA, EA [facility] Only [[id number]]  (Normal) Collected: 12/02/22 0339    Lab Status: Final result Specimen: Blood from Arm, Left Updated: 12/02/22 0434     BNP 88 pg/mL     HS Troponin I 4hr [[id number]]     Lab Status: No result Specimen: Blood     HS Troponin 0hr (reflex protocol) [[id number]]  (Normal) Collected: 12/02/22 0339    Lab Status: Final result Specimen: Blood from Arm, Left Updated: 12/02/22 0426     hs TnI 0hr 5 ng/L     Comprehensive metabolic panel [[id number]]  (Abnormal) Collected: 12/02/22 0339    Lab Status: Final result Specimen: Blood from Arm, Left Updated: 12/02/22 0416     Sodium 138 mmol/L      Potassium 4 4 mmol/L      Chloride 106 mmol/L      CO2 25 mmol/L      ANION GAP 7 mmol/L      BUN 28 mg/dL      Creatinine 1 07 mg/dL      Glucose 116 mg/dL      Calcium 8 9 mg/dL      AST 17 U/L      ALT 12 U/L      Alkaline Phosphatase 49 U/L      Total Protein 6 5 g/dL      Albumin 3 8 g/dL      Total Bilirubin 0 36 mg/dL      eGFR 61 ml/min/1 73sq m     Narrative:      [place] guidelines for Chronic Kidney Disease (CKD):   •  Stage 1 with normal or high GFR (GFR > 90 mL/min/1 73 square meters)  •  Stage 2 Mild CKD (GFR = 60-89 mL/min/1 73 square meters)  •  Stage 3A Moderate CKD (GFR = 45-59 mL/min/1 73 square meters)  •  Stage 3B Moderate CKD (GFR = 30-44 mL/min/1 73 square meters)  •  Stage 4 Severe CKD (GFR = 15-29 mL/min/1 73 square meters)  •  Stage 5 End Stage CKD (GFR <15 mL/min/1 73 square meters)  Note: GFR calculation is accurate only with a steady state creatinine    Protime-INR [[id number]]  (Normal) Collected: 12/02/22 0339    Lab Status: Final result Specimen: Blood from Arm, Left Updated: 12/02/22 1527 Protime 14 4 seconds      INR 1 10    APTT [[id number]]  (Normal) Collected: 12/02/22 0339    Lab Status: Final result Specimen: Blood from Arm, Left Updated: 12/02/22 0408     PTT 29 seconds     CBC and differential [[id number]]  (Abnormal) Collected: 12/02/22 0339    Lab Status: Final result Specimen: Blood from Arm, Left Updated: 12/02/22 0348     WBC 5 60 Thousand/uL      RBC 3 89 Million/uL      Hemoglobin 11 8 g/dL      Hematocrit 36 4 %      MCV 94 fL      MCH 30 3 pg      MCHC 32 4 g/dL      RDW 13 0 %      MPV 10 5 fL      Platelets 519 Thousands/uL      nRBC 0 /100 WBCs      Neutrophils Relative 60 %      Immat GRANS % 0 %      Lymphocytes Relative 23 %      Monocytes Relative 9 %      Eosinophils Relative 7 %      Basophils Relative 1 %      Neutrophils Absolute 3 38 Thousands/µL      Immature Grans Absolute 0 01 Thousand/uL      Lymphocytes Absolute 1 29 Thousands/µL      Monocytes Absolute 0 51 Thousand/µL      Eosinophils Absolute 0 37 Thousand/µL      Basophils Absolute 0 04 Thousands/µL                  XR chest 1 view portable   ED Interpretation by Radha Taylor PA-C (12/02 3500)   No acute findings                  Procedures  ECG 12 Lead Documentation Only    Date/Time: 12/2/2022 6:34 AM  Performed by: Radha Taylor PA-C  Authorized by: Radha Taylor PA-C     ECG reviewed by me, the ED Provider: yes    Patient location:  ED  Rate:     ECG rate:  75    ECG rate assessment: normal    Rhythm:     Rhythm: paced    Ectopy:     Ectopy: none    Conduction:     Conduction: normal    ST segments:     ST segments:  Normal  T waves:     T waves: normal               HEART Risk Score    Flowsheet Row Most Recent Value   Heart Score Risk Calculator    History 2 Filed at: 12/02/2022 0442   ECG 1 Filed at: 12/02/2022 0442   Age 2 Filed at: 12/02/2022 0442   Risk Factors 2 Filed at: 12/02/2022 0442   Troponin 0 Filed at: 12/02/2022 0442   HEART Score 7 Filed at: 12/02/2022 6799 MDM  Number of Diagnoses or Management Options  Chest pain, unspecified type: new and requires workup     Amount and/or Complexity of Data Reviewed  Clinical lab tests: ordered and reviewed  Tests in the radiology section of CPT®: ordered and reviewed    Risk of Complications, Morbidity, and/or Mortality  Presenting problems: high  Diagnostic procedures: high  Management options: high    Patient Progress  Patient progress: stable      Disposition  Final diagnoses:   Chest pain, unspecified type     Time reflects when diagnosis was documented in both MDM as applicable and the Disposition within this note     Time User Action Codes Description Comment    12/2/2022  4:41 AM Mohsen Waterman Add [R07 9] Chest pain, unspecified type       ED Disposition     ED Disposition   Admit    Condition   Stable    Date/Time   Fri Dec 2, 2022  4:53 AM    Comment   Case was discussed with TABATHA and the patient's admission status was agreed to be Admission Status: observation status to the service of Dr Donnie Collins   Follow-up Information    None         Patient's Medications   Discharge Prescriptions    No medications on file       No discharge procedures on file      PDMP Review       Value Time User    PDMP Reviewed  Yes 12/2/2022  3:49 AM Chase Argueta MD          ED Provider  Electronically Signed by           Robbin Miller PA-C  12/02/22 9410

## 2022-12-02 NOTE — CONSULTS
Consultation - Cardiology Team One  Grant Montez  80 y o  male MRN: [id number]  Unit/Bed#: ED-25 Encounter: [id number]    Inpatient consult to Cardiology  Consult performed by: Nikita Gilmore PA-C  Consult ordered by: Billie Oviedo MD          Physician Requesting Consult: Karina Arroyo MD  Reason for Consult / Principal Problem:  Chest pain    Assessment:    1  Musculoskeletal chest pain:  Presents with an episode of left-sided chest pain with radiation to the left arm that awoke him from sleep  Pain was persistent and relieved with 2 SLN an aspirin  He had a similar episode 1 day prior that self resolved  Patient was active hanging [employer] decorations and pain is musculoskeletal and reproducible on exam   He also reports a separate heaviness/pressure in his chest EKG with biventricular paced rhythm  Hs troponin negative x3   2  CAD: History of PCI to the LAD in 11/2002  Maintained on aspirin, beta-blocker and statin  3  Preserved biventricular systolic function:  EF 80% with no WMA, G1DD, normal RV function, no significant valvular abnormality on echocardiogram 02/2018   4  SSS: s/p MDT DC PPM     · Device interrogation 10/18/2022: MDT DC PPM (NOT MRI conditional)  AP 96 9%,  91 6%, no significant high rate episodes, normal device function  5  Essential hypertension:  Average /76 on Lopressor 50 mg am/25 mg pm  6  Hyperlipidemia:  Lipid panel 06/2022 , TG 91, HDL 43, LDL 92 on atorvastatin 10 mg daily  7  Type 2 DM:  Hemoglobin A1c 6 0 and 06/2022    Management per primary team      Plan/Recommendations:  · Musculoskeletal chest pain  · Tylenol ordered  · Continue aspirin, beta-blocker and statin  · Patient stable for discharge from a cardiac standpoint  __________________________________________________________    CC:  Chest pain      History of Present Illness   HPI: Grant Montez  is a 80y o  year old male who has CAD with history of PCI to the LAD in 11/2002, SSS s/p MDT DC PPM in 2005,  essential hypertension, hyperlipidemia, type 2 DM who follows with cardiologist Dr Lynn Quinonez  Patient presented to the emergency room at [facility] on 12/02/2022 via EMS with complaints of chest pain  Patient awoke around 2:00 a m  With left-sided chest pressure with radiation to his left arm  He received sublingual nitro x 2 and aspirin by EMS with resolution of pain  He had a similar episode approximately 1 day prior that resolved on its own  On arrival to the ED patient's vital signs were stable with oxygen saturation 96% on room air  EKG revealed biventricular paced rhythm  CXR revealed no acute cardiopulmonary disease  Labs revealed stable CMP, HS troponin negative x3, BNP 88, stable CBC  Cardiology has been consulted for further evaluation management of chest pain  Home medication regimen includes aspirin 81 mg daily, Lopressor 50 mg am/25 mg pm, atorvastatin 10 mg daily  Patient resting in bed during consultation and has mild heaviness in his left chest that restarted about half an hour  Patient is very active ambulating stairs working around house and denies any exertional symptoms  He was very busy Wednesday making [employer] decorations and that evening had his 1st episode of left-sided chest pain that sounds very musculoskeletal at that was reproducible with movement and palpation  Last evening however his pain was more intense and felt pressure/heavy with radiation of pain down his left arm  He says he had immediate resolution of the pain after receiving nitro until it came back a little bit ago  He denies any palpitations, lightheadedness or dizziness  He denies any orthopnea, PND or lower extremity edema  Device interrogation 10/18/2022: MDT DC PPM (NOT MRI conditional)  AP 96 9%,  91 6%, no significant high rate episodes, normal device function      Echocardiogram 02/15/2018:  EF 55% with no WMA, G1DD, normal RV function, no significant valvular abnormality  EKG reviewed personally:  12/02/2022-biventricular paced rhythm at a rate of 78 beats per minute  No significant change when compared to the EKG from 09/27/2022  Telemetry reviewed personally:  Biventricular paced rhythm      Review of Systems   Constitutional: Negative  Negative for chills  Cardiovascular: Negative for chest pain, dyspnea on exertion, leg swelling, near-syncope, orthopnea, palpitations, paroxysmal nocturnal dyspnea and syncope  Respiratory: Negative  Negative for cough, shortness of breath and wheezing  Endocrine: Negative  Hematologic/Lymphatic: Negative  Skin: Negative  Musculoskeletal: Negative  Left sided chest pain    Gastrointestinal: Negative  Negative for diarrhea, nausea and vomiting  Neurological: Negative for dizziness, light-headedness and weakness  Psychiatric/Behavioral: Negative  Negative for altered mental status  All other systems reviewed and are negative      Historical Information   Past Medical History:   Diagnosis Date   • Basal cell carcinoma    • Benign polyp of large intestine    • Osteoarthritis of left hip     last assessed [id number]   • Piriformis syndrome of left side     last assessed [id number]     Past Surgical History:   Procedure Laterality Date   • CARDIAC PACEMAKER PLACEMENT  05/01/2005   • CATARACT EXTRACTION Left 10/01/1995   • CATARACT EXTRACTION Right 02/01/2000   • CORONARY ANGIOPLASTY  01/01/2010 2010   • CORONARY ANGIOPLASTY WITH STENT PLACEMENT  11/01/2002   • INGUINAL HERNIA REPAIR Right 01/01/1998   • INGUINAL HERNIA REPAIR Left 01/01/2009 2009   • LUMBAR LAMINECTOMY  01/01/2002 2002   • REPLACEMENT TOTAL KNEE Right 06/01/2010    right total knee replacement with complications   • RHIZOTOMY     • TONSILLECTOMY AND ADENOIDECTOMY  01/01/1937    1937   • TOTAL HIP ARTHROPLASTY Left 01/18/2017     Social History     Substance and Sexual Activity   Alcohol Use No     Social History Substance and Sexual Activity   Drug Use No     Social History     Tobacco Use   Smoking Status Never   Smokeless Tobacco Never     Family History:   Family History   Problem Relation Age of Onset   • Coronary artery disease Mother    • Diabetes Maternal Grandmother        Meds/Allergies   all current active meds have been reviewed, current meds:   Current Facility-Administered Medications   Medication Dose Route Frequency   • acetaminophen (TYLENOL) tablet 650 mg  650 mg Oral Q6H PRN   • atorvastatin (LIPITOR) tablet 10 mg  10 mg Oral Daily   • enoxaparin (LOVENOX) subcutaneous injection 40 mg  40 mg Subcutaneous Daily   • insulin lispro (HumaLOG) 100 units/mL subcutaneous injection 1-5 Units  1-5 Units Subcutaneous HS   • insulin lispro (HumaLOG) 100 units/mL subcutaneous injection 1-6 Units  1-6 Units Subcutaneous TID AC   • metoprolol tartrate (LOPRESSOR) tablet 25 mg  25 mg Oral HS   • metoprolol tartrate (LOPRESSOR) tablet 50 mg  50 mg Oral Daily   • multivitamin stress formula tablet 1 tablet  1 tablet Oral Daily    and PTA meds:   Prior to Admission Medications   Prescriptions Last Dose Informant Patient Reported? Taking? Lancets (OneTouch Delica Plus [id number]) MISC 12/1/2022  No Yes   Sig: Use 1 application daily   Multiple Vitamins-Minerals (MULTIVITAMIN ADULT PO) 12/1/2022  Yes Yes   Sig: Take 1 tablet by mouth every other day  aspirin 81 MG tablet 12/1/2022  Yes Yes   Sig: Take 1 tablet by mouth daily Except sun    atorvastatin (LIPITOR) 10 mg tablet 12/1/2022  No Yes   Sig: TAKE 1 TABLET DAILY   cephalexin (KEFLEX) 500 mg capsule More than a month  Yes No   Sig: For dental visits   glimepiride (AMARYL) 1 mg tablet 12/1/2022  No Yes   Sig: Take 1 tablet (1 mg total) by mouth daily   glucose blood (ONE TOUCH ULTRA TEST) test strip 12/1/2022  No Yes   Sig: Patient tests once daily     leuprolide (Eligard) 22 5 mg Past Week  Yes Yes   Sig: Inject 22 5 mg under the skin every 3 (three) months metoprolol tartrate (LOPRESSOR) 25 mg tablet 12/1/2022  No Yes   Sig: TAKE 2 TABLETS (50 MG) IN THE MORNING AND 1 TABLET IN THE EVENING   Patient taking differently: Take 25 mg by mouth daily   metoprolol tartrate (LOPRESSOR) 50 mg tablet Not Taking  No No   Sig: TAKE 1 TABLET DAILY IN THE MORNING   Patient not taking: Reported on 12/2/2022      Facility-Administered Medications: None          Allergies   Allergen Reactions   • Penicillins Rash   • Plavix [Clopidogrel] Rash       Objective   Vitals: Blood pressure 157/81, pulse 66, temperature 97 6 °F (36 4 °C), temperature source Oral, resp  rate 16, SpO2 97 %  ,     There is no height or weight on file to calculate BMI ,     Systolic (72QDR), GFK:301 , Min:119 , UWX:921     Diastolic (81YFJ), GND:99, Min:66, Max:84    Wt Readings from Last 3 Encounters:   09/26/22 75 3 kg (166 lb)   06/23/22 73 5 kg (162 lb 1 6 oz)   03/21/22 73 5 kg (162 lb)      Lab Results   Component Value Date    CREATININE 1 07 12/02/2022    CREATININE 1 14 09/27/2022    CREATININE 1 06 08/04/2022           No intake or output data in the 24 hours ending 12/02/22 0947  Weight (last 2 days)     None        Invasive Devices     Peripheral Intravenous Line  Duration           Peripheral IV 12/02/22 Left;Ventral (anterior) Forearm <1 day                  Physical Exam  Vitals and nursing note reviewed  Constitutional:       General: He is not in acute distress  Appearance: He is well-developed and well-nourished  Comments: On RA in NAD   HENT:      Head: Normocephalic and atraumatic  Neck:      Vascular: No JVD  Cardiovascular:      Rate and Rhythm: Normal rate and regular rhythm  Heart sounds: Normal heart sounds  No murmur heard  No friction rub  No gallop  Pulmonary:      Effort: Pulmonary effort is normal  No respiratory distress  Breath sounds: Normal breath sounds  No wheezing or rales  Chest:      Chest wall: No tenderness     Abdominal:      General: Bowel sounds are normal  There is no distension  Palpations: Abdomen is soft  Tenderness: There is no abdominal tenderness  Musculoskeletal:         General: No tenderness or edema  Normal range of motion  Cervical back: Normal range of motion and neck supple  Comments: Reproducible chest sided pain with palpation   Skin:     General: Skin is warm and dry  Coloration: Skin is not pale  Findings: No erythema  Neurological:      Mental Status: He is alert and oriented to person, place, and time  Psychiatric:         Mood and Affect: Mood and affect and mood normal          Behavior: Behavior normal          Thought Content:  Thought content normal          Judgment: Judgment normal            LABORATORY RESULTS:      CBC with diff:   Results from last 7 days   Lab Units 12/02/22  0339   WBC Thousand/uL 5 60   HEMOGLOBIN g/dL 11 8*   HEMATOCRIT % 36 4*   MCV fL 94   PLATELETS Thousands/uL 206   MCH pg 30 3   MCHC g/dL 32 4   RDW % 13 0   MPV fL 10 5   NRBC AUTO /100 WBCs 0       CMP:  Results from last 7 days   Lab Units 12/02/22  0339   POTASSIUM mmol/L 4 4   CHLORIDE mmol/L 106   CO2 mmol/L 25   BUN mg/dL 28*   CREATININE mg/dL 1 07   CALCIUM mg/dL 8 9   AST U/L 17   ALT U/L 12   ALK PHOS U/L 49   EGFR ml/min/1 73sq m 61       BMP:  Results from last 7 days   Lab Units 12/02/22  0339   POTASSIUM mmol/L 4 4   CHLORIDE mmol/L 106   CO2 mmol/L 25   BUN mg/dL 28*   CREATININE mg/dL 1 07   CALCIUM mg/dL 8 9          No results found for: NTBNP        Results from last 7 days   Lab Units 12/02/22  0339   INR  1 10     Lipid Profile:   Lab Results   Component Value Date    CHOL 135 02/20/2015    CHOL 134 01/17/2014     Lab Results   Component Value Date    HDL 43 06/13/2022    HDL 47 03/18/2022    HDL 46 03/25/2021     Lab Results   Component Value Date    LDLCALC 92 06/13/2022    LDLCALC 99 03/18/2022    LDLCALC 87 03/25/2021     Lab Results   Component Value Date    TRIG 91 06/13/2022    TRIG 68 2022    TRIG 113 2021         Cardiac testing:   Results for orders placed during the hospital encounter of 02/15/18    Echo complete with contrast if indicated    Narrative  [address], [address] [facility]  [phone]    Transthoracic Echocardiogram  2D, M-mode, Doppler, and Color Doppler    Study date:  15-Feb-2018    Patient: Neha Trevino  MR number: [id number]  Account number: [de-identified]  : [id number]  Age: 80 years  Gender: Male  Status: Outpatient  Location: [place] [address]   Height: 70 in  Weight: 159 9 lb  BP: 128/ 80 mmHg    Indications: Hyperlipidemia, Essential Hypertension, Nonrheumatic Mitral Valve Insufficiency, Atherosclerotic Heart Disease of native Coronary Artery without Angina Pectoris, Cardiac Pacemaker    Diagnoses: E78 5 - Hyperlipidemia, unspecified, I10  - Essential (primary) hypertension, I25 10 - Atherosclerotic heart disease of native coronary artery without angina pectoris    Sonographer:  Augustine Singletary [place]  Primary Physician:  Terrie Castorena MD  Referring Physician:  Hazel Harrison MD  Group:  [facility]  Interpreting Physician:  Kelvin Purdy MD    SUMMARY    LEFT VENTRICLE:  Systolic function was normal by visual assessment  Ejection fraction was estimated to be 55 %  There were no regional wall motion abnormalities  Doppler parameters were consistent with abnormal left ventricular relaxation (grade 1 diastolic dysfunction)  MITRAL VALVE:  There was mild regurgitation  AORTIC VALVE:  There was mild regurgitation  TRICUSPID VALVE:  There was trace regurgitation  PULMONIC VALVE:  There was trace regurgitation  HISTORY: PRIOR HISTORY: Arteriosclerosis of Coronary Artery, Hyperlipidemia, Hypertension, Mitral Regurgitation, Pacemaker    PROCEDURE: The study was performed in the [place] [address]  This was a routine study  The transthoracic approach was used   The study included complete 2D imaging, M-mode, complete spectral Doppler, and color Doppler  The heart rate was  88 bpm, at the start of the study  Images were obtained from the parasternal, apical, subcostal, and suprasternal notch acoustic windows  Echocardiographic views were limited due to poor acoustic window availability, decreased penetration,  and lung interference  This was a technically difficult study  LEFT VENTRICLE: Size was normal  Systolic function was normal by visual assessment  Ejection fraction was estimated to be 55 %  There were no regional wall motion abnormalities  Wall thickness was normal  DOPPLER: There was an increased  relative contribution of atrial contraction to ventricular filling  Doppler parameters were consistent with abnormal left ventricular relaxation (grade 1 diastolic dysfunction)  RIGHT VENTRICLE: The size was normal  Systolic function was normal  A pacing wire was present  DOPPLER: Systolic pressure was within the normal range  Estimated peak pressure was 30 mmHg  LEFT ATRIUM: Size was normal     RIGHT ATRIUM: Size was normal     MITRAL VALVE: Valve structure was normal  There was normal leaflet separation  DOPPLER: The transmitral velocity was within the normal range  There was no evidence for stenosis  There was mild regurgitation  AORTIC VALVE: The valve was trileaflet  Leaflets exhibited normal thickness and normal cuspal separation  DOPPLER: Transaortic velocity was within the normal range  There was no evidence for stenosis  There was mild regurgitation  TRICUSPID VALVE: The valve structure was normal  There was normal leaflet separation  DOPPLER: The transtricuspid velocity was within the normal range  There was no evidence for stenosis  There was trace regurgitation  PULMONIC VALVE: Leaflets exhibited normal thickness, no calcification, and normal cuspal separation  DOPPLER: The transpulmonic velocity was within the normal range   There was trace regurgitation  PERICARDIUM: There was no pericardial effusion  AORTA: The root exhibited normal size  SYSTEMIC VEINS: IVC: The inferior vena cava was normal in size and course  Respirophasic changes were normal     SYSTEM MEASUREMENT TABLES    2D  %FS: 34 93 %  EF(Teich): 66 39 %  IVSd: 1 4 cm  LA Diam: 3 1 cm  LVIDd: 2 56 cm  LVIDs: 1 66 cm  LVPWd: 1 35 cm    CW  TR MaxP 24 mmHg  TR Vmax: 2 3 m/s    MM  TAPSE: 2 17 cm    [facility]    Prepared and electronically signed by    Thiago Evans MD  Signed 15-Feb-2018 13:09:06    No results found for this or any previous visit  No valid procedures specified  No results found for this or any previous visit  Imaging: I have personally reviewed pertinent reports  XR chest 1 view portable    Result Date: 2022  Narrative: CHEST INDICATION:   chest pain  COMPARISON:  CXR 2022  EXAM PERFORMED/VIEWS:  XR CHEST PORTABLE FINDINGS: Normal heart size with left subclavian pacemaker leads in the right atrium and right ventricle  The lungs are clear  No pneumothorax or pleural effusion  Osseous structures appear within normal limits for patient age  Impression: No acute cardiopulmonary disease  Workstation performed: [id number]           Counseling / Coordination of Care  Total floor / unit time spent today 45 minutes  Greater than 50% of total time was spent with the patient and / or family counseling and / or coordination of care  A description of the counseling / coordination of care: Review of history, current assessment, development of a plan  Code Status: Level 1 - Full Code    ** Please Note: Dragon 360 Dictation voice to text software may have been used in the creation of this document   **

## 2022-12-02 NOTE — ED NOTES
Patient given menu and it, and diet, explained - all questions answered to Patient's satisfaction  State they will call number provided to order when they make their selection       Mike Ayoub RN  12/02/22 [id number]

## 2022-12-02 NOTE — ASSESSMENT & PLAN NOTE
Lab Results   Component Value Date    HGBA1C 6 0 (H) 06/13/2022       No results for input(s): POCGLU in the last 72 hours  Blood Sugar Average: Last 72 hrs:     Patient takes glimepiride at home     -ordered sliding scale insulin mealtime and bedtime    -hypoglycemia protocol  -blood glucose checks before meals and before bedtime

## 2022-12-05 ENCOUNTER — OFFICE VISIT (OUTPATIENT)
Dept: FAMILY MEDICINE CLINIC | Facility: CLINIC | Age: 87
End: 2022-12-05

## 2022-12-05 VITALS
BODY MASS INDEX: 23.34 KG/M2 | TEMPERATURE: 97.7 F | HEART RATE: 69 BPM | WEIGHT: 163 LBS | DIASTOLIC BLOOD PRESSURE: 92 MMHG | HEIGHT: 70 IN | RESPIRATION RATE: 17 BRPM | SYSTOLIC BLOOD PRESSURE: 144 MMHG | OXYGEN SATURATION: 100 %

## 2022-12-05 DIAGNOSIS — M25.512 CHRONIC LEFT SHOULDER PAIN: ICD-10-CM

## 2022-12-05 DIAGNOSIS — I49.5 SSS (SICK SINUS SYNDROME) (HCC): ICD-10-CM

## 2022-12-05 DIAGNOSIS — H02.9 EYELID ABNORMALITY: ICD-10-CM

## 2022-12-05 DIAGNOSIS — G89.29 CHRONIC LEFT SHOULDER PAIN: ICD-10-CM

## 2022-12-05 DIAGNOSIS — E11.9 TYPE 2 DIABETES MELLITUS WITHOUT COMPLICATION, WITHOUT LONG-TERM CURRENT USE OF INSULIN (HCC): ICD-10-CM

## 2022-12-05 DIAGNOSIS — I25.10 CORONARY ARTERY DISEASE INVOLVING NATIVE CORONARY ARTERY OF NATIVE HEART WITHOUT ANGINA PECTORIS: ICD-10-CM

## 2022-12-05 DIAGNOSIS — Z01.818 PREOPERATIVE CLEARANCE: Primary | ICD-10-CM

## 2022-12-05 DIAGNOSIS — D64.9 NORMOCYTIC ANEMIA: ICD-10-CM

## 2022-12-05 DIAGNOSIS — I35.1 NONRHEUMATIC AORTIC VALVE INSUFFICIENCY: ICD-10-CM

## 2022-12-05 DIAGNOSIS — R26.9 GAIT DISTURBANCE: ICD-10-CM

## 2022-12-05 DIAGNOSIS — I34.0 NONRHEUMATIC MITRAL VALVE REGURGITATION: ICD-10-CM

## 2022-12-05 PROBLEM — R07.9 CHEST PAIN: Status: RESOLVED | Noted: 2022-12-02 | Resolved: 2022-12-05

## 2022-12-05 NOTE — PROGRESS NOTES
Name: Kade Keys Sr       : 1934      MRN: [id number]  Encounter Provider: Tanmay Lopez MD  Encounter Date: 2022   Encounter department: [address] [place] [address]     1  Preoperative clearance    2  Eyelid abnormality    3  Type 2 diabetes mellitus without complication, without long-term current use of insulin ([facility] )    4  Normocytic anemia  -     Iron Panel (Includes Ferritin, Iron Sat%, Iron, and TIBC)  -     Reticulocytes  -     Protein electrophoresis, serum    5  Coronary artery disease involving native coronary artery of native heart without angina pectoris    6  SSS (sick sinus syndrome) ([facility] )    7  Nonrheumatic aortic valve insufficiency    8  Nonrheumatic mitral valve regurgitation    9  Chronic left shoulder pain    10  Gait disturbance  -     Vitamin B12  -     Folate    Medically cleared for eyelid surgery  Hold Glimepiride on the day of procedure  Take Metoprolol on the day of surgery  Additional labs ordered to evaluate anemia  L shoulder pain secondary OA/rotator cuff tendonitis  PRN Tylenol Arthritis  Consider PT/steroid injection for persistent symptoms  Subjective     80year old male here for pre operative clearance  Upper eyelid surgery for inverted eyelashes  Dr Ewa Blackmon  Date of procedure 2022  Medications reviewed Type 2 diabetes mellitus  On Glimepiride 1 mg daily  2022 A1c 6 0  2022  Urine microalbuminin 6 1 Not on ACE or ARB  No hypoglycemic events  No neuropathy symptoms  He is followed by Podiatry  Current with eye exam  Hypertension blood pressures have been stable on Metoprolol tartrate 50 mg in AM and 25 mg in PM  2022 Creatinine 1 07  GFR 61  Electrolytes normal   Hgb 11 8  Hyperlipidemia and CAD on Atorvastatin 10 mg daily lipid profile  2022 cholesterol 153  HDL 43  Triglycerides 91  LDL 92  2022 LFTs normal   2019 TSH 1 78  ER visit this month for atypical left sided chest pain/L shoulder pain   He ruled out for a MI  Persistent L shoulder pain worse at HS             Lab Results   Component Value Date    HGBA1C 6 0 (H) 06/13/2022       Recent Results (from the past 336 hour(s))   CBC and differential    Collection Time: 12/02/22  3:39 AM   Result Value Ref Range    WBC 5 60 4 31 - 10 16 Thousand/uL    RBC 3 89 3 88 - 5 62 Million/uL    Hemoglobin 11 8 (L) 12 0 - 17 0 g/dL    Hematocrit 36 4 (L) 36 5 - 49 3 %    MCV 94 82 - 98 fL    MCH 30 3 26 8 - 34 3 pg    MCHC 32 4 31 4 - 37 4 g/dL    RDW 13 0 11 6 - 15 1 %    MPV 10 5 8 9 - 12 7 fL    Platelets 194 500 - 429 Thousands/uL    nRBC 0 /100 WBCs    Neutrophils Relative 60 43 - 75 %    Immat GRANS % 0 0 - 2 %    Lymphocytes Relative 23 14 - 44 %    Monocytes Relative 9 4 - 12 %    Eosinophils Relative 7 (H) 0 - 6 %    Basophils Relative 1 0 - 1 %    Neutrophils Absolute 3 38 1 85 - 7 62 Thousands/µL    Immature Grans Absolute 0 01 0 00 - 0 20 Thousand/uL    Lymphocytes Absolute 1 29 0 60 - 4 47 Thousands/µL    Monocytes Absolute 0 51 0 17 - 1 22 Thousand/µL    Eosinophils Absolute 0 37 0 00 - 0 61 Thousand/µL    Basophils Absolute 0 04 0 00 - 0 10 Thousands/µL   Comprehensive metabolic panel    Collection Time: 12/02/22  3:39 AM   Result Value Ref Range    Sodium 138 135 - 147 mmol/L    Potassium 4 4 3 5 - 5 3 mmol/L    Chloride 106 96 - 108 mmol/L    CO2 25 21 - 32 mmol/L    ANION GAP 7 4 - 13 mmol/L    BUN 28 (H) 5 - 25 mg/dL    Creatinine 1 07 0 60 - 1 30 mg/dL    Glucose 116 65 - 140 mg/dL    Calcium 8 9 8 4 - 10 2 mg/dL    AST 17 13 - 39 U/L    ALT 12 7 - 52 U/L    Alkaline Phosphatase 49 34 - 104 U/L    Total Protein 6 5 6 4 - 8 4 g/dL    Albumin 3 8 3 5 - 5 0 g/dL    Total Bilirubin 0 36 0 20 - 1 00 mg/dL    eGFR 61 ml/min/1 73sq m   HS Troponin 0hr (reflex protocol)    Collection Time: 12/02/22  3:39 AM   Result Value Ref Range    hs TnI 0hr 5 "Refer to ACS Flowchart"- see link ng/L   Protime-INR    Collection Time: 12/02/22  3:39 AM   Result Value Ref Range Protime 14 4 11 6 - 14 5 seconds    INR 1 10 0 84 - 1 19   APTT    Collection Time: 12/02/22  3:39 AM   Result Value Ref Range    PTT 29 23 - 37 seconds   B-Type Natriuretic Peptide(BNP), AN, CA, EA Campuses Only    Collection Time: 12/02/22  3:39 AM   Result Value Ref Range    BNP 88 0 - 100 pg/mL   HS Troponin I 2hr    Collection Time: 12/02/22  5:33 AM   Result Value Ref Range    hs TnI 2hr 6 "Refer to ACS Flowchart"- see link ng/L    Delta 2hr hsTnI 1 <20 ng/L   ECG 12 lead    Collection Time: 12/02/22  8:16 AM   Result Value Ref Range    Ventricular Rate 78 BPM    Atrial Rate 78 BPM    IA Interval 178 ms    QRSD Interval 174 ms    QT Interval 456 ms    QTC Interval 519 ms    P Axis  degrees    QRS Axis -64 degrees    T [place] 86 degrees   HS Troponin I 4hr    Collection Time: 12/02/22  8:20 AM   Result Value Ref Range    hs TnI 4hr 5 "Refer to ACS Flowchart"- see link ng/L    Delta 4hr hsTnI 0 <20 ng/L   UA w Reflex to Microscopic w Reflex to Culture    Collection Time: 12/02/22  8:39 AM    Specimen: Urine, Other   Result Value Ref Range    Color, UA Colorless     Clarity, UA Clear     Specific [place], UA 1 016 1 003 - 1 030    pH, UA 6 0 4 5, 5 0, 5 5, 6 0, 6 5, 7 0, 7 5, 8 0    Leukocytes, UA Negative Negative    Nitrite, UA Negative Negative    Protein, UA Negative Negative mg/dl    Glucose, UA Negative Negative mg/dl    Ketones, UA Negative Negative mg/dl    Urobilinogen, UA <2 0 <2 0 mg/dl mg/dl    Bilirubin, UA Negative Negative    Occult Blood, UA Small (A) Negative   Urine Microscopic    Collection Time: 12/02/22  8:39 AM   Result Value Ref Range    RBC, UA 10-20 (A) None Seen, 1-2 /hpf    WBC, UA 1-2 None Seen, 1-2 /hpf    Epithelial Cells None Seen None Seen, Occasional /hpf    Bacteria, UA None Seen None Seen, Occasional /hpf   Fingerstick Glucose (POCT)    Collection Time: 12/02/22  8:42 AM   Result Value Ref Range    POC Glucose 111 65 - 140 mg/dl             Review of Systems Constitutional: Negative for appetite change, chills, fever and unexpected weight change  HENT: Negative for congestion, ear pain, rhinorrhea, sore throat and trouble swallowing  Eyes: Negative for visual disturbance  Respiratory: Negative for cough, shortness of breath and wheezing  Cardiovascular: Negative for chest pain, palpitations and leg swelling  CAD s/p stents  S/p pacemaker  02/2018  Echocardiogram normal left ventricular systolic function  EF 55%  No regional wall motion abnormalities  Grade 1 diastolic dysfunction  mild mitral regurgitation  Mild aortic regurgitation  Trace TR/AL  No pericardial effusion  Aortic root normal size  Gastrointestinal: Negative for abdominal pain, blood in stool, constipation, diarrhea, nausea and vomiting  Colonoscopy 04/2021   Endocrine: Negative for polydipsia and polyuria  Genitourinary: Negative for difficulty urinating  Prostate CA s/p XRT 2006  He is followed by Urology  He has been receiving ADT injections for elevated PSA     Lab Results       Component                Value               Date                       PSA                      <0 1                08/04/2022                 PSA                      <0 1                04/26/2022                 PSA                      <0 1                01/12/2022                                  Musculoskeletal: Negative for arthralgias, gait problem and myalgias  OA S/p left THR 01/2017  Skin: Negative for rash  History of BCCs/SCCs followed by Dermatology   Allergic/Immunologic: Negative for environmental allergies  Neurological: Negative for dizziness and headaches  Hematological: Negative for adenopathy  Does not bruise/bleed easily          Lab Results       Component                Value               Date                       WBC                      5 60                12/02/2022                 HGB                      11 8 (L) 12/02/2022                 HCT                      36 4 (L)            12/02/2022                 MCV                      94                  12/02/2022                 PLT                      206                 12/02/2022              Hemoglobin       Date                     Value               Ref Range           Status                12/02/2022               11 8 (L)            12 0 - 17 0 g/*     Final                 09/27/2022               11 2 (L)            12 0 - 17 0 g/*     Final                 08/04/2022               12 4                12 0 - 17 0 g/*     Final                 12/22/2015               13 3                12 0 - 17 0 g/*     Final                 02/20/2015               13 2                12 0 - 17 0 g/*     Final                 11/22/2014               14 3                12 0 - 17 0 g/*     Final            ----------     Psychiatric/Behavioral: Negative for dysphoric mood and sleep disturbance         Past Medical History:   Diagnosis Date   • Basal cell carcinoma    • Benign polyp of large intestine    • Cancer (HCC)    • Diabetes mellitus ([facility] ) 2010   • Osteoarthritis of left hip     last assessed [id number]   • Piriformis syndrome of left side     last assessed [id number]     Past Surgical History:   Procedure Laterality Date   • APPENDECTOMY  1966   • CARDIAC PACEMAKER PLACEMENT  05/01/2005   • CATARACT EXTRACTION Left 10/01/1995   • CATARACT EXTRACTION Right 02/01/2000   • CORONARY ANGIOPLASTY  01/01/2010 2010   • CORONARY ANGIOPLASTY WITH STENT PLACEMENT  11/01/2002   • EYE SURGERY  Cataract  2000 & 2002   • INGUINAL HERNIA REPAIR Right 01/01/1998   • INGUINAL HERNIA REPAIR Left 01/01/2009 2009   • JOINT REPLACEMENT  2010 &  2017   • LUMBAR LAMINECTOMY  01/01/2002 2002   • REPLACEMENT TOTAL KNEE Right 06/01/2010    right total knee replacement with complications   • RHIZOTOMY     • TONSILLECTOMY AND ADENOIDECTOMY  01/01/1937    1937   • TOTAL HIP ARTHROPLASTY Left 01/18/2017     Family History   Problem Relation Age of Onset   • Coronary artery disease Mother    • Diabetes Maternal Grandmother      Social History     Socioeconomic History   • Marital status: /Civil Union     Spouse name: None   • Number of children: None   • Years of education: None   • Highest education level: None   Occupational History   • None   Tobacco Use   • Smoking status: Never   • Smokeless tobacco: Never   Substance and Sexual Activity   • Alcohol use: No   • Drug use: Never   • Sexual activity: Not Currently     Partners: Female     Birth control/protection: None   Other Topics Concern   • None   Social History Narrative   • None     Social Determinants of Health     Financial Resource Strain: Not on file   Food Insecurity: Not on file   Transportation Needs: Not on file   Physical Activity: Not on file   Stress: Not on file   Social Connections: Not on file   Intimate Partner Violence: Not on file   Housing Stability: Not on file     Current Outpatient Medications on File Prior to Visit   Medication Sig   • aspirin 81 MG tablet Take 1 tablet by mouth daily Except sun    • atorvastatin (LIPITOR) 10 mg tablet TAKE 1 TABLET DAILY   • cephalexin (KEFLEX) 500 mg capsule For dental visits   • glimepiride (AMARYL) 1 mg tablet Take 1 tablet (1 mg total) by mouth daily   • glucose blood (ONE TOUCH ULTRA TEST) test strip Patient tests once daily  • Lancets (OneTouch Delica Plus [id number]) MISC Use 1 application daily   • leuprolide (Eligard) 22 5 mg Inject 22 5 mg under the skin every 3 (three) months   • metoprolol tartrate (LOPRESSOR) 25 mg tablet TAKE 2 TABLETS (50 MG) IN THE MORNING AND 1 TABLET IN THE EVENING (Patient taking differently: Take 25 mg by mouth daily)   • metoprolol tartrate (LOPRESSOR) 50 mg tablet TAKE 1 TABLET DAILY IN THE MORNING   • Multiple Vitamins-Minerals (MULTIVITAMIN ADULT PO) Take 1 tablet by mouth every other day       Allergies   Allergen Reactions   • Penicillins Rash   • Plavix [Clopidogrel] Rash     Immunization History   Administered Date(s) Administered   • COVID-19 MODERNA VACC 0 5 ML IM 01/19/2021, 02/16/2021, 10/21/2021   • COVID-19 Moderna Vac BIVALENT 18 Yr+ Im (BOOSTER ONLY) 09/10/2022   • COVID-19 Moderna vac 6-11y or adult booster 50 mcg/0 5 mL 04/13/2022   • Influenza Split High Dose Preservative Free IM 12/04/2012, 12/13/2013, 10/01/2014, 09/24/2015, 11/09/2016, 11/02/2017   • Influenza, high dose seasonal 0 7 mL 10/26/2018, 10/08/2019, 10/14/2020, 11/17/2021, 09/26/2022   • Pneumococcal Conjugate 13-Valent 02/23/2016   • Pneumococcal Polysaccharide PPV23 12/13/2011   • Zoster 05/30/2013   • Zoster Vaccine Recombinant 06/12/2019, 08/15/2019       Objective     /92 (BP Location: Left arm, Patient Position: Sitting, Cuff Size: Adult)   Pulse 69   Temp 97 7 °F (36 5 °C) (Temporal)   Resp 17   Ht 5' 10" (1 778 m)   Wt 73 9 kg (163 lb)   SpO2 100%   BMI 23 39 kg/m²     Physical Exam  Vitals and nursing note reviewed  Constitutional:       General: He is not in acute distress  Appearance: He is well-developed  HENT:      Right Ear: Tympanic membrane and ear canal normal       Left Ear: Tympanic membrane and ear canal normal    Eyes:      General: No scleral icterus  Extraocular Movements: Extraocular movements intact  Conjunctiva/sclera: Conjunctivae normal       Pupils: Pupils are equal, round, and reactive to light  Neck:      Thyroid: No thyroid mass or thyromegaly  Vascular: No carotid bruit or JVD  Trachea: No tracheal deviation  Cardiovascular:      Rate and Rhythm: Normal rate and regular rhythm  Pulses:           Carotid pulses are 2+ on the right side and 2+ on the left side  Heart sounds: Normal heart sounds  No murmur heard  No gallop  Pulmonary:      Effort: Pulmonary effort is normal  No respiratory distress  Breath sounds: Normal breath sounds  No wheezing or rales     Abdominal: Palpations: There is no hepatomegaly  Musculoskeletal:      Right lower leg: No edema  Left lower leg: No edema  Comments: Inspection L shoulder normal  No effusion  No warmth or redness  No tenderness  + crepitus  Full ROM with abduction, internal and external rotation  Negative cross arm test  Negative Speed test  Negative Yergason sign  +/- impingement sign  Negative empty can sign  Negative sulcus sign  Negative Magoffin   Lymphadenopathy:      Cervical: No cervical adenopathy  Upper Body:      Right upper body: No supraclavicular adenopathy  Left upper body: No supraclavicular adenopathy  Skin:     Findings: No rash  Nails: There is no clubbing  Neurological:      General: No focal deficit present  Mental Status: He is alert and oriented to person, place, and time     Psychiatric:         Mood and Affect: Mood normal          Behavior: Behavior normal        Dionte John MD

## 2023-01-01 DIAGNOSIS — I25.10 CORONARY ARTERIOSCLEROSIS: ICD-10-CM

## 2023-01-05 ENCOUNTER — APPOINTMENT (OUTPATIENT)
Dept: LAB | Facility: CLINIC | Age: 88
End: 2023-01-05

## 2023-01-05 DIAGNOSIS — C61 PROSTATE CANCER (HCC): ICD-10-CM

## 2023-01-05 LAB
BASOPHILS # BLD AUTO: 0.03 THOUSANDS/ÂΜL (ref 0–0.1)
BASOPHILS NFR BLD AUTO: 1 % (ref 0–1)
EOSINOPHIL # BLD AUTO: 0.28 THOUSAND/ÂΜL (ref 0–0.61)
EOSINOPHIL NFR BLD AUTO: 6 % (ref 0–6)
ERYTHROCYTE [DISTWIDTH] IN BLOOD BY AUTOMATED COUNT: 13.2 % (ref 11.6–15.1)
HCT VFR BLD AUTO: 39.1 % (ref 36.5–49.3)
HGB BLD-MCNC: 12.5 G/DL (ref 12–17)
IMM GRANULOCYTES # BLD AUTO: 0.01 THOUSAND/UL (ref 0–0.2)
IMM GRANULOCYTES NFR BLD AUTO: 0 % (ref 0–2)
LYMPHOCYTES # BLD AUTO: 0.81 THOUSANDS/ÂΜL (ref 0.6–4.47)
LYMPHOCYTES NFR BLD AUTO: 18 % (ref 14–44)
MCH RBC QN AUTO: 30 PG (ref 26.8–34.3)
MCHC RBC AUTO-ENTMCNC: 32 G/DL (ref 31.4–37.4)
MCV RBC AUTO: 94 FL (ref 82–98)
MONOCYTES # BLD AUTO: 0.45 THOUSAND/ÂΜL (ref 0.17–1.22)
MONOCYTES NFR BLD AUTO: 10 % (ref 4–12)
NEUTROPHILS # BLD AUTO: 2.84 THOUSANDS/ÂΜL (ref 1.85–7.62)
NEUTS SEG NFR BLD AUTO: 65 % (ref 43–75)
NRBC BLD AUTO-RTO: 0 /100 WBCS
PLATELET # BLD AUTO: 202 THOUSANDS/UL (ref 149–390)
PMV BLD AUTO: 10.8 FL (ref 8.9–12.7)
PSA SERPL-MCNC: <0.1 NG/ML (ref 0–4)
RBC # BLD AUTO: 4.16 MILLION/UL (ref 3.88–5.62)
RETICS # AUTO: NORMAL 10*3/UL (ref 14356–105094)
RETICS # CALC: 1.62 % (ref 0.37–1.87)
WBC # BLD AUTO: 4.42 THOUSAND/UL (ref 4.31–10.16)

## 2023-01-06 LAB
ALBUMIN SERPL BCP-MCNC: 3.8 G/DL (ref 3.5–5)
ALBUMIN SERPL ELPH-MCNC: 4.11 G/DL (ref 3.5–5)
ALBUMIN SERPL ELPH-MCNC: 60.4 % (ref 52–65)
ALP SERPL-CCNC: 62 U/L (ref 46–116)
ALPHA1 GLOB SERPL ELPH-MCNC: 0.25 G/DL (ref 0.1–0.4)
ALPHA1 GLOB SERPL ELPH-MCNC: 3.7 % (ref 2.5–5)
ALPHA2 GLOB SERPL ELPH-MCNC: 0.63 G/DL (ref 0.4–1.2)
ALPHA2 GLOB SERPL ELPH-MCNC: 9.2 % (ref 7–13)
ALT SERPL W P-5'-P-CCNC: 24 U/L (ref 12–78)
AST SERPL W P-5'-P-CCNC: 22 U/L (ref 5–45)
BETA GLOB ABNORMAL SERPL ELPH-MCNC: 0.35 G/DL (ref 0.4–0.8)
BETA1 GLOB SERPL ELPH-MCNC: 5.2 % (ref 5–13)
BETA2 GLOB SERPL ELPH-MCNC: 5 % (ref 2–8)
BETA2+GAMMA GLOB SERPL ELPH-MCNC: 0.34 G/DL (ref 0.2–0.5)
BILIRUB DIRECT SERPL-MCNC: 0.18 MG/DL (ref 0–0.2)
BILIRUB SERPL-MCNC: 0.61 MG/DL (ref 0.2–1)
BUN SERPL-MCNC: 24 MG/DL (ref 5–25)
CALCIUM SERPL-MCNC: 9.4 MG/DL (ref 8.3–10.1)
CREAT SERPL-MCNC: 0.88 MG/DL (ref 0.6–1.3)
FERRITIN SERPL-MCNC: 115 NG/ML (ref 8–388)
FOLATE SERPL-MCNC: >20 NG/ML (ref 3.1–17.5)
GAMMA GLOB ABNORMAL SERPL ELPH-MCNC: 1.12 G/DL (ref 0.5–1.6)
GAMMA GLOB SERPL ELPH-MCNC: 16.5 % (ref 12–22)
GFR SERPL CREATININE-BSD FRML MDRD: 76 ML/MIN/1.73SQ M
IGG/ALB SER: 1.53 {RATIO} (ref 1.1–1.8)
IRON SATN MFR SERPL: 28 % (ref 20–50)
IRON SERPL-MCNC: 76 UG/DL (ref 65–175)
PROT PATTERN SERPL ELPH-IMP: ABNORMAL
PROT SERPL-MCNC: 6.8 G/DL (ref 6.4–8.2)
PROT SERPL-MCNC: 7.5 G/DL (ref 6.4–8.4)
TIBC SERPL-MCNC: 268 UG/DL (ref 250–450)
VIT B12 SERPL-MCNC: 544 PG/ML (ref 100–900)

## 2023-01-17 ENCOUNTER — REMOTE DEVICE CLINIC VISIT (OUTPATIENT)
Dept: CARDIOLOGY CLINIC | Facility: CLINIC | Age: 88
End: 2023-01-17

## 2023-01-17 DIAGNOSIS — Z95.0 PRESENCE OF PERMANENT CARDIAC PACEMAKER: Primary | ICD-10-CM

## 2023-01-31 NOTE — PROGRESS NOTES
Cardiology Follow Up    Loren December    1934  [id number]  [address] [facility] [place]  [place] [address] [phone]    1  Essential (primary) hypertension        2  Pure hypercholesterolemia        3  Coronary arteriosclerosis        4  Presence of cardiac pacemaker        5  Non-rheumatic mitral regurgitation            Interval History:  Patient is here for a follow-up visit   Most recent Medtronic pacer PPM 1/2023 demonstrated an appropriately functioning device with no significant arrhythmia noted  He has CAD with PCI of the LAD   Echocardiogram done February 2018 demonstrated preserved LV systolic function and no significant valvular heart disease   Patient had a lipid profile done 2/2023 which demonstrated total cholesterol of 166 with an HDL of 46 and a calculated LDL of 99   He is on atorvastatin   He has prostate cancer and has ongoing follow-up with urology  Patient has no chest pain or significant dyspnea  His vital signs are stable today   As usual his wife is here today  He went to the [address] [place] [address] December 2022 with left shoulder discomfort  He was concerned about a cardiac etiology  This was excluded and it was felt that he had a musculoskeletal problem  This has improved subsequently  He now has a great grandchild named Dara Greco and is expecting a great grandson in July      Patient Active Problem List   Diagnosis   • Aortic valve regurgitation   • CAD (coronary artery disease)   • Carpal tunnel syndrome   • Disc degeneration, lumbar   • Facet arthritis of lumbar region   • Glaucoma   • Hyperlipidemia   • Hypertension   • Lumbar canal stenosis   • Mitral regurgitation   • Prostate cancer (HCC)   • Type 2 diabetes mellitus (HCC)   • SSS (sick sinus syndrome) ([facility] )     Past Medical History:   Diagnosis Date   • Basal cell carcinoma    • Benign polyp of large intestine    • Cancer (HCC)    • Diabetes mellitus ([facility] [address] ) 2010   • Osteoarthritis of left hip     last assessed [id number]   • Piriformis syndrome of left side     last assessed [id number]     Social History     Socioeconomic History   • Marital status: /Civil Union     Spouse name: Not on file   • Number of children: Not on file   • Years of education: Not on file   • Highest education level: Not on file   Occupational History   • Not on file   Tobacco Use   • Smoking status: Never   • Smokeless tobacco: Never   Substance and Sexual Activity   • Alcohol use: No   • Drug use: Never   • Sexual activity: Not Currently     Partners: Female     Birth control/protection: None   Other Topics Concern   • Not on file   Social History Narrative   • Not on file     Social Determinants of Health     Financial Resource Strain: Not on file   Food Insecurity: Not on file   Transportation Needs: Not on file   Physical Activity: Not on file   Stress: Not on file   Social Connections: Not on file   Intimate Partner Violence: Not on file   Housing Stability: Not on file      Family History   Problem Relation Age of Onset   • Coronary artery disease Mother    • Diabetes Maternal Grandmother      Past Surgical History:   Procedure Laterality Date   • APPENDECTOMY  1966   • CARDIAC PACEMAKER PLACEMENT  05/01/2005   • CATARACT EXTRACTION Left 10/01/1995   • CATARACT EXTRACTION Right 02/01/2000   • CORONARY ANGIOPLASTY  01/01/2010 2010   • CORONARY ANGIOPLASTY WITH STENT PLACEMENT  11/01/2002   • EYE SURGERY  Cataract  2000 & 2002   • INGUINAL HERNIA REPAIR Right 01/01/1998   • INGUINAL HERNIA REPAIR Left 01/01/2009 2009   • JOINT REPLACEMENT  2010 &  2017   • LUMBAR LAMINECTOMY  01/01/2002 2002   • REPLACEMENT TOTAL KNEE Right 06/01/2010    right total knee replacement with complications   • RHIZOTOMY     • TONSILLECTOMY AND ADENOIDECTOMY  01/01/1937    1937   • TOTAL HIP ARTHROPLASTY Left 01/18/2017       Current Outpatient Medications:   •  aspirin 81 MG tablet, Take 1 tablet by mouth daily Except sun , Disp: , Rfl:   •  atorvastatin (LIPITOR) 10 mg tablet, TAKE 1 TABLET DAILY, Disp: 90 tablet, Rfl: 3  •  cephalexin (KEFLEX) 500 mg capsule, For dental visits, Disp: , Rfl: 0  •  glimepiride (AMARYL) 1 mg tablet, Take 1 tablet (1 mg total) by mouth daily, Disp: 90 tablet, Rfl: 3  •  glucose blood (ONE TOUCH ULTRA TEST) test strip, Patient tests once daily  , Disp: 100 each, Rfl: 3  •  Lancets (OneTouch Delica Plus [id number]) MISC, Use 1 application daily, Disp: 100 each, Rfl: 1  •  leuprolide (Eligard) 22 5 mg, Inject 22 5 mg under the skin every 3 (three) months, Disp: , Rfl:   •  metoprolol tartrate (LOPRESSOR) 25 mg tablet, TAKE 2 TABLETS (50 MG) IN THE MORNING AND 1 TABLET IN THE EVENING, Disp: 90 tablet, Rfl: 11  •  metoprolol tartrate (LOPRESSOR) 50 mg tablet, TAKE 1 TABLET DAILY IN THE MORNING, Disp: 90 tablet, Rfl: 3  •  Multiple Vitamins-Minerals (MULTIVITAMIN ADULT PO), Take 1 tablet by mouth every other day , Disp: , Rfl:   Allergies   Allergen Reactions   • Penicillins Rash   • Plavix [Clopidogrel] Rash       Labs:not applicable  Imaging: Cardiac EP device report    Result Date: 1/17/2023  Narrative: MDT DUAL PPM - NOT MRI CONDITIONAL CARELINK TRANSMISSION: BATTERY VOLTAGE ADEQUATE  (6 YRS) AP 98%  95%  ALL AVAILABLE LEAD PARAMETERS WITHIN NORMAL LIMITS  NO SIGNIFICANT HIGH RATE EPISODES  NORMAL DEVICE FUNCTION  ---CELIS       Review of Systems:  Review of Systems   All other systems reviewed and are negative  Physical Exam:  /78 (BP Location: Left arm, Patient Position: Sitting, Cuff Size: Standard)   Pulse 70   Ht 5' 10" (1 778 m) Comment: verbal  Wt 73 5 kg (162 lb)   SpO2 98%   BMI 23 24 kg/m²   Physical Exam  Vitals reviewed  Constitutional:       Appearance: He is well-developed  HENT:      Head: Normocephalic and atraumatic  Cardiovascular:      Rate and Rhythm: Normal rate  Heart sounds: Normal heart sounds     Pulmonary: Effort: Pulmonary effort is normal       Breath sounds: Normal breath sounds  Musculoskeletal:      Cervical back: Normal range of motion  Skin:     General: Skin is warm and dry  Neurological:      Mental Status: He is alert and oriented to person, place, and time  Discussion/Summary:I will continue the patient's present medical regimen  The patient appears well compensated  I have asked the patient to call if there is a problem in the interim otherwise I will see the patient in six months time

## 2023-02-08 ENCOUNTER — APPOINTMENT (OUTPATIENT)
Dept: LAB | Facility: CLINIC | Age: 88
End: 2023-02-08

## 2023-02-08 DIAGNOSIS — C61 PROSTATE CANCER (HCC): ICD-10-CM

## 2023-02-08 LAB
ALBUMIN SERPL BCP-MCNC: 3.9 G/DL (ref 3.5–5)
ALP SERPL-CCNC: 60 U/L (ref 46–116)
ALT SERPL W P-5'-P-CCNC: 23 U/L (ref 12–78)
ANION GAP SERPL CALCULATED.3IONS-SCNC: 4 MMOL/L (ref 4–13)
AST SERPL W P-5'-P-CCNC: 18 U/L (ref 5–45)
BASOPHILS # BLD AUTO: 0.02 THOUSANDS/ÂΜL (ref 0–0.1)
BASOPHILS NFR BLD AUTO: 0 % (ref 0–1)
BILIRUB SERPL-MCNC: 0.57 MG/DL (ref 0.2–1)
BUN SERPL-MCNC: 23 MG/DL (ref 5–25)
CALCIUM SERPL-MCNC: 9.6 MG/DL (ref 8.3–10.1)
CHLORIDE SERPL-SCNC: 108 MMOL/L (ref 96–108)
CHOLEST SERPL-MCNC: 166 MG/DL
CO2 SERPL-SCNC: 28 MMOL/L (ref 21–32)
CREAT SERPL-MCNC: 0.97 MG/DL (ref 0.6–1.3)
CREAT UR-MCNC: 112 MG/DL
EOSINOPHIL # BLD AUTO: 0.3 THOUSAND/ÂΜL (ref 0–0.61)
EOSINOPHIL NFR BLD AUTO: 6 % (ref 0–6)
ERYTHROCYTE [DISTWIDTH] IN BLOOD BY AUTOMATED COUNT: 13.1 % (ref 11.6–15.1)
GFR SERPL CREATININE-BSD FRML MDRD: 69 ML/MIN/1.73SQ M
GLUCOSE P FAST SERPL-MCNC: 109 MG/DL (ref 65–99)
HCT VFR BLD AUTO: 38.6 % (ref 36.5–49.3)
HDLC SERPL-MCNC: 46 MG/DL
HGB BLD-MCNC: 12.5 G/DL (ref 12–17)
IMM GRANULOCYTES # BLD AUTO: 0.01 THOUSAND/UL (ref 0–0.2)
IMM GRANULOCYTES NFR BLD AUTO: 0 % (ref 0–2)
LDLC SERPL CALC-MCNC: 99 MG/DL (ref 0–100)
LYMPHOCYTES # BLD AUTO: 0.9 THOUSANDS/ÂΜL (ref 0.6–4.47)
LYMPHOCYTES NFR BLD AUTO: 18 % (ref 14–44)
MCH RBC QN AUTO: 30.3 PG (ref 26.8–34.3)
MCHC RBC AUTO-ENTMCNC: 32.4 G/DL (ref 31.4–37.4)
MCV RBC AUTO: 94 FL (ref 82–98)
MICROALBUMIN UR-MCNC: 7 MG/L (ref 0–20)
MICROALBUMIN/CREAT 24H UR: 6 MG/G CREATININE (ref 0–30)
MONOCYTES # BLD AUTO: 0.46 THOUSAND/ÂΜL (ref 0.17–1.22)
MONOCYTES NFR BLD AUTO: 9 % (ref 4–12)
NEUTROPHILS # BLD AUTO: 3.2 THOUSANDS/ÂΜL (ref 1.85–7.62)
NEUTS SEG NFR BLD AUTO: 67 % (ref 43–75)
NONHDLC SERPL-MCNC: 120 MG/DL
NRBC BLD AUTO-RTO: 0 /100 WBCS
PLATELET # BLD AUTO: 207 THOUSANDS/UL (ref 149–390)
PMV BLD AUTO: 11.1 FL (ref 8.9–12.7)
POTASSIUM SERPL-SCNC: 4.1 MMOL/L (ref 3.5–5.3)
PROT SERPL-MCNC: 7.1 G/DL (ref 6.4–8.4)
PSA SERPL-MCNC: <0.1 NG/ML (ref 0–4)
RBC # BLD AUTO: 4.12 MILLION/UL (ref 3.88–5.62)
SODIUM SERPL-SCNC: 140 MMOL/L (ref 135–147)
TRIGL SERPL-MCNC: 106 MG/DL
WBC # BLD AUTO: 4.89 THOUSAND/UL (ref 4.31–10.16)

## 2023-02-09 ENCOUNTER — OFFICE VISIT (OUTPATIENT)
Dept: CARDIOLOGY CLINIC | Facility: CLINIC | Age: 88
End: 2023-02-09

## 2023-02-09 VITALS
SYSTOLIC BLOOD PRESSURE: 138 MMHG | BODY MASS INDEX: 23.19 KG/M2 | HEART RATE: 70 BPM | HEIGHT: 70 IN | WEIGHT: 162 LBS | OXYGEN SATURATION: 98 % | DIASTOLIC BLOOD PRESSURE: 78 MMHG

## 2023-02-09 DIAGNOSIS — Z95.0 PRESENCE OF CARDIAC PACEMAKER: ICD-10-CM

## 2023-02-09 DIAGNOSIS — E78.00 PURE HYPERCHOLESTEROLEMIA: ICD-10-CM

## 2023-02-09 DIAGNOSIS — I34.0 NON-RHEUMATIC MITRAL REGURGITATION: ICD-10-CM

## 2023-02-09 DIAGNOSIS — I10 ESSENTIAL (PRIMARY) HYPERTENSION: Primary | ICD-10-CM

## 2023-02-09 DIAGNOSIS — I25.10 CORONARY ARTERIOSCLEROSIS: ICD-10-CM

## 2023-02-09 LAB
EST. AVERAGE GLUCOSE BLD GHB EST-MCNC: 126 MG/DL
HBA1C MFR BLD: 6 %

## 2023-02-24 DIAGNOSIS — I10 ESSENTIAL (PRIMARY) HYPERTENSION: ICD-10-CM

## 2023-02-24 RX ORDER — METOPROLOL TARTRATE 50 MG/1
50 TABLET, FILM COATED ORAL EVERY MORNING
Qty: 90 TABLET | Refills: 3 | Status: SHIPPED | OUTPATIENT
Start: 2023-02-24

## 2023-03-23 ENCOUNTER — RA CDI HCC (OUTPATIENT)
Dept: OTHER | Facility: HOSPITAL | Age: 88
End: 2023-03-23

## 2023-03-29 ENCOUNTER — OFFICE VISIT (OUTPATIENT)
Dept: FAMILY MEDICINE CLINIC | Facility: CLINIC | Age: 88
End: 2023-03-29

## 2023-03-29 VITALS
HEIGHT: 70 IN | BODY MASS INDEX: 23.48 KG/M2 | DIASTOLIC BLOOD PRESSURE: 74 MMHG | WEIGHT: 164 LBS | SYSTOLIC BLOOD PRESSURE: 122 MMHG | TEMPERATURE: 97.5 F | HEART RATE: 65 BPM | OXYGEN SATURATION: 97 %

## 2023-03-29 DIAGNOSIS — E11.9 TYPE 2 DIABETES MELLITUS WITHOUT COMPLICATION, WITHOUT LONG-TERM CURRENT USE OF INSULIN (HCC): Primary | ICD-10-CM

## 2023-03-29 DIAGNOSIS — I10 PRIMARY HYPERTENSION: ICD-10-CM

## 2023-03-29 DIAGNOSIS — I25.10 CORONARY ARTERY DISEASE INVOLVING NATIVE CORONARY ARTERY OF NATIVE HEART WITHOUT ANGINA PECTORIS: ICD-10-CM

## 2023-03-29 DIAGNOSIS — I34.0 NONRHEUMATIC MITRAL VALVE REGURGITATION: ICD-10-CM

## 2023-03-29 DIAGNOSIS — E11.9 TYPE 2 DIABETES MELLITUS WITHOUT COMPLICATION, WITHOUT LONG-TERM CURRENT USE OF INSULIN (HCC): ICD-10-CM

## 2023-03-29 DIAGNOSIS — Z00.00 MEDICARE ANNUAL WELLNESS VISIT, SUBSEQUENT: ICD-10-CM

## 2023-03-29 DIAGNOSIS — I35.1 NONRHEUMATIC AORTIC VALVE INSUFFICIENCY: ICD-10-CM

## 2023-03-29 DIAGNOSIS — C61 PROSTATE CANCER (HCC): ICD-10-CM

## 2023-03-29 DIAGNOSIS — I49.5 SSS (SICK SINUS SYNDROME) (HCC): ICD-10-CM

## 2023-03-29 DIAGNOSIS — E78.00 PURE HYPERCHOLESTEROLEMIA: ICD-10-CM

## 2023-03-29 RX ORDER — GLIMEPIRIDE 1 MG/1
1 TABLET ORAL DAILY
Qty: 90 TABLET | Refills: 3 | Status: SHIPPED | OUTPATIENT
Start: 2023-03-29

## 2023-03-29 NOTE — PATIENT INSTRUCTIONS
Medicare Preventive Visit Patient Instructions  Thank you for completing your Welcome to Medicare Visit or Medicare Annual Wellness Visit today  Your next wellness visit will be due in one year (3/29/2024)  The screening/preventive services that you may require over the next 5-10 years are detailed below  Some tests may not apply to you based off risk factors and/or age  Screening tests ordered at today's visit but not completed yet may show as past due  Also, please note that scanned in results may not display below  Preventive Screenings:  Service Recommendations Previous Testing/Comments   Colorectal Cancer Screening  · Colonoscopy    · Fecal Occult Blood Test (FOBT)/Fecal Immunochemical Test (FIT)  · Fecal DNA/Cologuard Test  · Flexible Sigmoidoscopy Age: 39-70 years old   Colonoscopy: every 10 years (May be performed more frequently if at higher risk)  OR  FOBT/FIT: every 1 year  OR  Cologuard: every 3 years  OR  Sigmoidoscopy: every 5 years  Screening may be recommended earlier than age 39 if at higher risk for colorectal cancer  Also, an individualized decision between you and your healthcare provider will decide whether screening between the ages of 74-80 would be appropriate   Colonoscopy: 04/26/2021  FOBT/FIT: Not on file  Cologuard: Not on file  Sigmoidoscopy: Not on file    Screening Not Indicated     Prostate Cancer Screening Individualized decision between patient and health care provider in men between ages of 53-78   Medicare will cover every 12 months beginning on the day after your 50th birthday PSA: <0 1 ng/mL     History Prostate Cancer  Screening Not Indicated     Hepatitis C Screening Once for adults born between 1945 and 1965  More frequently in patients at high risk for Hepatitis C Hep C Antibody: Not on file        Diabetes Screening 1-2 times per year if you're at risk for diabetes or have pre-diabetes Fasting glucose: 109 mg/dL (2/8/2023)  A1C: 6 0 % (2/8/2023)  Screening Not Indicated  History Diabetes   Cholesterol Screening Once every 5 years if you don't have a lipid disorder  May order more often based on risk factors  Lipid panel: 02/08/2023  Screening Not Indicated  History Lipid Disorder      Other Preventive Screenings Covered by Medicare:  1  Abdominal Aortic Aneurysm (AAA) Screening: covered once if your at risk  You're considered to be at risk if you have a family history of AAA or a male between the age of 73-68 who smoking at least 100 cigarettes in your lifetime  2  Lung Cancer Screening: covers low dose CT scan once per year if you meet all of the following conditions: (1) Age 50-69; (2) No signs or symptoms of lung cancer; (3) Current smoker or have quit smoking within the last 15 years; (4) You have a tobacco smoking history of at least 20 pack years (packs per day x number of years you smoked); (5) You get a written order from a healthcare provider  3  Glaucoma Screening: covered annually if you're considered high risk: (1) You have diabetes OR (2) Family history of glaucoma OR (3)  aged 48 and older OR (3)  American aged 72 and older  3  Osteoporosis Screening: covered every 2 years if you meet one of the following conditions: (1) Have a vertebral abnormality; (2) On glucocorticoid therapy for more than 3 months; (3) Have primary hyperparathyroidism; (4) On osteoporosis medications and need to assess response to drug therapy  5  HIV Screening: covered annually if you're between the age of 12-76  Also covered annually if you are younger than 13 and older than 72 with risk factors for HIV infection  For pregnant patients, it is covered up to 3 times per pregnancy      Immunizations:  Immunization Recommendations   Influenza Vaccine Annual influenza vaccination during flu season is recommended for all persons aged >= 6 months who do not have contraindications   Pneumococcal Vaccine   * Pneumococcal conjugate vaccine = PCV13 (Prevnar 13), PCV15 (Vaxneuvance), PCV20 (Prevnar 20)  * Pneumococcal polysaccharide vaccine = PPSV23 (Pneumovax) Adults 2364 years old: 1-3 doses may be recommended based on certain risk factors  Adults 72 years old: 1-2 doses may be recommended based off what pneumonia vaccine you previously received   Hepatitis B Vaccine 3 dose series if at intermediate or high risk (ex: diabetes, end stage renal disease, liver disease)   Tetanus (Td) Vaccine - COST NOT COVERED BY MEDICARE PART B Following completion of primary series, a booster dose should be given every 10 years to maintain immunity against tetanus  Td may also be given as tetanus wound prophylaxis  Tdap Vaccine - COST NOT COVERED BY MEDICARE PART B Recommended at least once for all adults  For pregnant patients, recommended with each pregnancy  Shingles Vaccine (Shingrix) - COST NOT COVERED BY MEDICARE PART B  2 shot series recommended in those aged 48 and above     Health Maintenance Due:      Topic Date Due   • Colorectal Cancer Screening  04/26/2026     Immunizations Due:  There are no preventive care reminders to display for this patient  Advance Directives   What are advance directives? Advance directives are legal documents that state your wishes and plans for medical care  These plans are made ahead of time in case you lose your ability to make decisions for yourself  Advance directives can apply to any medical decision, such as the treatments you want, and if you want to donate organs  What are the types of advance directives? There are many types of advance directives, and each state has rules about how to use them  You may choose a combination of any of the following:  · Living will: This is a written record of the treatment you want  You can also choose which treatments you do not want, which to limit, and which to stop at a certain time  This includes surgery, medicine, IV fluid, and tube feedings     · Durable power of  for healthcare [facility]): This is a written record that states who you want to make healthcare choices for you when you are unable to make them for yourself  This person, called a proxy, is usually a family member or a friend  You may choose more than 1 proxy  · Do not resuscitate (DNR) order:  A DNR order is used in case your heart stops beating or you stop breathing  It is a request not to have certain forms of treatment, such as CPR  A DNR order may be included in other types of advance directives  · Medical directive: This covers the care that you want if you are in a coma, near death, or unable to make decisions for yourself  You can list the treatments you want for each condition  Treatment may include pain medicine, surgery, blood transfusions, dialysis, IV or tube feedings, and a ventilator (breathing machine)  · Values history: This document has questions about your views, beliefs, and how you feel and think about life  This information can help others choose the care that you would choose  Why are advance directives important? An advance directive helps you control your care  Although spoken wishes may be used, it is better to have your wishes written down  Spoken wishes can be misunderstood, or not followed  Treatments may be given even if you do not want them  An advance directive may make it easier for your family to make difficult choices about your care  © Copyright [employer] 2018 Information is for End User's use only and may not be sold, redistributed or otherwise used for commercial purposes   All illustrations and images included in CareNotes® are the copyrighted property of [employer]  or [address] [place] [address] [employer]

## 2023-03-29 NOTE — PROGRESS NOTES
" Assessment and Plan:     Problem List Items Addressed This Visit        Endocrine    Type 2 diabetes mellitus ([facility] ) - Primary    Relevant Orders    Hemoglobin A1C       Cardiovascular and Mediastinum    Aortic valve regurgitation    CAD (coronary artery disease)    Hypertension    Relevant Orders    Comprehensive metabolic panel    CBC and differential    Mitral regurgitation    SSS (sick sinus syndrome) ([facility])       Genitourinary    Prostate cancer ([facility] )       Other    Hyperlipidemia    Relevant Orders    Lipid panel   Other Visit Diagnoses     Medicare annual wellness visit, subsequent            Continue with current medications  Office visit 6 months with repeat labs  Patient is cleared for eyelid surgery  Hold Glimepiride  on the morning of surgery  Take his Metoprolol on the day of procedure with sips of water  Per patient he was instructed to stop his ASA 10 days prior to surgery by his surgeon's office       Depression Screening and Follow-up Plan: Patient was screened for depression during today's encounter  They screened negative with a PHQ-2 score of 0  Answers for HPI/ROS submitted by the patient on 3/28/2023  How would you rate your overall health?: very good  Compared to last year, how is your physical health?: same  In general, how satisfied are you with your life?: satisfied  Compared to last year, how is your eyesight?: slightly worse  Compared to last year, how is your hearing?: same  Compared to last year, how is your emotional/mental health?: same  How often is anger a problem for you?: never, rarely  How often do you feel unusually tired/fatigued?: sometimes  In the past 7 days, how much pain have you experienced?: some  If you answered \"some\" or \"a lot\", please rate the severity of your pain on a scale of 1 to 10 (1 being the least severe pain and 10 being the most intense pain)  : 2/10  In the past 6 months, have you lost or gained 10 pounds without trying?: No  One or more falls in the " last year: No  Do you have trouble with the stairs inside or outside your home?: No  Does your home have working smoke alarms?: Yes  Does your home have a carbon monoxide monitor?: Yes  Which safety hazards (if any) have you experienced in your home? Please select all that apply : none  How would you describe your current diet?  Please select all that apply : Low Carb  In addition to prescription medications, are you taking any over-the-counter supplements?: Yes  If yes, what supplements are you taking?: Multivitamin-one-a-day for men  Can you manage your medications?: Yes  Are you currently taking any opioid medications?: No  Can you walk and transfer into and out of your bed and chair?: Yes  Can you dress and groom yourself?: Yes  Can you bathe or shower yourself?: Yes  Can you feed yourself?: Yes  Can you do your laundry/ housekeeping?: Yes  Can you manage your money, pay your bills, and track your expenses?: Yes  Can you make your own meals?: Yes  Can you do your own shopping?: Yes  Please list your DME (Durable Medical Equipment) supplier, if you use one : None  Within the last 12 months, have you had any hospitalizations or Emergency Department visits?: Yes  If yes, how many times have you been hospitalized within the past year?: 1-2  Additional Comments: Not hospitalized  Do you have a living will?: Yes  Do you have a Durable POA (Power of ) for healthcare decisions?: Yes  Do you have an Advanced Directive for end of life decisions?: Yes  How often have you used an illegal drug (including marijuana) or a prescription medication for non-medical reasons in the past year?: never  What is the typical number of drinks you consume in a day?: 0  What is the typical number of drinks you consume in a week?: 0  How often did you have a drink containing alcohol in the past year?: never  How many drinks did you have on a typical day  when you were drinking in the past year?: 0  How often did you have 6 or more drinks on one occasion in the past year?: never      Preventive health issues were discussed with patient, and age appropriate screening tests were ordered as noted in patient's After Visit Summary  Personalized health advice and appropriate referrals for health education or preventive services given if needed, as noted in patient's After Visit Summary  History of Present Illness:     Patient presents for a Medicare Wellness Visit    Follow up visit  Medications reviewed Type 2 diabetes mellitus  On Glimepiride 1 mg daily  02/2023   A1c 6 0  Urine micro albumin 7 0 Not on ACE or ARB  No hypoglycemic events  No neuropathy symptoms  He is followed by Podiatry  Current with eye exam  Hypertension blood pressures have been stable on Metoprolol tartrate 50 mg in AM and 25 mg in PM  02/2023 Creatinine 0 97  GFR 69  Electrolytes normal   Hgb 12 5  Hyperlipidemia and CAD on Atorvastatin 10 mg daily lipid profile  02/2023 cholesterol 166  HDL 46  Triglycerides 106  LDL 99  LFTs normal   03/2019 TSH 1 78  He is scheduled for  Upper eyelid surgery for inverted eyelashes/ptosis with   Dr Jessie Aguilar next month           Recent Results (from the past 1344 hour(s))   Comprehensive metabolic panel    Collection Time: 02/08/23  9:19 AM   Result Value Ref Range    Sodium 140 135 - 147 mmol/L    Potassium 4 1 3 5 - 5 3 mmol/L    Chloride 108 96 - 108 mmol/L    CO2 28 21 - 32 mmol/L    ANION GAP 4 4 - 13 mmol/L    BUN 23 5 - 25 mg/dL    Creatinine 0 97 0 60 - 1 30 mg/dL    Glucose, Fasting 109 (H) 65 - 99 mg/dL    Calcium 9 6 8 3 - 10 1 mg/dL    AST 18 5 - 45 U/L    ALT 23 12 - 78 U/L    Alkaline Phosphatase 60 46 - 116 U/L    Total Protein 7 1 6 4 - 8 4 g/dL    Albumin 3 9 3 5 - 5 0 g/dL    Total Bilirubin 0 57 0 20 - 1 00 mg/dL    eGFR 69 ml/min/1 73sq m   CBC and differential    Collection Time: 02/08/23  9:19 AM   Result Value Ref Range    WBC 4 89 4 31 - 10 16 Thousand/uL    RBC 4 12 3 88 - 5 62 Million/uL Hemoglobin 12 5 12 0 - 17 0 g/dL    Hematocrit 38 6 36 5 - 49 3 %    MCV 94 82 - 98 fL    MCH 30 3 26 8 - 34 3 pg    MCHC 32 4 31 4 - 37 4 g/dL    RDW 13 1 11 6 - 15 1 %    MPV 11 1 8 9 - 12 7 fL    Platelets 047 675 - 164 Thousands/uL    nRBC 0 /100 WBCs    Neutrophils Relative 67 43 - 75 %    Immat GRANS % 0 0 - 2 %    Lymphocytes Relative 18 14 - 44 %    Monocytes Relative 9 4 - 12 %    Eosinophils Relative 6 0 - 6 %    Basophils Relative 0 0 - 1 %    Neutrophils Absolute 3 20 1 85 - 7 62 Thousands/µL    Immature Grans Absolute 0 01 0 00 - 0 20 Thousand/uL    Lymphocytes Absolute 0 90 0 60 - 4 47 Thousands/µL    Monocytes Absolute 0 46 0 17 - 1 22 Thousand/µL    Eosinophils Absolute 0 30 0 00 - 0 61 Thousand/µL    Basophils Absolute 0 02 0 00 - 0 10 Thousands/µL   Lipid panel    Collection Time: 02/08/23  9:19 AM   Result Value Ref Range    Cholesterol 166 See Comment mg/dL    Triglycerides 106 See Comment mg/dL    HDL, Direct 46 >=40 mg/dL    LDL Calculated 99 0 - 100 mg/dL    Non-HDL-Chol (CHOL-HDL) 120 mg/dl   Hemoglobin A1C    Collection Time: 02/08/23  9:19 AM   Result Value Ref Range    Hemoglobin A1C 6 0 (H) Normal 3 8-5 6%; PreDiabetic 5 7-6 4%; Diabetic >=6 5%; Glycemic control for adults with diabetes <7 0% %     mg/dl   Microalbumin / creatinine urine ratio    Collection Time: 02/08/23  9:19 AM   Result Value Ref Range    Creatinine, Ur 112 0 mg/dL    Microalbum  ,U,Random 7 0 0 0 - 20 0 mg/L    Microalb Creat Ratio 6 0 - 30 mg/g creatinine   PSA Total, Diagnostic    Collection Time: 02/08/23  9:19 AM   Result Value Ref Range    PSA, Diagnostic <0 1 0 0 - 4 0 ng/mL             Patient Care Team:  Marcos Friedman MD as PCP - General  MD Phuong Peterson MD Laneta Carrion, MD (Urology)  Nadiya Mejia MD (Dermatology)  Damian Adamson DPM (Podiatry)     Review of Systems:     Review of Systems   Constitutional: Negative for appetite change, chills, fever and unexpected weight change  HENT: Negative for congestion, ear pain, rhinorrhea, sore throat and trouble swallowing  Eyes: Negative for visual disturbance  Respiratory: Negative for cough, shortness of breath and wheezing  Cardiovascular: Negative for chest pain, palpitations and leg swelling  CAD s/p stents  S/p pacemaker  02/2018  Echocardiogram normal left ventricular systolic function  EF 55%  No regional wall motion abnormalities  Grade 1 diastolic dysfunction  mild mitral regurgitation  Mild aortic regurgitation  Trace TR/IA  No pericardial effusion  Aortic root normal size  Gastrointestinal: Negative for abdominal pain, blood in stool, constipation, diarrhea, nausea and vomiting  Colonoscopy 04/2021   Endocrine: Negative for polydipsia and polyuria  Genitourinary: Negative for difficulty urinating  Prostate CA s/p XRT 2006  He is followed by Urology  He has been receiving ADT injections for elevated PSA     Lab Results       Component                Value               Date                       PSA                      <0 1                02/08/2023                 PSA                      <0 1                01/05/2023                 PSA                      <0 1                08/04/2022                                           Musculoskeletal: Negative for arthralgias, gait problem and myalgias  OA S/p left THR 01/2017  Skin: Negative for rash  History of BCCs/SCCs followed by Dermatology   Allergic/Immunologic: Negative for environmental allergies  Neurological: Negative for dizziness and headaches  Hematological: Negative for adenopathy  Does not bruise/bleed easily          Lab Results       Component                Value               Date                       WBC                      4 89                02/08/2023                 HGB                      12 5                02/08/2023                 HCT                      38 6 02/08/2023                 MCV                      94                  02/08/2023                 PLT                      207                 02/08/2023                Hemoglobin       Date                     Value               Ref Range           Status                12/02/2022               11 8 (L)            12 0 - 17 0 g/*     Final                 09/27/2022               11 2 (L)            12 0 - 17 0 g/*     Final                 08/04/2022               12 4                12 0 - 17 0 g/*     Final                 12/22/2015               13 3                12 0 - 17 0 g/*     Final                 02/20/2015               13 2                12 0 - 17 0 g/*     Final                 11/22/2014               14 3                12 0 - 17 0 g/*     Final                 Psychiatric/Behavioral: Negative for dysphoric mood and sleep disturbance          Problem List:     Patient Active Problem List   Diagnosis   • Aortic valve regurgitation   • CAD (coronary artery disease)   • Carpal tunnel syndrome   • Disc degeneration, lumbar   • Facet arthritis of lumbar region   • Glaucoma   • Hyperlipidemia   • Hypertension   • Lumbar canal stenosis   • Mitral regurgitation   • Prostate cancer (HCC)   • Type 2 diabetes mellitus ([facility] )   • SSS (sick sinus syndrome) ([facility] )      Past Medical and Surgical History:     Past Medical History:   Diagnosis Date   • Basal cell carcinoma    • Benign polyp of large intestine    • Cancer ([facility] )    • Diabetes mellitus ([facility] ) 2010   • Osteoarthritis of left hip     last assessed [id number]   • Piriformis syndrome of left side     last assessed [id number]     Past Surgical History:   Procedure Laterality Date   • APPENDECTOMY  1966   • CARDIAC PACEMAKER PLACEMENT  05/01/2005   • CATARACT EXTRACTION Left 10/01/1995   • CATARACT EXTRACTION Right 02/01/2000   • CORONARY ANGIOPLASTY  01/01/2010 2010   • CORONARY ANGIOPLASTY WITH STENT PLACEMENT  11/01/2002   • EYE SURGERY Cataract  2000 & 2002   • INGUINAL HERNIA REPAIR Right 01/01/1998   • INGUINAL HERNIA REPAIR Left 01/01/2009 2009   • JOINT REPLACEMENT  2010 &  2017   • LUMBAR LAMINECTOMY  01/01/2002 2002   • REPLACEMENT TOTAL KNEE Right 06/01/2010    right total knee replacement with complications   • RHIZOTOMY     • TONSILLECTOMY AND ADENOIDECTOMY  01/01/1937    1937   • TOTAL HIP ARTHROPLASTY Left 01/18/2017      Family History:     Family History   Problem Relation Age of Onset   • Coronary artery disease Mother    • Diabetes Maternal Grandmother       Social History:     Social History     Socioeconomic History   • Marital status: /Civil Union     Spouse name: None   • Number of children: None   • Years of education: None   • Highest education level: None   Occupational History   • None   Tobacco Use   • Smoking status: Never   • Smokeless tobacco: Never   Substance and Sexual Activity   • Alcohol use: No   • Drug use: Never   • Sexual activity: Not Currently     Partners: Female     Birth control/protection: None   Other Topics Concern   • None   Social History Narrative   • None     Social Determinants of Health     Financial Resource Strain: Low Risk    • Difficulty of Paying Living Expenses: Not very hard   Food Insecurity: Not on file   Transportation Needs: No Transportation Needs   • Lack of Transportation (Medical): No   • Lack of Transportation (Non-Medical):  No   Physical Activity: Not on file   Stress: Not on file   Social Connections: Not on file   Intimate Partner Violence: Not on file   Housing Stability: Not on file      Medications and Allergies:     Current Outpatient Medications   Medication Sig Dispense Refill   • aspirin 81 MG tablet Take 1 tablet by mouth daily Except sun      • atorvastatin (LIPITOR) 10 mg tablet TAKE 1 TABLET DAILY 90 tablet 3   • cephalexin (KEFLEX) 500 mg capsule For dental visits  0   • glimepiride (AMARYL) 1 mg tablet Take 1 tablet (1 mg total) by mouth daily 90 tablet "3   • glucose blood (ONE TOUCH ULTRA TEST) test strip Patient tests once daily  100 each 3   • Lancets (OneTouch Delica Plus [id number]) MISC Use 1 application daily 887 each 1   • leuprolide (Eligard) 22 5 mg Inject 22 5 mg under the skin every 3 (three) months     • metoprolol tartrate (LOPRESSOR) 25 mg tablet Take 1 tablet (25 mg total) by mouth every evening 90 tablet 3   • metoprolol tartrate (LOPRESSOR) 50 mg tablet Take 1 tablet (50 mg total) by mouth every morning 90 tablet 3   • Multiple Vitamins-Minerals (MULTIVITAMIN ADULT PO) Take 1 tablet by mouth every other day  No current facility-administered medications for this visit       Allergies   Allergen Reactions   • Penicillins Rash   • Plavix [Clopidogrel] Rash      Immunizations:     Immunization History   Administered Date(s) Administered   • COVID-19 MODERNA VACC 0 5 ML IM 01/19/2021, 02/16/2021, 10/21/2021   • COVID-19 Moderna Vac BIVALENT 12 Yr+ IM (BOOSTER ONLY) 0 5 ML 09/10/2022   • COVID-19 Moderna vac 6-11y or adult booster 50 mcg/0 5 mL 04/13/2022   • INFLUENZA 09/26/2022   • Influenza Split High Dose Preservative Free IM 12/04/2012, 12/13/2013, 10/01/2014, 09/24/2015, 11/09/2016, 11/02/2017   • Influenza, high dose seasonal 0 7 mL 10/26/2018, 10/08/2019, 10/14/2020, 11/17/2021, 09/26/2022   • Pneumococcal Conjugate 13-Valent 02/23/2016   • Pneumococcal Polysaccharide PPV23 12/13/2011   • Zoster 05/30/2013   • Zoster Vaccine Recombinant 06/12/2019, 08/15/2019      Health Maintenance:         Topic Date Due   • Colorectal Cancer Screening  04/26/2026        Medicare Screening Tests and Risk Assessments:     See above      Physical Exam:     /74 (BP Location: Right arm, Patient Position: Sitting, Cuff Size: Large)   Pulse 65   Temp 97 5 °F (36 4 °C)   Ht 5' 10\" (1 778 m)   Wt 74 4 kg (164 lb)   SpO2 97%   BMI 23 53 kg/m²     BP Readings from Last 3 Encounters:   03/29/23 122/74   02/09/23 138/78   12/05/22 144/92     Wt Readings " from Last 3 Encounters:   03/29/23 74 4 kg (164 lb)   02/09/23 73 5 kg (162 lb)   12/05/22 73 9 kg (163 lb)         Physical Exam  Constitutional:       General: He is not in acute distress  HENT:      Right Ear: Tympanic membrane and ear canal normal       Left Ear: Tympanic membrane and ear canal normal    Eyes:      General: No scleral icterus  Extraocular Movements: Extraocular movements intact  Conjunctiva/sclera: Conjunctivae normal       Pupils: Pupils are equal, round, and reactive to light  Neck:      Vascular: No carotid bruit  Cardiovascular:      Rate and Rhythm: Normal rate and regular rhythm  Heart sounds: No murmur heard  No gallop  Pulmonary:      Effort: Pulmonary effort is normal       Breath sounds: Normal breath sounds  No wheezing or rales  Musculoskeletal:      Right lower leg: No edema  Left lower leg: No edema  Lymphadenopathy:      Cervical: No cervical adenopathy  Skin:     Findings: No rash  Neurological:      General: No focal deficit present  Mental Status: He is alert and oriented to person, place, and time     Psychiatric:         Mood and Affect: Mood normal          Behavior: Behavior normal          Cognition and Memory: Cognition normal           Kade Shah MD

## 2023-05-16 DIAGNOSIS — E11.9 TYPE 2 DIABETES MELLITUS WITHOUT COMPLICATION, WITHOUT LONG-TERM CURRENT USE OF INSULIN (HCC): ICD-10-CM

## 2023-05-17 ENCOUNTER — APPOINTMENT (OUTPATIENT)
Dept: LAB | Facility: CLINIC | Age: 88
End: 2023-05-17

## 2023-05-17 DIAGNOSIS — C61 PROSTATE CANCER (HCC): ICD-10-CM

## 2023-05-17 LAB
ALBUMIN SERPL BCP-MCNC: 3.7 G/DL (ref 3.5–5)
ALP SERPL-CCNC: 65 U/L (ref 46–116)
ALT SERPL W P-5'-P-CCNC: 24 U/L (ref 12–78)
AST SERPL W P-5'-P-CCNC: 18 U/L (ref 5–45)
BASOPHILS # BLD AUTO: 0.04 THOUSANDS/ÂΜL (ref 0–0.1)
BASOPHILS NFR BLD AUTO: 1 % (ref 0–1)
BILIRUB DIRECT SERPL-MCNC: 0.1 MG/DL (ref 0–0.2)
BILIRUB SERPL-MCNC: 0.44 MG/DL (ref 0.2–1)
BUN SERPL-MCNC: 21 MG/DL (ref 5–25)
CALCIUM SERPL-MCNC: 9.3 MG/DL (ref 8.3–10.1)
CREAT SERPL-MCNC: 0.93 MG/DL (ref 0.6–1.3)
EOSINOPHIL # BLD AUTO: 0.34 THOUSAND/ÂΜL (ref 0–0.61)
EOSINOPHIL NFR BLD AUTO: 7 % (ref 0–6)
ERYTHROCYTE [DISTWIDTH] IN BLOOD BY AUTOMATED COUNT: 13 % (ref 11.6–15.1)
GFR SERPL CREATININE-BSD FRML MDRD: 73 ML/MIN/1.73SQ M
HCT VFR BLD AUTO: 40.4 % (ref 36.5–49.3)
HGB BLD-MCNC: 12.6 G/DL (ref 12–17)
IMM GRANULOCYTES # BLD AUTO: 0.01 THOUSAND/UL (ref 0–0.2)
IMM GRANULOCYTES NFR BLD AUTO: 0 % (ref 0–2)
LYMPHOCYTES # BLD AUTO: 0.83 THOUSANDS/ÂΜL (ref 0.6–4.47)
LYMPHOCYTES NFR BLD AUTO: 17 % (ref 14–44)
MCH RBC QN AUTO: 30.2 PG (ref 26.8–34.3)
MCHC RBC AUTO-ENTMCNC: 31.2 G/DL (ref 31.4–37.4)
MCV RBC AUTO: 97 FL (ref 82–98)
MONOCYTES # BLD AUTO: 0.43 THOUSAND/ÂΜL (ref 0.17–1.22)
MONOCYTES NFR BLD AUTO: 9 % (ref 4–12)
NEUTROPHILS # BLD AUTO: 3.18 THOUSANDS/ÂΜL (ref 1.85–7.62)
NEUTS SEG NFR BLD AUTO: 66 % (ref 43–75)
NRBC BLD AUTO-RTO: 0 /100 WBCS
PLATELET # BLD AUTO: 210 THOUSANDS/UL (ref 149–390)
PMV BLD AUTO: 11.5 FL (ref 8.9–12.7)
PROT SERPL-MCNC: 7.3 G/DL (ref 6.4–8.4)
PSA SERPL-MCNC: 0.03 NG/ML (ref 0–4)
RBC # BLD AUTO: 4.17 MILLION/UL (ref 3.88–5.62)
TESTOST SERPL-MSCNC: 25 NG/DL
WBC # BLD AUTO: 4.83 THOUSAND/UL (ref 4.31–10.16)

## 2023-05-17 RX ORDER — BLOOD SUGAR DIAGNOSTIC
STRIP MISCELLANEOUS
Qty: 100 STRIP | Refills: 3 | Status: SHIPPED | OUTPATIENT
Start: 2023-05-17 | End: 2023-05-18 | Stop reason: SDUPTHER

## 2023-05-18 DIAGNOSIS — E11.9 TYPE 2 DIABETES MELLITUS WITHOUT COMPLICATION, WITHOUT LONG-TERM CURRENT USE OF INSULIN (HCC): ICD-10-CM

## 2023-05-19 RX ORDER — BLOOD SUGAR DIAGNOSTIC
STRIP MISCELLANEOUS
Qty: 100 STRIP | Refills: 0 | Status: SHIPPED | OUTPATIENT
Start: 2023-05-19

## 2023-07-19 LAB — TESTOST SERPL-MSCNC: 25 NG/DL

## 2023-08-02 NOTE — PROGRESS NOTES
Cardiology Follow Up    Dulce Nolan .  1934  [id number]  [facility] [address]  [address] [id number] [phone]    1. Presence of permanent cardiac pacemaker        2. Essential (primary) hypertension        3. Pure hypercholesterolemia        4. Coronary arteriosclerosis        5. Non-rheumatic mitral regurgitation            Interval History:  Patient is here for a follow-up visit.  Most recent [employer] pacer PPM  interogation 8/2023 demonstrated an appropriately functioning device with no significant arrhythmia noted. He has CAD with PCI of the LAD.  Echocardiogram done February 2018 demonstrated preserved LV systolic function and no significant valvular heart disease.  Patient had a lipid profile done 2/2023 which demonstrated total cholesterol of 166 with an HDL of 46 and a calculated LDL of 99.  He is on atorvastatin.  He has prostate cancer and has ongoing follow-up with urology. He went to the [facility] December 2022 with left shoulder discomfort. He was concerned about a cardiac etiology. His workup was negative. Patient has been well. He has had no chest pain or significant dyspnea. His vital signs are stable today. He now has a great grandchild named Brett Mcfarland and had a new great grandchild in July his name is Esau.     Patient Active Problem List   Diagnosis   • Aortic valve regurgitation   • CAD (coronary artery disease)   • Carpal tunnel syndrome   • Disc degeneration, lumbar   • Facet arthritis of lumbar region   • Glaucoma   • Hyperlipidemia   • Hypertension   • Lumbar canal stenosis   • Mitral regurgitation   • Prostate cancer (HCC)   • Type 2 diabetes mellitus (HCC)   • SSS (sick sinus syndrome) ([address])     Past Medical History:   Diagnosis Date   • Basal cell carcinoma    • Benign polyp of large intestine    • Cancer (HCC)    • Diabetes mellitus ([address]) 2010   • Osteoarthritis of left hip last assessed [id number]   • Piriformis syndrome of left side     last assessed [id number]     Social History     Socioeconomic History   • Marital status: /Civil Union     Spouse name: Not on file   • Number of children: Not on file   • Years of education: Not on file   • Highest education level: Not on file   Occupational History   • Not on file   Tobacco Use   • Smoking status: Never   • Smokeless tobacco: Never   Substance and Sexual Activity   • Alcohol use: No   • Drug use: Never   • Sexual activity: Not Currently     Partners: Female     Birth control/protection: None   Other Topics Concern   • Not on file   Social History Narrative   • Not on file     Social Determinants of Health     Financial Resource Strain: Low Risk  (3/28/2023)    Overall Financial Resource Strain (CARDIA)    • Difficulty of Paying Living Expenses: Not very hard   Food Insecurity: Not on file   Transportation Needs: No Transportation Needs (3/28/2023)    PRAPARE - Transportation    • Lack of Transportation (Medical): No    • Lack of Transportation (Non-Medical):  No   Physical Activity: Not on file   Stress: Not on file   Social Connections: Not on file   Intimate Partner Violence: Not on file   Housing Stability: Not on file      Family History   Problem Relation Age of Onset   • Coronary artery disease Mother    • Diabetes Maternal Grandmother      Past Surgical History:   Procedure Laterality Date   • APPENDECTOMY  1966   • CARDIAC PACEMAKER PLACEMENT  05/01/2005   • CATARACT EXTRACTION Left 10/01/1995   • CATARACT EXTRACTION Right 02/01/2000   • CORONARY ANGIOPLASTY  01/01/2010 2010   • CORONARY ANGIOPLASTY WITH STENT PLACEMENT  11/01/2002   • EYE SURGERY  Cataract  2000 & 2002   • INGUINAL HERNIA REPAIR Right 01/01/1998   • INGUINAL HERNIA REPAIR Left 01/01/2009 2009   • JOINT REPLACEMENT  2010 &  2017   • LUMBAR LAMINECTOMY  01/01/2002 2002   • REPLACEMENT TOTAL KNEE Right 06/01/2010    right total knee replacement with complications   • RHIZOTOMY     • TONSILLECTOMY AND ADENOIDECTOMY  01/01/1937    1937   • TOTAL HIP ARTHROPLASTY Left 01/18/2017       Current Outpatient Medications:   •  aspirin 81 MG tablet, Take 1 tablet by mouth daily Except sun , Disp: , Rfl:   •  atorvastatin (LIPITOR) 10 mg tablet, TAKE 1 TABLET DAILY, Disp: 90 tablet, Rfl: 3  •  cephalexin (KEFLEX) 500 mg capsule, For dental visits, Disp: , Rfl: 0  •  glimepiride (AMARYL) 1 mg tablet, Take 1 tablet (1 mg total) by mouth daily, Disp: 90 tablet, Rfl: 3  •  glucose blood (OneTouch Ultra) test strip, Patient tests once daily. , Disp: 100 strip, Rfl: 0  •  Lancets (OneTouch Delica Plus [id number]) MISC, Use 1 application daily, Disp: 100 each, Rfl: 1  •  leuprolide (Eligard) 22.5 mg, Inject 22.5 mg under the skin every 3 (three) months, Disp: , Rfl:   •  metoprolol tartrate (LOPRESSOR) 25 mg tablet, Take 1 tablet (25 mg total) by mouth every evening, Disp: 90 tablet, Rfl: 3  •  metoprolol tartrate (LOPRESSOR) 50 mg tablet, Take 1 tablet (50 mg total) by mouth every morning, Disp: 90 tablet, Rfl: 3  •  Multiple Vitamins-Minerals (MULTIVITAMIN ADULT PO), Take 1 tablet by mouth every other day., Disp: , Rfl:   Allergies   Allergen Reactions   • Penicillins Rash   • Plavix [Clopidogrel] Rash       Labs:not applicable  Imaging: No results found. Review of Systems:  Review of Systems   All other systems reviewed and are negative. Physical Exam:  /66 (BP Location: Left arm, Patient Position: Sitting, Cuff Size: Standard)   Pulse 84   Wt 73.7 kg (162 lb 8 oz)   SpO2 99%   BMI 23.32 kg/m²   Physical Exam  Vitals reviewed. Constitutional:       Appearance: He is well-developed. HENT:      Head: Normocephalic and atraumatic. Cardiovascular:      Rate and Rhythm: Normal rate. Heart sounds: Normal heart sounds. Pulmonary:      Effort: Pulmonary effort is normal.      Breath sounds: Normal breath sounds.    Musculoskeletal: Cervical back: Normal range of motion. Skin:     General: Skin is warm and dry. Neurological:      Mental Status: He is alert and oriented to person, place, and time. Discussion/Summary:I will continue the patient's present medical regimen. The patient appears well compensated. I have asked the patient to call if there is a problem in the interim otherwise I will see the patient in six months time.

## 2023-08-11 ENCOUNTER — OFFICE VISIT (OUTPATIENT)
Dept: CARDIOLOGY CLINIC | Facility: CLINIC | Age: 88
End: 2023-08-11
Payer: MEDICARE

## 2023-08-11 ENCOUNTER — IN-CLINIC DEVICE VISIT (OUTPATIENT)
Dept: CARDIOLOGY CLINIC | Facility: CLINIC | Age: 88
End: 2023-08-11
Payer: MEDICARE

## 2023-08-11 VITALS
DIASTOLIC BLOOD PRESSURE: 66 MMHG | SYSTOLIC BLOOD PRESSURE: 124 MMHG | WEIGHT: 162.5 LBS | OXYGEN SATURATION: 99 % | HEART RATE: 84 BPM | BODY MASS INDEX: 23.32 KG/M2

## 2023-08-11 DIAGNOSIS — I10 ESSENTIAL (PRIMARY) HYPERTENSION: ICD-10-CM

## 2023-08-11 DIAGNOSIS — I34.0 NON-RHEUMATIC MITRAL REGURGITATION: ICD-10-CM

## 2023-08-11 DIAGNOSIS — I25.10 CORONARY ARTERIOSCLEROSIS: ICD-10-CM

## 2023-08-11 DIAGNOSIS — E78.00 PURE HYPERCHOLESTEROLEMIA: ICD-10-CM

## 2023-08-11 DIAGNOSIS — Z95.0 PRESENCE OF PERMANENT CARDIAC PACEMAKER: Primary | ICD-10-CM

## 2023-08-11 DIAGNOSIS — Z95.0 CARDIAC PACEMAKER IN SITU: Primary | ICD-10-CM

## 2023-08-11 PROCEDURE — 99214 OFFICE O/P EST MOD 30 MIN: CPT | Performed by: INTERNAL MEDICINE

## 2023-08-11 PROCEDURE — 93280 PM DEVICE PROGR EVAL DUAL: CPT | Performed by: INTERNAL MEDICINE

## 2023-08-11 NOTE — PROGRESS NOTES
Results for orders placed or performed in visit on 08/11/23   Cardiac EP device report    Narrative    MDT DUAL PPM - NOT MRI CONDITIONAL  DEVICE INTERROGATED IN THE NOTODDEN OFFICE. BATTERY VOLTAGE ADEQUATE (5.5 YRS). AP-98%, -96% (>40% [email or web address]). ALL LEAD PARAMETERS WITHIN NORMAL LIMITS. 1 NSVT EPISODE ON 4/24/23 FOR 6 BEATS >200BPM. PT DID NOT RECALL ANY SYMPTOMS. EF-55% (ECHO 2/15/18). PT ON ASA 81MG & METOPROLOL. PT TO SEE DR. Mariela Choe. NORMAL DEVICE FUNCTION.  GV

## 2023-08-28 ENCOUNTER — APPOINTMENT (OUTPATIENT)
Dept: LAB | Facility: CLINIC | Age: 88
End: 2023-08-28
Payer: MEDICARE

## 2023-08-28 DIAGNOSIS — C61 PROSTATE CANCER (HCC): ICD-10-CM

## 2023-08-28 LAB
ALBUMIN SERPL BCP-MCNC: 4.1 G/DL (ref 3.5–5)
ALP SERPL-CCNC: 55 U/L (ref 34–104)
ALT SERPL W P-5'-P-CCNC: 16 U/L (ref 7–52)
ANION GAP SERPL CALCULATED.3IONS-SCNC: 8 MMOL/L
AST SERPL W P-5'-P-CCNC: 22 U/L (ref 13–39)
BASOPHILS # BLD AUTO: 0.04 THOUSANDS/ÂΜL (ref 0–0.1)
BASOPHILS NFR BLD AUTO: 1 % (ref 0–1)
BILIRUB SERPL-MCNC: 0.53 MG/DL (ref 0.2–1)
BUN SERPL-MCNC: 21 MG/DL (ref 5–25)
CALCIUM SERPL-MCNC: 9.8 MG/DL (ref 8.4–10.2)
CHLORIDE SERPL-SCNC: 106 MMOL/L (ref 96–108)
CHOLEST SERPL-MCNC: 150 MG/DL
CO2 SERPL-SCNC: 26 MMOL/L (ref 21–32)
CREAT SERPL-MCNC: 0.97 MG/DL (ref 0.6–1.3)
EOSINOPHIL # BLD AUTO: 0.31 THOUSAND/ÂΜL (ref 0–0.61)
EOSINOPHIL NFR BLD AUTO: 6 % (ref 0–6)
ERYTHROCYTE [DISTWIDTH] IN BLOOD BY AUTOMATED COUNT: 12.9 % (ref 11.6–15.1)
EST. AVERAGE GLUCOSE BLD GHB EST-MCNC: 146 MG/DL
GFR SERPL CREATININE-BSD FRML MDRD: 69 ML/MIN/1.73SQ M
GLUCOSE P FAST SERPL-MCNC: 126 MG/DL (ref 65–99)
HBA1C MFR BLD: 6.7 %
HCT VFR BLD AUTO: 40.2 % (ref 36.5–49.3)
HDLC SERPL-MCNC: 42 MG/DL
HGB BLD-MCNC: 12.6 G/DL (ref 12–17)
IMM GRANULOCYTES # BLD AUTO: 0.01 THOUSAND/UL (ref 0–0.2)
IMM GRANULOCYTES NFR BLD AUTO: 0 % (ref 0–2)
LDLC SERPL CALC-MCNC: 84 MG/DL (ref 0–100)
LYMPHOCYTES # BLD AUTO: 0.91 THOUSANDS/ÂΜL (ref 0.6–4.47)
LYMPHOCYTES NFR BLD AUTO: 17 % (ref 14–44)
MCH RBC QN AUTO: 29.9 PG (ref 26.8–34.3)
MCHC RBC AUTO-ENTMCNC: 31.3 G/DL (ref 31.4–37.4)
MCV RBC AUTO: 96 FL (ref 82–98)
MONOCYTES # BLD AUTO: 0.53 THOUSAND/ÂΜL (ref 0.17–1.22)
MONOCYTES NFR BLD AUTO: 10 % (ref 4–12)
NEUTROPHILS # BLD AUTO: 3.6 THOUSANDS/ÂΜL (ref 1.85–7.62)
NEUTS SEG NFR BLD AUTO: 66 % (ref 43–75)
NONHDLC SERPL-MCNC: 108 MG/DL
NRBC BLD AUTO-RTO: 0 /100 WBCS
PLATELET # BLD AUTO: 196 THOUSANDS/UL (ref 149–390)
PMV BLD AUTO: 11.3 FL (ref 8.9–12.7)
POTASSIUM SERPL-SCNC: 4.4 MMOL/L (ref 3.5–5.3)
PROT SERPL-MCNC: 6.9 G/DL (ref 6.4–8.4)
PSA SERPL-MCNC: <0.01 NG/ML (ref 0–4)
RBC # BLD AUTO: 4.21 MILLION/UL (ref 3.88–5.62)
SODIUM SERPL-SCNC: 140 MMOL/L (ref 135–147)
TRIGL SERPL-MCNC: 118 MG/DL
WBC # BLD AUTO: 5.4 THOUSAND/UL (ref 4.31–10.16)

## 2023-08-28 PROCEDURE — 36415 COLL VENOUS BLD VENIPUNCTURE: CPT

## 2023-08-28 PROCEDURE — 84153 ASSAY OF PSA TOTAL: CPT

## 2023-09-05 DIAGNOSIS — E78.00 PURE HYPERCHOLESTEROLEMIA: ICD-10-CM

## 2023-09-05 RX ORDER — ATORVASTATIN CALCIUM 10 MG/1
TABLET, FILM COATED ORAL
Qty: 90 TABLET | Refills: 3 | Status: SHIPPED | OUTPATIENT
Start: 2023-09-05

## 2023-10-02 ENCOUNTER — HOSPITAL ENCOUNTER (OUTPATIENT)
Dept: NON INVASIVE DIAGNOSTICS | Facility: CLINIC | Age: 88
Discharge: HOME | End: 2023-10-02
Payer: MEDICARE

## 2023-10-02 ENCOUNTER — OFFICE VISIT (OUTPATIENT)
Dept: FAMILY MEDICINE CLINIC | Facility: CLINIC | Age: 88
End: 2023-10-02
Payer: MEDICARE

## 2023-10-02 ENCOUNTER — TELEPHONE (OUTPATIENT)
Dept: FAMILY MEDICINE CLINIC | Facility: CLINIC | Age: 88
End: 2023-10-02

## 2023-10-02 VITALS
HEIGHT: 70 IN | HEART RATE: 44 BPM | BODY MASS INDEX: 23.48 KG/M2 | DIASTOLIC BLOOD PRESSURE: 72 MMHG | SYSTOLIC BLOOD PRESSURE: 124 MMHG | WEIGHT: 164 LBS | TEMPERATURE: 97.8 F | OXYGEN SATURATION: 98 %

## 2023-10-02 DIAGNOSIS — R60.0 LOWER EXTREMITY EDEMA: ICD-10-CM

## 2023-10-02 DIAGNOSIS — R60.0 LOWER EXTREMITY EDEMA: Primary | ICD-10-CM

## 2023-10-02 PROCEDURE — 93970 EXTREMITY STUDY: CPT | Performed by: SURGERY

## 2023-10-02 PROCEDURE — 99213 OFFICE O/P EST LOW 20 MIN: CPT | Performed by: STUDENT IN AN ORGANIZED HEALTH CARE EDUCATION/TRAINING PROGRAM

## 2023-10-02 PROCEDURE — 93970 EXTREMITY STUDY: CPT

## 2023-10-02 NOTE — ASSESSMENT & PLAN NOTE
Left LOW, onset  Patient with the following risk factors for DVT: advanced age, unilateral LE edema, hx of prostate cancer (in remission since 2005).   WELLS score: 3  STAT referral - MA scheduled appointment for patient at [facility] for LE doppler  Discussed ED precautions; will follow up w/ patient pending results

## 2023-10-02 NOTE — PROGRESS NOTES
Name: Derek Gambino Sr.      : 1934      MRN: [id number]  Encounter Provider: Janae Alejandra MD  Encounter Date: 10/2/2023   Encounter department: [address] [place] [address]. Lower extremity edema  Assessment & Plan:  Left LOW, onset  Patient with the following risk factors for DVT: advanced age, unilateral LE edema, hx of prostate cancer (in remission since ). WELLS score: 3  STAT referral - MA scheduled appointment for patient at [facility] for LE doppler  Discussed ED precautions; will follow up w/ patient pending results    Orders:  -     VAS lower limb venous duplex study, complete bilateral; Future; Expected date: 10/02/2023         Subjective     This is a very pleasant 80 y.o. male who presents to the clinic for left lower extremity edema for the past 3 days. Patient reports he suffered a left hip injury (has prosthetic hip) after falling. He reports he was mowing the lawn on Saturday after which he noticed progressively worsening LE edema. He reports 10/10 pain with ambulation. Pain subsides with rest however he reports edema is constant despite elevated his LE throughout the night. Denies hx of blood clot. Has history of prostate cancer, in remission (on Eliguard) and follows with oncology regularly. Patient is on aspirin and Lipitor. Review of Systems   Constitutional: Negative for chills and fever. HENT: Negative for congestion, rhinorrhea and sinus pressure. Respiratory: Negative for chest tightness, shortness of breath and wheezing. Cardiovascular: Negative for chest pain and palpitations. Gastrointestinal: Negative for abdominal pain, nausea and vomiting. Endocrine: Negative for polyuria. Genitourinary: Negative for difficulty urinating, dysuria, frequency and urgency. Musculoskeletal: Positive for myalgias (cald swelling and tenderness). Neurological: Negative for dizziness, syncope and light-headedness.    Psychiatric/Behavioral: Negative for dysphoric mood.        Past Medical History:   Diagnosis Date   • Basal cell carcinoma    • Benign polyp of large intestine    • Cancer (HCC)    • Diabetes mellitus ([address]) 2010   • Osteoarthritis of left hip     last assessed [id number]   • Piriformis syndrome of left side     last assessed 18Oct2016     Past Surgical History:   Procedure Laterality Date   • APPENDECTOMY  1966   • CARDIAC PACEMAKER PLACEMENT  05/01/2005   • CATARACT EXTRACTION Left 10/01/1995   • CATARACT EXTRACTION Right 02/01/2000   • CORONARY ANGIOPLASTY  01/01/2010 2010   • CORONARY ANGIOPLASTY WITH STENT PLACEMENT  11/01/2002   • EYE SURGERY  Cataract  2000 & 2002   • INGUINAL HERNIA REPAIR Right 01/01/1998   • INGUINAL HERNIA REPAIR Left 01/01/2009 2009   • JOINT REPLACEMENT  2010 &  2017   • LUMBAR LAMINECTOMY  01/01/2002 2002   • REPLACEMENT TOTAL KNEE Right 06/01/2010    right total knee replacement with complications   • RHIZOTOMY     • TONSILLECTOMY AND ADENOIDECTOMY  01/01/1937    1937   • TOTAL HIP ARTHROPLASTY Left 01/18/2017     Family History   Problem Relation Age of Onset   • Coronary artery disease Mother    • Diabetes Maternal Grandmother      Social History     Socioeconomic History   • Marital status: /Civil Union     Spouse name: None   • Number of children: None   • Years of education: None   • Highest education level: None   Occupational History   • None   Tobacco Use   • Smoking status: Never   • Smokeless tobacco: Never   Vaping Use   • Vaping Use: Never used   Substance and Sexual Activity   • Alcohol use: No   • Drug use: Never   • Sexual activity: Not Currently     Partners: Female     Birth control/protection: None   Other Topics Concern   • None   Social History Narrative   • None     Social Determinants of Health     Financial Resource Strain: Low Risk  (3/28/2023)    Overall Financial Resource Strain (CARDIA)    • Difficulty of Paying Living Expenses: Not very hard   Food Insecurity: Not on file   Transportation Needs: No Transportation Needs (3/28/2023)    PRAPARE - Transportation    • Lack of Transportation (Medical): No    • Lack of Transportation (Non-Medical): No   Physical Activity: Not on file   Stress: Not on file   Social Connections: Not on file   Intimate Partner Violence: Not on file   Housing Stability: Not on file     Current Outpatient Medications on File Prior to Visit   Medication Sig   • aspirin 81 MG tablet Take 1 tablet by mouth daily Except sun    • atorvastatin (LIPITOR) 10 mg tablet TAKE 1 TABLET DAILY   • cephalexin (KEFLEX) 500 mg capsule For dental visits   • glimepiride (AMARYL) 1 mg tablet Take 1 tablet (1 mg total) by mouth daily   • glucose blood (OneTouch Ultra) test strip Patient tests once daily. • Lancets (OneTouch Delica Plus [id number]) MISC Use 1 application daily   • leuprolide (Eligard) 22.5 mg Inject 22.5 mg under the skin every 3 (three) months   • metoprolol tartrate (LOPRESSOR) 25 mg tablet Take 1 tablet (25 mg total) by mouth every evening   • metoprolol tartrate (LOPRESSOR) 50 mg tablet Take 1 tablet (50 mg total) by mouth every morning   • Multiple Vitamins-Minerals (MULTIVITAMIN ADULT PO) Take 1 tablet by mouth every other day.      Allergies   Allergen Reactions   • Penicillins Rash   • Plavix [Clopidogrel] Rash     Immunization History   Administered Date(s) Administered   • COVID-19 MODERNA VACC 0.5 ML IM 01/19/2021, 02/16/2021, 10/21/2021   • COVID-19 Moderna Vac BIVALENT 12 Yr+ IM 0.5 ML 09/10/2022   • COVID-19 Moderna vac 6-11y or adult booster 50 mcg/0.5 mL 04/13/2022   • INFLUENZA 09/26/2022   • Influenza Split High Dose Preservative Free IM 12/04/2012, 12/13/2013, 10/01/2014, 09/24/2015, 11/09/2016, 11/02/2017   • Influenza, high dose seasonal 0.7 mL 10/26/2018, 10/08/2019, 10/14/2020, 11/17/2021, 09/26/2022   • Pneumococcal Conjugate 13-Valent 02/23/2016   • Pneumococcal Polysaccharide PPV23 12/13/2011   • Zoster 05/30/2013   • Zoster Vaccine Recombinant 06/12/2019, 08/15/2019       Objective     /72 (BP Location: Left arm, Patient Position: Sitting, Cuff Size: Large)   Pulse (!) 44   Temp 97.8 °F (36.6 °C)   Ht 5' 10" (1.778 m)   Wt 74.4 kg (164 lb)   SpO2 98%   BMI 23.53 kg/m²     Physical Exam  Constitutional:       Appearance: Normal appearance. HENT:      Head: Normocephalic and atraumatic. Eyes:      Conjunctiva/sclera: Conjunctivae normal.   Cardiovascular:      Rate and Rhythm: Normal rate and regular rhythm. Pulses: Normal pulses. Heart sounds: Normal heart sounds. Pulmonary:      Effort: Pulmonary effort is normal.      Breath sounds: Normal breath sounds. Abdominal:      General: There is no distension. Musculoskeletal:         General: Normal range of motion. Cervical back: Normal range of motion and neck supple. Right lower leg: Edema (trace) present. Left lower leg: Tenderness (left calf along deep venous system) present. Edema (2+ pitting) present. Comments: Calf right side measurement: 35 cm  Calf left side measurement: 36 cm   Neurological:      Mental Status: He is alert and oriented to person, place, and time. Psychiatric:         Behavior: Behavior normal.         Thought Content:  Thought content normal.       Lucille Calvert MD

## 2023-10-02 NOTE — TELEPHONE ENCOUNTER
Made patient appt for 12:00 today , STAT Doppler at [facility] , 2nd floor Heart/ Vascular dept.

## 2023-10-03 ENCOUNTER — RA CDI HCC (OUTPATIENT)
Dept: OTHER | Facility: HOSPITAL | Age: 88
End: 2023-10-03

## 2023-10-03 NOTE — PROGRESS NOTES
[facility] coding opportunities       Chart reviewed, no opportunity found: CHART REVIEWED, NO OPPORTUNITY FOUND        Patients Insurance     Medicare Insurance: Medicare

## 2023-10-09 ENCOUNTER — OFFICE VISIT (OUTPATIENT)
Dept: FAMILY MEDICINE CLINIC | Facility: CLINIC | Age: 88
End: 2023-10-09
Payer: MEDICARE

## 2023-10-09 VITALS
DIASTOLIC BLOOD PRESSURE: 72 MMHG | TEMPERATURE: 97.5 F | RESPIRATION RATE: 20 BRPM | SYSTOLIC BLOOD PRESSURE: 122 MMHG | BODY MASS INDEX: 23.55 KG/M2 | HEART RATE: 74 BPM | WEIGHT: 164.5 LBS | OXYGEN SATURATION: 100 % | HEIGHT: 70 IN

## 2023-10-09 DIAGNOSIS — E11.9 TYPE 2 DIABETES MELLITUS WITHOUT COMPLICATION, WITHOUT LONG-TERM CURRENT USE OF INSULIN (HCC): Primary | ICD-10-CM

## 2023-10-09 DIAGNOSIS — C61 PROSTATE CANCER (HCC): ICD-10-CM

## 2023-10-09 DIAGNOSIS — I25.10 CORONARY ARTERY DISEASE INVOLVING NATIVE CORONARY ARTERY OF NATIVE HEART WITHOUT ANGINA PECTORIS: ICD-10-CM

## 2023-10-09 DIAGNOSIS — I49.5 SSS (SICK SINUS SYNDROME) (HCC): ICD-10-CM

## 2023-10-09 DIAGNOSIS — E78.00 PURE HYPERCHOLESTEROLEMIA: ICD-10-CM

## 2023-10-09 DIAGNOSIS — Z23 ENCOUNTER FOR IMMUNIZATION: ICD-10-CM

## 2023-10-09 DIAGNOSIS — I10 PRIMARY HYPERTENSION: ICD-10-CM

## 2023-10-09 DIAGNOSIS — I35.1 NONRHEUMATIC AORTIC VALVE INSUFFICIENCY: ICD-10-CM

## 2023-10-09 PROBLEM — R60.0 LOWER EXTREMITY EDEMA: Status: RESOLVED | Noted: 2023-10-02 | Resolved: 2023-10-09

## 2023-10-09 PROCEDURE — 99214 OFFICE O/P EST MOD 30 MIN: CPT | Performed by: FAMILY MEDICINE

## 2023-10-09 RX ORDER — ACETAMINOPHEN 325 MG/1
650 TABLET ORAL EVERY 6 HOURS PRN
COMMUNITY

## 2023-10-09 NOTE — PROGRESS NOTES
Name: Lloyd Rudolph Sr.      : 1934      MRN: [id number]  Encounter Provider: Nico Pelayo MD  Encounter Date: 10/9/2023   Encounter department: [address] [place] [address]     1. Type 2 diabetes mellitus without complication, without long-term current use of insulin (HCC)  -     Hemoglobin A1C  -     Albumin / creatinine urine ratio    2. Primary hypertension  -     Comprehensive metabolic panel  -     CBC and differential    3. Pure hypercholesterolemia  -     Lipid panel    4. Coronary artery disease involving native coronary artery of native heart without angina pectoris    5. Prostate cancer ([address])    6. SSS (sick sinus syndrome) ([address])    7. Nonrheumatic aortic valve insufficiency    8. Encounter for immunization  -     influenza vaccine, high-dose, PF 0.7 mL (FLUZONE HIGH-DOSE)    Continue with current medications. Office visit 6 months with repeat labs. Flu vaccine today. Subjective     Follow up visit. Medications reviewed Type 2 diabetes mellitus. On Glimepiride 1 mg daily. 2023 . A1c 6.7. 2023  albumin 7.0 Not on ACE or ARB. No hypoglycemic events. No neuropathy symptoms. He is followed by Podiatry. Current with eye exam 2023 no diabetic retinopathy . Hypertension blood pressures have been stable on Metoprolol tartrate 50 mg in AM and 25 mg in PM. 2023 Creatinine 0.97. GFR 69. Electrolytes normal.  Hgb 12. 6. Hyperlipidemia and CAD on Atorvastatin 10 mg daily lipid profile  2023 cholesterol 150. TGs 118. HDL 42. LDL 84. LFTs normal . 2019 TSH 1.78.      Recent Results (from the past 1344 hour(s))   Comprehensive metabolic panel    Collection Time: 23  9:33 AM   Result Value Ref Range    Sodium 140 135 - 147 mmol/L    Potassium 4.4 3.5 - 5.3 mmol/L    Chloride 106 96 - 108 mmol/L    CO2 26 21 - 32 mmol/L    ANION GAP 8 mmol/L    BUN 21 5 - 25 mg/dL    Creatinine 0.97 0.60 - 1.30 mg/dL    Glucose, Fasting 126 (H) 65 - 99 mg/dL Calcium 9.8 8.4 - 10.2 mg/dL    AST 22 13 - 39 U/L    ALT 16 7 - 52 U/L    Alkaline Phosphatase 55 34 - 104 U/L    Total Protein 6.9 6.4 - 8.4 g/dL    Albumin 4.1 3.5 - 5.0 g/dL    Total Bilirubin 0.53 0.20 - 1.00 mg/dL    eGFR 69 ml/min/1.73sq m   CBC and differential    Collection Time: 08/28/23  9:33 AM   Result Value Ref Range    WBC 5.40 4.31 - 10.16 Thousand/uL    RBC 4.21 3.88 - 5.62 Million/uL    Hemoglobin 12.6 12.0 - 17.0 g/dL    Hematocrit 40.2 36.5 - 49.3 %    MCV 96 82 - 98 fL    MCH 29.9 26.8 - 34.3 pg    MCHC 31.3 (L) 31.4 - 37.4 g/dL    RDW 12.9 11.6 - 15.1 %    MPV 11.3 8.9 - 12.7 fL    Platelets 430 033 - 679 Thousands/uL    nRBC 0 /100 WBCs    Neutrophils Relative 66 43 - 75 %    Immat GRANS % 0 0 - 2 %    Lymphocytes Relative 17 14 - 44 %    Monocytes Relative 10 4 - 12 %    Eosinophils Relative 6 0 - 6 %    Basophils Relative 1 0 - 1 %    Neutrophils Absolute 3.60 1.85 - 7.62 Thousands/µL    Immature Grans Absolute 0.01 0.00 - 0.20 Thousand/uL    Lymphocytes Absolute 0.91 0.60 - 4.47 Thousands/µL    Monocytes Absolute 0.53 0.17 - 1.22 Thousand/µL    Eosinophils Absolute 0.31 0.00 - 0.61 Thousand/µL    Basophils Absolute 0.04 0.00 - 0.10 Thousands/µL   Lipid panel    Collection Time: 08/28/23  9:33 AM   Result Value Ref Range    Cholesterol 150 See Comment mg/dL    Triglycerides 118 See Comment mg/dL    HDL, Direct 42 >=40 mg/dL    LDL Calculated 84 0 - 100 mg/dL    Non-HDL-Chol (CHOL-HDL) 108 mg/dl   Hemoglobin A1C    Collection Time: 08/28/23  9:33 AM   Result Value Ref Range    Hemoglobin A1C 6.7 (H) Normal 4.0-5.6%; PreDiabetic 5.7-6.4%; Diabetic >=6.5%; Glycemic control for adults with diabetes <7.0% %     mg/dl   PSA Total, Diagnostic    Collection Time: 08/28/23  9:33 AM   Result Value Ref Range    PSA, Diagnostic <0.01 0.00 - 4.00 ng/mL           Review of Systems   Constitutional: Negative for appetite change, chills, fever and unexpected weight change.    HENT: Negative for congestion, ear pain, rhinorrhea, sore throat and trouble swallowing. Eyes: Negative for visual disturbance. Respiratory: Negative for cough, shortness of breath and wheezing. Cardiovascular: Positive for leg swelling (LE swelling improved with compression stockings. 08/2023 venous dopplers normal. ). Negative for chest pain and palpitations. CAD s/p stents. S/p pacemaker. 02/2018. Echocardiogram normal left ventricular systolic function. EF 55%. No regional wall motion abnormalities. Grade 1 diastolic dysfunction. mild mitral regurgitation. Mild aortic regurgitation. Trace TR/NJ. No pericardial effusion. Aortic root normal size. Gastrointestinal: Negative for abdominal pain, blood in stool, constipation, diarrhea, nausea and vomiting. Colonoscopy 04/2021   Endocrine: Negative for polydipsia and polyuria. Genitourinary: Negative for difficulty urinating. Prostate CA s/p XRT 2006. He is followed by Urology. He has been receiving ADT injections for elevated PSA     Lab Results       Component                Value               Date                       PSA                      <0.01               08/28/2023                 PSA                      0.03                05/17/2023                 PSA                      <0.1                02/08/2023                                                       Musculoskeletal: Negative for arthralgias, gait problem and myalgias. OA S/p left THR 01/2017. Skin: Negative for rash. History of BCCs/SCCs followed by Dermatology   Allergic/Immunologic: Negative for environmental allergies. Neurological: Negative for dizziness and headaches. Hematological: Negative for adenopathy. Does not bruise/bleed easily.         Lab Results       Component                Value               Date                       WBC                      5.40                08/28/2023                 HGB                      12.6 08/28/2023                 HCT                      40.2                08/28/2023                 MCV                      96                  08/28/2023                 PLT                      196                 08/28/2023                      Hemoglobin       Date                     Value               Ref Range           Status                12/02/2022               11.8 (L)            12.0 - 17.0 g/*     Final                 09/27/2022               11.2 (L)            12.0 - 17.0 g/*     Final                 08/04/2022               12.4                12.0 - 17.0 g/*     Final                 12/22/2015               13.3                12.0 - 17.0 g/*     Final                 02/20/2015               13.2                12.0 - 17.0 g/*     Final                 11/22/2014               14.3                12.0 - 17.0 g/*     Final                 Psychiatric/Behavioral: Negative for dysphoric mood and sleep disturbance.        Past Medical History:   Diagnosis Date   • Basal cell carcinoma    • Benign polyp of large intestine    • Cancer (HCC)    • Diabetes mellitus ([address]) 2010   • Osteoarthritis of left hip     last assessed [id number]   • Piriformis syndrome of left side     last assessed 18Oct2016     Past Surgical History:   Procedure Laterality Date   • APPENDECTOMY  1966   • CARDIAC PACEMAKER PLACEMENT  05/01/2005   • CATARACT EXTRACTION Left 10/01/1995   • CATARACT EXTRACTION Right 02/01/2000   • CORONARY ANGIOPLASTY  01/01/2010 2010   • CORONARY ANGIOPLASTY WITH STENT PLACEMENT  11/01/2002   • EYE SURGERY  Cataract  2000 & 2002   • INGUINAL HERNIA REPAIR Right 01/01/1998   • INGUINAL HERNIA REPAIR Left 01/01/2009 2009   • JOINT REPLACEMENT  2010 &  2017   • LUMBAR LAMINECTOMY  01/01/2002 2002   • REPLACEMENT TOTAL KNEE Right 06/01/2010    right total knee replacement with complications   • RHIZOTOMY     • TONSILLECTOMY AND ADENOIDECTOMY  01/01/1937    1937   • TOTAL HIP ARTHROPLASTY Left 01/18/2017     Family History   Problem Relation Age of Onset   • Coronary artery disease Mother    • Diabetes Maternal Grandmother      Social History     Socioeconomic History   • Marital status: /Civil Union     Spouse name: None   • Number of children: None   • Years of education: None   • Highest education level: None   Occupational History   • None   Tobacco Use   • Smoking status: Never   • Smokeless tobacco: Never   Vaping Use   • Vaping Use: Never used   Substance and Sexual Activity   • Alcohol use: No   • Drug use: Never   • Sexual activity: Not Currently     Partners: Female     Birth control/protection: None   Other Topics Concern   • None   Social History Narrative   • None     Social Determinants of Health     Financial Resource Strain: Low Risk  (3/28/2023)    Overall Financial Resource Strain (CARDIA)    • Difficulty of Paying Living Expenses: Not very hard   Food Insecurity: Not on file   Transportation Needs: No Transportation Needs (3/28/2023)    PRAPARE - Transportation    • Lack of Transportation (Medical): No    • Lack of Transportation (Non-Medical): No   Physical Activity: Not on file   Stress: Not on file   Social Connections: Not on file   Intimate Partner Violence: Not on file   Housing Stability: Not on file     Current Outpatient Medications on File Prior to Visit   Medication Sig   • acetaminophen (TYLENOL) 325 mg tablet Take 650 mg by mouth every 6 (six) hours as needed for mild pain   • aspirin 81 MG tablet Take 1 tablet by mouth daily Except sun    • atorvastatin (LIPITOR) 10 mg tablet TAKE 1 TABLET DAILY   • cephalexin (KEFLEX) 500 mg capsule For dental visits   • glimepiride (AMARYL) 1 mg tablet Take 1 tablet (1 mg total) by mouth daily   • glucose blood (OneTouch Ultra) test strip Patient tests once daily.    • Lancets (OneTouch Delica Plus [id number]) MISC Use 1 application daily   • leuprolide (Eligard) 22.5 mg Inject 22.5 mg under the skin every 3 (three) months   • metoprolol tartrate (LOPRESSOR) 25 mg tablet Take 1 tablet (25 mg total) by mouth every evening   • metoprolol tartrate (LOPRESSOR) 50 mg tablet Take 1 tablet (50 mg total) by mouth every morning   • Multiple Vitamins-Minerals (MULTIVITAMIN ADULT PO) Take 1 tablet by mouth every other day. Allergies   Allergen Reactions   • Penicillins Rash   • Plavix [Clopidogrel] Rash     Immunization History   Administered Date(s) Administered   • COVID-19 MODERNA VACC 0.5 ML IM 01/19/2021, 02/16/2021, 10/21/2021   • COVID-19 Moderna Vac BIVALENT 12 Yr+ IM 0.5 ML 09/10/2022   • COVID-19 Moderna vac 6-11y or adult booster 50 mcg/0.5 mL 04/13/2022   • INFLUENZA 09/26/2022   • Influenza Split High Dose Preservative Free IM 12/04/2012, 12/13/2013, 10/01/2014, 09/24/2015, 11/09/2016, 11/02/2017   • Influenza, high dose seasonal 0.7 mL 10/26/2018, 10/08/2019, 10/14/2020, 11/17/2021, 09/26/2022   • Pneumococcal Conjugate 13-Valent 02/23/2016   • Pneumococcal Polysaccharide PPV23 12/13/2011   • Zoster 05/30/2013   • Zoster Vaccine Recombinant 06/12/2019, 08/15/2019       Objective     /72 (BP Location: Left arm, Patient Position: Sitting, Cuff Size: Large)   Pulse 74   Temp 97.5 °F (36.4 °C)   Resp 20   Ht 5' 10" (1.778 m)   Wt 74.6 kg (164 lb 8 oz)   SpO2 100%   BMI 23.60 kg/m²     BP Readings from Last 3 Encounters:   10/09/23 122/72   10/02/23 124/72   08/11/23 124/66     Wt Readings from Last 3 Encounters:   10/09/23 74.6 kg (164 lb 8 oz)   10/02/23 74.4 kg (164 lb)   08/11/23 73.7 kg (162 lb 8 oz)       Physical Exam  Vitals and nursing note reviewed. Constitutional:       General: He is not in acute distress. Appearance: He is well-developed. HENT:      Right Ear: Tympanic membrane and ear canal normal.      Left Ear: Tympanic membrane and ear canal normal.   Eyes:      General: No scleral icterus. Extraocular Movements: Extraocular movements intact.       Conjunctiva/sclera: Conjunctivae normal. Pupils: Pupils are equal, round, and reactive to light. Neck:      Thyroid: No thyroid mass or thyromegaly. Vascular: No carotid bruit or JVD. Trachea: No tracheal deviation. Cardiovascular:      Rate and Rhythm: Normal rate and regular rhythm. Pulses:           Carotid pulses are 2+ on the right side and 2+ on the left side. Heart sounds: Normal heart sounds. No murmur heard. No gallop. Comments: Trace to 1+ edema of lower extremities  Pulmonary:      Effort: Pulmonary effort is normal. No respiratory distress. Breath sounds: Normal breath sounds. No wheezing or rales. Musculoskeletal:      Right lower leg: Edema present. Left lower leg: Edema present. Lymphadenopathy:      Cervical: No cervical adenopathy. Upper Body:      Right upper body: No supraclavicular adenopathy. Left upper body: No supraclavicular adenopathy. Skin:     Findings: No rash. Nails: There is no clubbing. Neurological:      General: No focal deficit present. Mental Status: He is alert and oriented to person, place, and time.    Psychiatric:         Mood and Affect: Mood normal.       Kj Manzano MD

## 2023-10-10 ENCOUNTER — TELEPHONE (OUTPATIENT)
Dept: ADMINISTRATIVE | Facility: OTHER | Age: 88
End: 2023-10-10

## 2023-10-10 NOTE — TELEPHONE ENCOUNTER
Upon review of the In Basket request and the patient's chart, initial outreach has been made via fax to facility. Please see Contacts section for details.      Thank you  Barbara Garzon

## 2023-10-10 NOTE — LETTER
Diabetic Eye Exam Form    Date Requested: 10/10/23  Patient: Alan Packer Sr.  Patient : 1934   Referring Provider: Charleen Trinidad MD      DIABETIC Eye Exam Date _______________________________      Type of Exam MUST be documented for Diabetic Eye Exams. Please CHECK ONE. Retinal Exam       Dilated Retinal Exam       OCT       Optomap-Iris Exam      Fundus Photography       Left Eye - Please check Retinopathy or No Retinopathy        Exam did show retinopathy    Exam did not show retinopathy       Right Eye - Please check Retinopathy or No Retinopathy       Exam did show retinopathy    Exam did not show retinopathy       Comments __________________________________________________________    Practice Providing Exam ______________________________________________    Exam Performed By (print name) _______________________________________      Provider Signature ___________________________________________________      These reports are needed for  compliance. Please fax this completed form and a copy of the Diabetic Eye Exam report to our office located at [address] [place] [address] as soon as possible via Fax [phone] attention Suha Reeves: Phone [phone]  We thank you for your assistance in treating our mutual patient.

## 2023-10-10 NOTE — LETTER
Diabetic Eye Exam Form    Date Requested: 10/16/23  Patient: Christine Fair Sr.  Patient : 1934   Referring Provider: Fabian Miller MD      DIABETIC Eye Exam Date _______________________________      Type of Exam MUST be documented for Diabetic Eye Exams. Please CHECK ONE. Retinal Exam       Dilated Retinal Exam       OCT       Optomap-Iris Exam      Fundus Photography       Left Eye - Please check Retinopathy or No Retinopathy        Exam did show retinopathy    Exam did not show retinopathy       Right Eye - Please check Retinopathy or No Retinopathy       Exam did show retinopathy    Exam did not show retinopathy       Comments __________________________________________________________    Practice Providing Exam ______________________________________________    Exam Performed By (print name) _______________________________________      Provider Signature ___________________________________________________      These reports are needed for  compliance. Please fax this completed form and a copy of the Diabetic Eye Exam report to our office located at [address] [place] [address] as soon as possible via Fax [phone] attention Lucille Saldaña: Phone [phone]  We thank you for your assistance in treating our mutual patient.

## 2023-10-10 NOTE — TELEPHONE ENCOUNTER
----- Message from Bulmaro Galeas sent at 10/9/2023  3:37 PM EDT -----  Regarding: Diabetic Eye Exam  10/09/23 3:38 PM    Hello, our patient No patient name on file. has had Diabetic Eye Exam completed/performed. Please assist in updating the patient chart by pulling the document from the Media Tab. The date of service is 04/2023.      Thank you,  Bulmaro [facility]

## 2023-10-16 NOTE — TELEPHONE ENCOUNTER
As a follow-up, a second attempt has been made for outreach via fax to facility. Please see Contacts section for details.     Thank you  Melody Minor

## 2023-10-23 NOTE — TELEPHONE ENCOUNTER
Upon review of the In Basket request we have found/obtained the documentation. After careful review of the document we are unable to complete this request for Diabetic Eye Exam because the documentation does not have the result(s) needed to close the requested care gap(s). Any additional questions or concerns should be emailed to the Practice Liaisons via the appropriate education email address, please do not reply via In Basket.     Thank you  Chrissie Ramon

## 2023-11-13 ENCOUNTER — REMOTE DEVICE CLINIC VISIT (OUTPATIENT)
Dept: CARDIOLOGY CLINIC | Facility: CLINIC | Age: 88
End: 2023-11-13
Payer: MEDICARE

## 2023-11-13 DIAGNOSIS — Z95.0 PRESENCE OF PERMANENT CARDIAC PACEMAKER: Primary | ICD-10-CM

## 2023-11-13 PROCEDURE — 93296 REM INTERROG EVL PM/IDS: CPT | Performed by: INTERNAL MEDICINE

## 2023-11-13 PROCEDURE — 93294 REM INTERROG EVL PM/LDLS PM: CPT | Performed by: INTERNAL MEDICINE

## 2023-11-13 NOTE — PROGRESS NOTES
MDT DUAL PPM - NOT MRI CONDITIONAL  CARELINK TRANSMISSION:  BATTERY VOLTAGE ADEQUATE (4.5 YR.). AP 95.7%  97.5% (>40%/AVB/DDDR 60 PPM, -180 MS). ALL AVAILABLE LEAD PARAMETERS WITHIN NORMAL LIMITS. NO SIGNIFICANT HIGH RATE EPISODES. NORMAL DEVICE FUNCTION.   RG

## 2023-11-21 ENCOUNTER — TELEPHONE (OUTPATIENT)
Dept: FAMILY MEDICINE CLINIC | Facility: CLINIC | Age: 88
End: 2023-11-21

## 2023-11-21 NOTE — TELEPHONE ENCOUNTER
----- Message from Rosamaria Wyman MD sent at 11/8/2023 10:25 AM EST -----  Call patient can we find out who is Podiatrist is-diabetic need a copy of last diabetic foot exam for Care Gap

## 2023-11-24 NOTE — TELEPHONE ENCOUNTER
Called patient Dr Lis Vasquez with [facility] [place], [place] 261[address]   fax : [phone] . Penelope Roca  Request faxed to Podiatry office

## 2023-11-28 ENCOUNTER — APPOINTMENT (OUTPATIENT)
Dept: LAB | Facility: CLINIC | Age: 88
End: 2023-11-28
Payer: MEDICARE

## 2023-11-28 LAB
ALBUMIN SERPL BCP-MCNC: 4 G/DL (ref 3.5–5)
ALP SERPL-CCNC: 63 U/L (ref 34–104)
ALT SERPL W P-5'-P-CCNC: 11 U/L (ref 7–52)
ANION GAP SERPL CALCULATED.3IONS-SCNC: 5 MMOL/L
AST SERPL W P-5'-P-CCNC: 15 U/L (ref 13–39)
BASOPHILS # BLD AUTO: 0.04 THOUSANDS/ÂΜL (ref 0–0.1)
BASOPHILS NFR BLD AUTO: 1 % (ref 0–1)
BILIRUB SERPL-MCNC: 0.56 MG/DL (ref 0.2–1)
BUN SERPL-MCNC: 21 MG/DL (ref 5–25)
CALCIUM SERPL-MCNC: 9.6 MG/DL (ref 8.4–10.2)
CHLORIDE SERPL-SCNC: 106 MMOL/L (ref 96–108)
CHOLEST SERPL-MCNC: 145 MG/DL
CO2 SERPL-SCNC: 29 MMOL/L (ref 21–32)
CREAT SERPL-MCNC: 0.94 MG/DL (ref 0.6–1.3)
CREAT UR-MCNC: 85.1 MG/DL
EOSINOPHIL # BLD AUTO: 0.24 THOUSAND/ÂΜL (ref 0–0.61)
EOSINOPHIL NFR BLD AUTO: 4 % (ref 0–6)
ERYTHROCYTE [DISTWIDTH] IN BLOOD BY AUTOMATED COUNT: 13 % (ref 11.6–15.1)
EST. AVERAGE GLUCOSE BLD GHB EST-MCNC: 140 MG/DL
GFR SERPL CREATININE-BSD FRML MDRD: 71 ML/MIN/1.73SQ M
GLUCOSE P FAST SERPL-MCNC: 129 MG/DL (ref 65–99)
HBA1C MFR BLD: 6.5 %
HCT VFR BLD AUTO: 38.9 % (ref 36.5–49.3)
HDLC SERPL-MCNC: 47 MG/DL
HGB BLD-MCNC: 12.1 G/DL (ref 12–17)
IMM GRANULOCYTES # BLD AUTO: 0.01 THOUSAND/UL (ref 0–0.2)
IMM GRANULOCYTES NFR BLD AUTO: 0 % (ref 0–2)
LDLC SERPL CALC-MCNC: 78 MG/DL (ref 0–100)
LYMPHOCYTES # BLD AUTO: 0.84 THOUSANDS/ÂΜL (ref 0.6–4.47)
LYMPHOCYTES NFR BLD AUTO: 15 % (ref 14–44)
MCH RBC QN AUTO: 29.4 PG (ref 26.8–34.3)
MCHC RBC AUTO-ENTMCNC: 31.1 G/DL (ref 31.4–37.4)
MCV RBC AUTO: 95 FL (ref 82–98)
MICROALBUMIN UR-MCNC: <7 MG/L
MICROALBUMIN/CREAT 24H UR: <8 MG/G CREATININE (ref 0–30)
MONOCYTES # BLD AUTO: 0.55 THOUSAND/ÂΜL (ref 0.17–1.22)
MONOCYTES NFR BLD AUTO: 10 % (ref 4–12)
NEUTROPHILS # BLD AUTO: 3.9 THOUSANDS/ÂΜL (ref 1.85–7.62)
NEUTS SEG NFR BLD AUTO: 70 % (ref 43–75)
NONHDLC SERPL-MCNC: 98 MG/DL
NRBC BLD AUTO-RTO: 0 /100 WBCS
PLATELET # BLD AUTO: 242 THOUSANDS/UL (ref 149–390)
PMV BLD AUTO: 11 FL (ref 8.9–12.7)
POTASSIUM SERPL-SCNC: 4.3 MMOL/L (ref 3.5–5.3)
PROT SERPL-MCNC: 7.1 G/DL (ref 6.4–8.4)
RBC # BLD AUTO: 4.11 MILLION/UL (ref 3.88–5.62)
SODIUM SERPL-SCNC: 140 MMOL/L (ref 135–147)
TRIGL SERPL-MCNC: 98 MG/DL
WBC # BLD AUTO: 5.58 THOUSAND/UL (ref 4.31–10.16)

## 2023-12-01 ENCOUNTER — APPOINTMENT (OUTPATIENT)
Dept: LAB | Facility: CLINIC | Age: 88
End: 2023-12-01
Payer: MEDICARE

## 2023-12-01 DIAGNOSIS — R31.9 HEMATURIA, UNSPECIFIED TYPE: ICD-10-CM

## 2023-12-01 DIAGNOSIS — C61 PROSTATE CANCER (HCC): ICD-10-CM

## 2023-12-01 LAB
BUN SERPL-MCNC: 18 MG/DL (ref 5–25)
CREAT SERPL-MCNC: 0.87 MG/DL (ref 0.6–1.3)
GFR SERPL CREATININE-BSD FRML MDRD: 76 ML/MIN/1.73SQ M
PSA SERPL-MCNC: 0.05 NG/ML (ref 0–4)

## 2023-12-01 PROCEDURE — 82565 ASSAY OF CREATININE: CPT

## 2023-12-01 PROCEDURE — 84153 ASSAY OF PSA TOTAL: CPT

## 2023-12-01 PROCEDURE — 36415 COLL VENOUS BLD VENIPUNCTURE: CPT

## 2023-12-01 PROCEDURE — 84520 ASSAY OF UREA NITROGEN: CPT

## 2023-12-07 ENCOUNTER — APPOINTMENT (OUTPATIENT)
Dept: LAB | Facility: HOSPITAL | Age: 88
End: 2023-12-07
Payer: MEDICARE

## 2023-12-07 PROCEDURE — 88112 CYTOPATH CELL ENHANCE TECH: CPT | Performed by: PATHOLOGY

## 2023-12-11 PROCEDURE — 88112 CYTOPATH CELL ENHANCE TECH: CPT | Performed by: PATHOLOGY

## 2023-12-23 ENCOUNTER — APPOINTMENT (EMERGENCY)
Dept: RADIOLOGY | Facility: HOSPITAL | Age: 88
End: 2023-12-23
Payer: MEDICARE

## 2023-12-23 ENCOUNTER — HOSPITAL ENCOUNTER (EMERGENCY)
Facility: HOSPITAL | Age: 88
Discharge: HOME | End: 2023-12-23
Attending: EMERGENCY MEDICINE | Admitting: EMERGENCY MEDICINE
Payer: MEDICARE

## 2023-12-23 VITALS
HEART RATE: 64 BPM | SYSTOLIC BLOOD PRESSURE: 172 MMHG | TEMPERATURE: 98.3 F | RESPIRATION RATE: 17 BRPM | OXYGEN SATURATION: 98 % | DIASTOLIC BLOOD PRESSURE: 79 MMHG

## 2023-12-23 DIAGNOSIS — M25.552 LEFT HIP PAIN: Primary | ICD-10-CM

## 2023-12-23 PROCEDURE — 73502 X-RAY EXAM HIP UNI 2-3 VIEWS: CPT

## 2023-12-23 PROCEDURE — 99284 EMERGENCY DEPT VISIT MOD MDM: CPT

## 2023-12-23 PROCEDURE — 99284 EMERGENCY DEPT VISIT MOD MDM: CPT | Performed by: EMERGENCY MEDICINE

## 2023-12-23 RX ORDER — ACETAMINOPHEN 325 MG/1
975 TABLET ORAL ONCE
Status: COMPLETED | OUTPATIENT
Start: 2023-12-23 | End: 2023-12-23

## 2023-12-23 RX ORDER — IBUPROFEN 400 MG/1
400 TABLET ORAL ONCE
Status: COMPLETED | OUTPATIENT
Start: 2023-12-23 | End: 2023-12-23

## 2023-12-23 RX ADMIN — IBUPROFEN 400 MG: 400 TABLET, FILM COATED ORAL at 18:13

## 2023-12-23 RX ADMIN — ACETAMINOPHEN 975 MG: 325 TABLET, FILM COATED ORAL at 18:13

## 2023-12-23 NOTE — ED ATTENDING ATTESTATION
12/23/2023  IClyde DO, saw and evaluated the patient. I have discussed the patient with the resident/non-physician practitioner and agree with the resident's/non-physician practitioner's findings, Plan of Care, and MDM as documented in the resident's/non-physician practitioner's note, except where noted. All available labs and Radiology studies were reviewed.  I was present for key portions of any procedure(s) performed by the resident/non-physician practitioner and I was immediately available to provide assistance.       At this point I agree with the current assessment done in the Emergency Department.  I have conducted an independent evaluation of this patient a history and physical is as follows: 89-year-old male, past medical history left hip arthroplasty, presenting with pain in the left hip after shifting in an odd way per him while standing and moving object.  See resident note for full details.    Hypertension, vital signs otherwise stable.  Patient resting comfortably.  Full range of motion at left hip, knee, ankle and 5 out of 5 strength in each 1 of these joints throughout full range of motion.  Mild tenderness per patient report in the muscle belly lateral to the left hip.  No overlying skin change.  No objective tenderness on my exam.  No gross deformity.    89-year-old male presenting with left hip pain now resolved after minimal analgesia in the emergency department.  X-ray without acute pathology identified.  Patient able to ambulate briskly and per him with 0 symptoms while in the emergency department.  Family on looking and states that his gait in the emergency department is his typical gait.  He will continue supportive care at home and if he should not have symptoms improve over the next 7 days, will contact his orthopedist for further evaluation. Reviewed all findings both relevant and incidental with the patient at bedside. Pt verbalized understanding of findings, neccesary follow up,  return to ED precautions. Pt agreed to review today's findings with their primary care provider. Pt non-toxic appearing upon discharge.       ED Course         Critical Care Time  Procedures

## 2023-12-23 NOTE — ED PROVIDER NOTES
"History  Chief Complaint   Patient presents with    Hip Pain     Pt was moving a table with wife and then felt pain/weakness L hip. Denies pain unless he moves. L hip is a replaced hip 7 years ago.      HPI    89 year old male with left hip arthroplasty in 2017 presents to the ED with left hip pain. Patient states he was moving a folding table and twisted his body without lifting his left leg which caused him pain and weakness. He denies falling or any trauma today. Patient states that he feels like his left leg is going to give out and is concerned \"hip socket is out of place\". Patient is currently on baby Aspirin. Denies numbness, tingling or decreased sensation in LLE.    Prior to Admission Medications   Prescriptions Last Dose Informant Patient Reported? Taking?   Lancets (OneTouch Delica Plus [id number]) MISC  Self No No   Sig: Use 1 application daily   Multiple Vitamins-Minerals (MULTIVITAMIN ADULT PO)  Self Yes No   Sig: Take 1 tablet by mouth every other day.   acetaminophen (TYLENOL) 325 mg tablet   Yes No   Sig: Take 650 mg by mouth every 6 (six) hours as needed for mild pain   aspirin 81 MG tablet  Self Yes No   Sig: Take 1 tablet by mouth daily Except sun    atorvastatin (LIPITOR) 10 mg tablet  Self No No   Sig: TAKE 1 TABLET DAILY   cephalexin (KEFLEX) 500 mg capsule  Self Yes No   Sig: For dental visits   glimepiride (AMARYL) 1 mg tablet  Self No No   Sig: Take 1 tablet (1 mg total) by mouth daily   glucose blood (OneTouch Ultra) test strip  Self No No   Sig: Patient tests once daily.   leuprolide (Eligard) 22.5 mg  Self Yes No   Sig: Inject 22.5 mg under the skin every 3 (three) months   metoprolol tartrate (LOPRESSOR) 25 mg tablet  Self No No   Sig: Take 1 tablet (25 mg total) by mouth every evening   metoprolol tartrate (LOPRESSOR) 50 mg tablet  Self No No   Sig: Take 1 tablet (50 mg total) by mouth every morning      Facility-Administered Medications: None       Past Medical History:   Diagnosis " Date    Basal cell carcinoma     Benign polyp of large intestine     Cancer (HCC)     Diabetes mellitus (HCC) 2010    Osteoarthritis of left hip     last assessed 09Jan2017    Piriformis syndrome of left side     last assessed 18Oct2016       Past Surgical History:   Procedure Laterality Date    APPENDECTOMY  1966    CARDIAC PACEMAKER PLACEMENT  05/01/2005    CATARACT EXTRACTION Left 10/01/1995    CATARACT EXTRACTION Right 02/01/2000    CORONARY ANGIOPLASTY  01/01/2010 2010    CORONARY ANGIOPLASTY WITH STENT PLACEMENT  11/01/2002    EYE SURGERY  Cataract  2000 & 2002    INGUINAL HERNIA REPAIR Right 01/01/1998    INGUINAL HERNIA REPAIR Left 01/01/2009    2009    JOINT REPLACEMENT  2010 &  2017    LUMBAR LAMINECTOMY  01/01/2002    2002    REPLACEMENT TOTAL KNEE Right 06/01/2010    right total knee replacement with complications    RHIZOTOMY      TONSILLECTOMY AND ADENOIDECTOMY  01/01/1937 1937    TOTAL HIP ARTHROPLASTY Left 01/18/2017       Family History   Problem Relation Age of Onset    Coronary artery disease Mother     Diabetes Maternal Grandmother      I have reviewed and agree with the history as documented.    E-Cigarette/Vaping    E-Cigarette Use Never User      E-Cigarette/Vaping Substances    Nicotine No     THC No     CBD No     Flavoring No      Social History     Tobacco Use    Smoking status: Never    Smokeless tobacco: Never   Vaping Use    Vaping status: Never Used   Substance Use Topics    Alcohol use: No    Drug use: Never        Review of Systems   Constitutional:  Negative for fatigue.   Respiratory:  Negative for shortness of breath.    Cardiovascular:  Negative for chest pain and leg swelling.   Gastrointestinal:  Negative for diarrhea, nausea and vomiting.   Musculoskeletal:  Positive for arthralgias.   Skin:  Negative for color change and pallor.   Neurological:  Positive for weakness. Negative for dizziness, light-headedness and numbness.   Hematological:  Does not bruise/bleed easily.        Physical Exam  ED Triage Vitals   Temperature Pulse Respirations Blood Pressure SpO2   12/23/23 1645 12/23/23 1645 12/23/23 1645 12/23/23 1645 12/23/23 1645   98.3 °F (36.8 °C) 64 17 (!) 172/79 98 %      Temp Source Heart Rate Source Patient Position - Orthostatic VS BP Location FiO2 (%)   12/23/23 1645 12/23/23 1645 12/23/23 1645 12/23/23 1645 --   Oral Monitor Sitting Left arm       Pain Score       12/23/23 1813       3             Orthostatic Vital Signs  Vitals:    12/23/23 1645   BP: (!) 172/79   Pulse: 64   Patient Position - Orthostatic VS: Sitting       Physical Exam  Constitutional:       General: He is not in acute distress.     Appearance: Normal appearance. He is not ill-appearing.   HENT:      Head: Normocephalic and atraumatic.   Cardiovascular:      Rate and Rhythm: Normal rate and regular rhythm.      Pulses: Normal pulses.      Heart sounds: Normal heart sounds.   Pulmonary:      Effort: Pulmonary effort is normal. No respiratory distress.      Breath sounds: Normal breath sounds.   Abdominal:      General: Abdomen is flat. Bowel sounds are normal.      Palpations: Abdomen is soft.   Musculoskeletal:         General: Tenderness (Mild left hip) present. No swelling or deformity. Normal range of motion.      Right hip: Normal.      Left hip: Bony tenderness present. No crepitus. Normal range of motion. Normal strength.      Right lower leg: No edema.      Left lower leg: No edema.   Neurological:      Mental Status: He is alert.         ED Medications  Medications   acetaminophen (TYLENOL) tablet 975 mg (975 mg Oral Given 12/23/23 1813)   ibuprofen (MOTRIN) tablet 400 mg (400 mg Oral Given 12/23/23 1813)       Diagnostic Studies  Results Reviewed       None                   XR hip/pelv 2-3 vws left if performed   ED Interpretation by Clyde Valdez DO (12/23 1831)   NAD. No fx, dislocation.             Procedures  Procedures      ED Course       89 year old male with left hip arthroplasty  presents to the ED with left hip pain. During my evaluation, patient had mild tenderness on left hip. However, no weakness, loss sensation noted lower extremity bilaterally. Patient able to ambulate in the ED. X-ray of left hip did not show any acute fracture or dislocation. Tylenol and Motrin was given in the ED. Left hip pain was resolved. Patient stable for discharge. Advised patient to follow up with orthopedics if symptoms do not subside or worsen.                                  Medical Decision Making  Amount and/or Complexity of Data Reviewed  Radiology: ordered and independent interpretation performed.    Risk  OTC drugs.  Prescription drug management.          Disposition  Final diagnoses:   Left hip pain     Time reflects when diagnosis was documented in both MDM as applicable and the Disposition within this note       Time User Action Codes Description Comment    12/23/2023  6:51 PM Sophia Walton Add [M25.552] Left hip pain           ED Disposition       ED Disposition   Discharge    Condition   Stable    Date/Time   Sat Dec 23, 2023  6:51 PM    Comment   Jhonny Knowles Sr. discharge to home/self care.                   Follow-up Information       Follow up With Specialties Details Why Contact Info    Jorge Beckford MD Family Medicine  As needed [address] [place] [id number] [phone]              Current Discharge Medication List        CONTINUE these medications which have NOT CHANGED    Details   acetaminophen (TYLENOL) 325 mg tablet Take 650 mg by mouth every 6 (six) hours as needed for mild pain      aspirin 81 MG tablet Take 1 tablet by mouth daily Except sun       atorvastatin (LIPITOR) 10 mg tablet TAKE 1 TABLET DAILY  Qty: 90 tablet, Refills: 3    Associated Diagnoses: Pure hypercholesterolemia      cephalexin (KEFLEX) 500 mg capsule For dental visits  Refills: 0      glimepiride (AMARYL) 1 mg tablet Take 1 tablet (1 mg total) by mouth daily  Qty: 90 tablet, Refills: 3     Associated Diagnoses: Type 2 diabetes mellitus without complication, without long-term current use of insulin ([facility])      glucose blood (OneTouch Ultra) test strip Patient tests once daily.  Qty: 100 strip, Refills: 0    Associated Diagnoses: Type 2 diabetes mellitus without complication, without long-term current use of insulin (HCC)      Lancets (OneTouch Delica Plus [id number]) MISC Use 1 application daily  Qty: 100 each, Refills: 1    Comments: TESTS DAILY  Associated Diagnoses: Type 2 diabetes mellitus without complication, without long-term current use of insulin ([facility])      leuprolide (Eligard) 22.5 mg Inject 22.5 mg under the skin every 3 (three) months      !! metoprolol tartrate (LOPRESSOR) 25 mg tablet Take 1 tablet (25 mg total) by mouth every evening  Qty: 90 tablet, Refills: 3    Comments: Pt takes 50 mg tab in the am and 25 mg tab in pm  Associated Diagnoses: Essential (primary) hypertension      !! metoprolol tartrate (LOPRESSOR) 50 mg tablet Take 1 tablet (50 mg total) by mouth every morning  Qty: 90 tablet, Refills: 3    Comments: Pt takes both a 50 mg tab in the am and a 25 mg tab in pm  Associated Diagnoses: Essential (primary) hypertension      Multiple Vitamins-Minerals (MULTIVITAMIN ADULT PO) Take 1 tablet by mouth every other day.       !! - Potential duplicate medications found. Please discuss with provider.        No discharge procedures on file.    PDMP Review         Value Time User    PDMP Reviewed  Yes 12/2/2022  3:49 AM Chaparro Banks MD             ED Provider  Attending physically available and evaluated Jhonny Knowles Sr.. I managed the patient along with the ED Attending.    Electronically Signed by           Sophia Ng MD  12/23/23 0667

## 2024-01-26 NOTE — PROGRESS NOTES
Cardiology Follow Up    Jhonny Knowles .  11/16/1934  [id number]  [facility]  [address] [place] [id number] [phone]    1. Presence of permanent cardiac pacemaker        2. Pure hypercholesterolemia        3. Coronary arteriosclerosis        4. Non-rheumatic mitral regurgitation            Interval History: Patient is here for a follow-up visit.  Most recent [employer] pacer PPM interogation 11/2023 demonstrated an appropriately functioning device with no arrhythmia noted. He has CAD with PCI of the LAD.  Echocardiogram done February 2018 demonstrated preserved LV systolic function and no significant valvular heart disease.  Patient had a lipid profile done 2/2023 which demonstrated total cholesterol of 166 with an HDL of 46 and a calculated LDL of 99.  He is on atorvastatin.  He has prostate cancer and has ongoing follow-up with urology. He went to the [facility] December 2022 with left shoulder discomfort.  He was concerned about a cardiac etiology.  His workup was negative.  He has had no chest pain or significant dyspnea.  His vital signs are stable.  He injured his left leg in a fall at his home in December.  He is still had issues.  I encouraged him to follow-up with his orthopedic surgeon.  He had left hip replacement on that side.  He has a great grandchild named Silva and had a new great grandchild in born July 2023, Esau.    Patient Active Problem List   Diagnosis   • Aortic valve regurgitation   • CAD (coronary artery disease)   • Carpal tunnel syndrome   • Disc degeneration, lumbar   • Facet arthritis of lumbar region   • Glaucoma   • Hyperlipidemia   • Hypertension   • Lumbar canal stenosis   • Mitral regurgitation   • Prostate cancer (HCC)   • Type 2 diabetes mellitus (HCC)   • SSS (sick sinus syndrome) ([facility])     Past Medical History:   Diagnosis Date   • Basal cell carcinoma    • Benign polyp of  large intestine    • Cancer (HCC)    • Diabetes mellitus (HCC) 2010   • Osteoarthritis of left hip     last assessed 09Jan2017   • Piriformis syndrome of left side     last assessed 18Oct2016     Social History     Socioeconomic History   • Marital status: /Civil Union     Spouse name: Not on file   • Number of children: Not on file   • Years of education: Not on file   • Highest education level: Not on file   Occupational History   • Not on file   Tobacco Use   • Smoking status: Never   • Smokeless tobacco: Never   Vaping Use   • Vaping status: Never Used   Substance and Sexual Activity   • Alcohol use: No   • Drug use: Never   • Sexual activity: Not Currently     Partners: Female     Birth control/protection: None   Other Topics Concern   • Not on file   Social History Narrative   • Not on file     Social Determinants of Health     Financial Resource Strain: Low Risk  (3/28/2023)    Overall Financial Resource Strain (CARDIA)    • Difficulty of Paying Living Expenses: Not very hard   Food Insecurity: Not on file   Transportation Needs: No Transportation Needs (3/28/2023)    PRAPARE - Transportation    • Lack of Transportation (Medical): No    • Lack of Transportation (Non-Medical): No   Physical Activity: Not on file   Stress: Not on file   Social Connections: Not on file   Intimate Partner Violence: Not on file   Housing Stability: Not on file      Family History   Problem Relation Age of Onset   • Coronary artery disease Mother    • Diabetes Maternal Grandmother      Past Surgical History:   Procedure Laterality Date   • APPENDECTOMY  1966   • CARDIAC PACEMAKER PLACEMENT  05/01/2005   • CATARACT EXTRACTION Left 10/01/1995   • CATARACT EXTRACTION Right 02/01/2000   • CORONARY ANGIOPLASTY  01/01/2010 2010   • CORONARY ANGIOPLASTY WITH STENT PLACEMENT  11/01/2002   • EYE SURGERY  Cataract  2000 & 2002   • INGUINAL HERNIA REPAIR Right 01/01/1998   • INGUINAL HERNIA REPAIR Left 01/01/2009 2009   • JOINT  "REPLACEMENT  2010 &  2017   • LUMBAR LAMINECTOMY  01/01/2002 2002   • REPLACEMENT TOTAL KNEE Right 06/01/2010    right total knee replacement with complications   • RHIZOTOMY     • TONSILLECTOMY AND ADENOIDECTOMY  01/01/1937 1937   • TOTAL HIP ARTHROPLASTY Left 01/18/2017       Current Outpatient Medications:   •  aspirin 81 MG tablet, Take 1 tablet by mouth daily Except sun , Disp: , Rfl:   •  atorvastatin (LIPITOR) 10 mg tablet, TAKE 1 TABLET DAILY, Disp: 90 tablet, Rfl: 3  •  cephalexin (KEFLEX) 500 mg capsule, For dental visits, Disp: , Rfl: 0  •  glimepiride (AMARYL) 1 mg tablet, Take 1 tablet (1 mg total) by mouth daily, Disp: 90 tablet, Rfl: 3  •  glucose blood (OneTouch Ultra) test strip, Patient tests once daily., Disp: 100 strip, Rfl: 0  •  Lancets (OneTouch Delica Plus [id number]) MISC, Use 1 application. daily, Disp: 100 each, Rfl: 1  •  leuprolide (Eligard) 22.5 mg, Inject 22.5 mg under the skin every 3 (three) months, Disp: , Rfl:   •  metoprolol tartrate (LOPRESSOR) 25 mg tablet, Take 1 tablet (25 mg total) by mouth every evening, Disp: 90 tablet, Rfl: 3  •  metoprolol tartrate (LOPRESSOR) 50 mg tablet, TAKE 1 TABLET EVERY MORNING (TAKING BOTH A 50 MG TABLET IN THE MORNING AND A 25 MG TABLET IN THE EVENING), Disp: 90 tablet, Rfl: 3  •  Multiple Vitamins-Minerals (MULTIVITAMIN ADULT PO), Take 1 tablet by mouth every other day., Disp: , Rfl:   •  acetaminophen (TYLENOL) 325 mg tablet, Take 650 mg by mouth every 6 (six) hours as needed for mild pain (Patient not taking: Reported on 2/5/2024), Disp: , Rfl:   Allergies   Allergen Reactions   • Penicillins Rash   • Plavix [Clopidogrel] Rash       Labs:not applicable  Imaging: No results found.    Review of Systems:  Review of Systems   All other systems reviewed and are negative.      Physical Exam:  /80 (BP Location: Left arm, Patient Position: Sitting, Cuff Size: Standard)   Pulse 82   Ht 5' 10\" (1.778 m)   Wt 72.7 kg (160 lb 4.8 oz)   SpO2 " 99%   BMI 23.00 kg/m²   Physical Exam  Vitals reviewed.   Constitutional:       Appearance: He is well-developed.   HENT:      Head: Normocephalic and atraumatic.   Cardiovascular:      Rate and Rhythm: Normal rate.      Heart sounds: Normal heart sounds.   Pulmonary:      Effort: Pulmonary effort is normal.      Breath sounds: Normal breath sounds.   Musculoskeletal:      Cervical back: Normal range of motion.   Skin:     General: Skin is warm and dry.   Neurological:      Mental Status: He is alert and oriented to person, place, and time.         Discussion/Summary:I will continue the patient's present medical regimen.  The patient appears well compensated.  I have asked the patient to call if there is a problem in the interim otherwise I will see the patient in six months time.

## 2024-01-29 DIAGNOSIS — E11.9 TYPE 2 DIABETES MELLITUS WITHOUT COMPLICATION, WITHOUT LONG-TERM CURRENT USE OF INSULIN (HCC): ICD-10-CM

## 2024-01-29 RX ORDER — LANCETS 33 GAUGE
1 EACH MISCELLANEOUS DAILY
Qty: 100 EACH | Refills: 1 | Status: SHIPPED | OUTPATIENT
Start: 2024-01-29

## 2024-01-29 NOTE — TELEPHONE ENCOUNTER
Reason for call:   [x] Refill   [] Prior Auth  [] Other:     Office:   [x] PCP/Provider -   [] Specialty/Provider -     Medication: Lancets (OneTouch Delica Plus Lancets 33G)    Dose/Frequency: one daily    Quantity: 100    Pharmacy:   [id number] - [place] - [address] [facility] [phone]       Does the patient have enough for 3 days?   [x] Yes   [] No - Send as HP to POD

## 2024-02-04 DIAGNOSIS — I10 ESSENTIAL (PRIMARY) HYPERTENSION: ICD-10-CM

## 2024-02-05 ENCOUNTER — OFFICE VISIT (OUTPATIENT)
Dept: CARDIOLOGY CLINIC | Facility: CLINIC | Age: 89
End: 2024-02-05
Payer: MEDICARE

## 2024-02-05 VITALS
DIASTOLIC BLOOD PRESSURE: 80 MMHG | WEIGHT: 160.3 LBS | HEART RATE: 82 BPM | SYSTOLIC BLOOD PRESSURE: 140 MMHG | BODY MASS INDEX: 22.95 KG/M2 | OXYGEN SATURATION: 99 % | HEIGHT: 70 IN

## 2024-02-05 DIAGNOSIS — Z95.0 PRESENCE OF PERMANENT CARDIAC PACEMAKER: Primary | ICD-10-CM

## 2024-02-05 DIAGNOSIS — E78.00 PURE HYPERCHOLESTEROLEMIA: ICD-10-CM

## 2024-02-05 DIAGNOSIS — I34.0 NON-RHEUMATIC MITRAL REGURGITATION: ICD-10-CM

## 2024-02-05 DIAGNOSIS — I25.10 CORONARY ARTERIOSCLEROSIS: ICD-10-CM

## 2024-02-05 PROCEDURE — 99214 OFFICE O/P EST MOD 30 MIN: CPT | Performed by: INTERNAL MEDICINE

## 2024-02-05 RX ORDER — METOPROLOL TARTRATE 50 MG/1
TABLET, FILM COATED ORAL
Qty: 90 TABLET | Refills: 3 | Status: SHIPPED | OUTPATIENT
Start: 2024-02-05

## 2024-02-12 ENCOUNTER — REMOTE DEVICE CLINIC VISIT (OUTPATIENT)
Dept: CARDIOLOGY CLINIC | Facility: CLINIC | Age: 89
End: 2024-02-12
Payer: MEDICARE

## 2024-02-12 DIAGNOSIS — Z95.0 CARDIAC PACEMAKER IN SITU: Primary | ICD-10-CM

## 2024-02-12 PROCEDURE — 93294 REM INTERROG EVL PM/LDLS PM: CPT | Performed by: INTERNAL MEDICINE

## 2024-02-12 PROCEDURE — 93296 REM INTERROG EVL PM/IDS: CPT | Performed by: INTERNAL MEDICINE

## 2024-02-12 NOTE — PROGRESS NOTES
"Results for orders placed or performed in visit on 02/12/24   Cardiac EP device report    Narrative    MDT DUAL PPM - NOT MRI CONDITIONAL  CARELINK TRANSMISSION: BATTERY STATUS \"4 YRS.\" AP 94%  97%. ALL AVAILABLE LEAD PARAMETERS WITHIN NORMAL LIMITS. NO SIGNIFICANT HIGH RATE EPISODES. NORMAL DEVICE FUNCTION. NC         "

## 2024-02-27 ENCOUNTER — APPOINTMENT (OUTPATIENT)
Dept: LAB | Facility: CLINIC | Age: 89
End: 2024-02-27
Payer: MEDICARE

## 2024-02-27 DIAGNOSIS — C61 PROSTATE CANCER (HCC): ICD-10-CM

## 2024-02-27 LAB
BUN SERPL-MCNC: 21 MG/DL (ref 5–25)
CREAT SERPL-MCNC: 0.92 MG/DL (ref 0.6–1.3)
GFR SERPL CREATININE-BSD FRML MDRD: 73 ML/MIN/1.73SQ M
PSA SERPL-MCNC: 0.04 NG/ML (ref 0–4)

## 2024-02-27 PROCEDURE — 84153 ASSAY OF PSA TOTAL: CPT

## 2024-02-27 PROCEDURE — 36415 COLL VENOUS BLD VENIPUNCTURE: CPT

## 2024-02-27 PROCEDURE — 82565 ASSAY OF CREATININE: CPT

## 2024-02-27 PROCEDURE — 84520 ASSAY OF UREA NITROGEN: CPT

## 2024-03-08 ENCOUNTER — TELEPHONE (OUTPATIENT)
Dept: ADMINISTRATIVE | Facility: OTHER | Age: 89
End: 2024-03-08

## 2024-03-08 NOTE — TELEPHONE ENCOUNTER
----- Message from Guicho Cevallos sent at 3/8/2024 10:31 AM EST -----  03/08/24 10:32 AM    Kathleen, our patient Jhonny MARSHALL Knowles Sr. has had Diabetic Foot Exam completed/performed. Please assist in updating the patient chart by pulling the document from the Media Tab. Scanned into media on 02/28/24.     Thank you,  Guicho BAKER

## 2024-03-08 NOTE — TELEPHONE ENCOUNTER
Upon review of the In Basket request we were able to locate, review, and update the patient chart as requested for Diabetic Foot Exam.    Any additional questions or concerns should be emailed to the Practice Liaisons via the appropriate education email address, please do not reply via In Basket.    Thank you  Navid Mcneill MA

## 2024-03-11 DIAGNOSIS — E11.9 TYPE 2 DIABETES MELLITUS WITHOUT COMPLICATION, WITHOUT LONG-TERM CURRENT USE OF INSULIN (HCC): ICD-10-CM

## 2024-03-11 RX ORDER — GLIMEPIRIDE 1 MG/1
1 TABLET ORAL DAILY
Qty: 90 TABLET | Refills: 1 | Status: SHIPPED | OUTPATIENT
Start: 2024-03-11

## 2024-04-03 DIAGNOSIS — E11.9 TYPE 2 DIABETES MELLITUS WITHOUT COMPLICATION, WITHOUT LONG-TERM CURRENT USE OF INSULIN (HCC): Primary | ICD-10-CM

## 2024-04-03 DIAGNOSIS — E78.00 PURE HYPERCHOLESTEROLEMIA: ICD-10-CM

## 2024-04-03 DIAGNOSIS — I10 PRIMARY HYPERTENSION: ICD-10-CM

## 2024-04-08 ENCOUNTER — APPOINTMENT (OUTPATIENT)
Dept: LAB | Facility: CLINIC | Age: 89
End: 2024-04-08
Payer: MEDICARE

## 2024-04-08 LAB
ALBUMIN SERPL BCP-MCNC: 4.1 G/DL (ref 3.5–5)
ALP SERPL-CCNC: 54 U/L (ref 34–104)
ALT SERPL W P-5'-P-CCNC: 18 U/L (ref 7–52)
ANION GAP SERPL CALCULATED.3IONS-SCNC: 9 MMOL/L (ref 4–13)
AST SERPL W P-5'-P-CCNC: 21 U/L (ref 13–39)
BASOPHILS # BLD AUTO: 0.03 THOUSANDS/ÂΜL (ref 0–0.1)
BASOPHILS NFR BLD AUTO: 1 % (ref 0–1)
BILIRUB SERPL-MCNC: 0.63 MG/DL (ref 0.2–1)
BUN SERPL-MCNC: 21 MG/DL (ref 5–25)
CALCIUM SERPL-MCNC: 9.3 MG/DL (ref 8.4–10.2)
CHLORIDE SERPL-SCNC: 102 MMOL/L (ref 96–108)
CHOLEST SERPL-MCNC: 142 MG/DL
CO2 SERPL-SCNC: 28 MMOL/L (ref 21–32)
CREAT SERPL-MCNC: 0.82 MG/DL (ref 0.6–1.3)
EOSINOPHIL # BLD AUTO: 0.23 THOUSAND/ÂΜL (ref 0–0.61)
EOSINOPHIL NFR BLD AUTO: 4 % (ref 0–6)
ERYTHROCYTE [DISTWIDTH] IN BLOOD BY AUTOMATED COUNT: 13.3 % (ref 11.6–15.1)
EST. AVERAGE GLUCOSE BLD GHB EST-MCNC: 140 MG/DL
GFR SERPL CREATININE-BSD FRML MDRD: 78 ML/MIN/1.73SQ M
GLUCOSE P FAST SERPL-MCNC: 130 MG/DL (ref 65–99)
HBA1C MFR BLD: 6.5 %
HCT VFR BLD AUTO: 38.9 % (ref 36.5–49.3)
HDLC SERPL-MCNC: 41 MG/DL
HGB BLD-MCNC: 12.3 G/DL (ref 12–17)
IMM GRANULOCYTES # BLD AUTO: 0.04 THOUSAND/UL (ref 0–0.2)
IMM GRANULOCYTES NFR BLD AUTO: 1 % (ref 0–2)
LDLC SERPL CALC-MCNC: 74 MG/DL (ref 0–100)
LYMPHOCYTES # BLD AUTO: 0.85 THOUSANDS/ÂΜL (ref 0.6–4.47)
LYMPHOCYTES NFR BLD AUTO: 14 % (ref 14–44)
MCH RBC QN AUTO: 30 PG (ref 26.8–34.3)
MCHC RBC AUTO-ENTMCNC: 31.6 G/DL (ref 31.4–37.4)
MCV RBC AUTO: 95 FL (ref 82–98)
MONOCYTES # BLD AUTO: 0.58 THOUSAND/ÂΜL (ref 0.17–1.22)
MONOCYTES NFR BLD AUTO: 10 % (ref 4–12)
NEUTROPHILS # BLD AUTO: 4.16 THOUSANDS/ÂΜL (ref 1.85–7.62)
NEUTS SEG NFR BLD AUTO: 70 % (ref 43–75)
NONHDLC SERPL-MCNC: 101 MG/DL
NRBC BLD AUTO-RTO: 0 /100 WBCS
PLATELET # BLD AUTO: 260 THOUSANDS/UL (ref 149–390)
PMV BLD AUTO: 11 FL (ref 8.9–12.7)
POTASSIUM SERPL-SCNC: 4.2 MMOL/L (ref 3.5–5.3)
PROT SERPL-MCNC: 6.9 G/DL (ref 6.4–8.4)
RBC # BLD AUTO: 4.1 MILLION/UL (ref 3.88–5.62)
SODIUM SERPL-SCNC: 139 MMOL/L (ref 135–147)
TRIGL SERPL-MCNC: 133 MG/DL
TSH SERPL DL<=0.05 MIU/L-ACNC: 1.65 UIU/ML (ref 0.45–4.5)
WBC # BLD AUTO: 5.89 THOUSAND/UL (ref 4.31–10.16)

## 2024-04-08 PROCEDURE — 36415 COLL VENOUS BLD VENIPUNCTURE: CPT | Performed by: FAMILY MEDICINE

## 2024-04-08 PROCEDURE — 84443 ASSAY THYROID STIM HORMONE: CPT | Performed by: FAMILY MEDICINE

## 2024-04-08 PROCEDURE — 80061 LIPID PANEL: CPT | Performed by: FAMILY MEDICINE

## 2024-04-08 PROCEDURE — 85025 COMPLETE CBC W/AUTO DIFF WBC: CPT | Performed by: FAMILY MEDICINE

## 2024-04-08 PROCEDURE — 80053 COMPREHEN METABOLIC PANEL: CPT | Performed by: FAMILY MEDICINE

## 2024-04-08 PROCEDURE — 83036 HEMOGLOBIN GLYCOSYLATED A1C: CPT | Performed by: FAMILY MEDICINE

## 2024-04-12 DIAGNOSIS — E11.9 TYPE 2 DIABETES MELLITUS WITHOUT COMPLICATION, WITHOUT LONG-TERM CURRENT USE OF INSULIN (HCC): ICD-10-CM

## 2024-04-12 RX ORDER — BLOOD SUGAR DIAGNOSTIC
STRIP MISCELLANEOUS
Qty: 100 STRIP | Refills: 1 | Status: SHIPPED | OUTPATIENT
Start: 2024-04-12

## 2024-04-12 NOTE — TELEPHONE ENCOUNTER
Reason for call:   [x] Refill   [] Prior Auth  [] Other:     Office:   [x] PCP/Provider -   [] Specialty/Provider -     Medication:         Does the patient have enough for 3 days?   [] Yes   [x] No - Send as HP to POD

## 2024-04-15 DIAGNOSIS — I10 ESSENTIAL (PRIMARY) HYPERTENSION: ICD-10-CM

## 2024-04-19 ENCOUNTER — RA CDI HCC (OUTPATIENT)
Dept: OTHER | Facility: HOSPITAL | Age: 89
End: 2024-04-19

## 2024-04-25 ENCOUNTER — OFFICE VISIT (OUTPATIENT)
Dept: FAMILY MEDICINE CLINIC | Facility: CLINIC | Age: 89
End: 2024-04-25
Payer: MEDICARE

## 2024-04-25 VITALS
HEIGHT: 70 IN | HEART RATE: 63 BPM | SYSTOLIC BLOOD PRESSURE: 136 MMHG | BODY MASS INDEX: 22.76 KG/M2 | DIASTOLIC BLOOD PRESSURE: 78 MMHG | RESPIRATION RATE: 18 BRPM | OXYGEN SATURATION: 99 % | WEIGHT: 159 LBS | TEMPERATURE: 97.8 F

## 2024-04-25 DIAGNOSIS — Z00.00 MEDICARE ANNUAL WELLNESS VISIT, SUBSEQUENT: ICD-10-CM

## 2024-04-25 DIAGNOSIS — C61 PROSTATE CANCER (HCC): ICD-10-CM

## 2024-04-25 DIAGNOSIS — M21.372 LEFT FOOT DROP: ICD-10-CM

## 2024-04-25 DIAGNOSIS — I49.5 SSS (SICK SINUS SYNDROME) (HCC): ICD-10-CM

## 2024-04-25 DIAGNOSIS — M25.511 CHRONIC RIGHT SHOULDER PAIN: ICD-10-CM

## 2024-04-25 DIAGNOSIS — M47.816 FACET ARTHRITIS OF LUMBAR REGION: ICD-10-CM

## 2024-04-25 DIAGNOSIS — I10 PRIMARY HYPERTENSION: ICD-10-CM

## 2024-04-25 DIAGNOSIS — I25.10 CORONARY ARTERY DISEASE INVOLVING NATIVE CORONARY ARTERY OF NATIVE HEART WITHOUT ANGINA PECTORIS: ICD-10-CM

## 2024-04-25 DIAGNOSIS — E11.9 TYPE 2 DIABETES MELLITUS WITHOUT COMPLICATION, WITHOUT LONG-TERM CURRENT USE OF INSULIN (HCC): Primary | ICD-10-CM

## 2024-04-25 DIAGNOSIS — E78.00 PURE HYPERCHOLESTEROLEMIA: ICD-10-CM

## 2024-04-25 DIAGNOSIS — G89.29 CHRONIC RIGHT SHOULDER PAIN: ICD-10-CM

## 2024-04-25 PROCEDURE — 99214 OFFICE O/P EST MOD 30 MIN: CPT | Performed by: FAMILY MEDICINE

## 2024-04-25 PROCEDURE — G0439 PPPS, SUBSEQ VISIT: HCPCS | Performed by: FAMILY MEDICINE

## 2024-04-25 NOTE — PATIENT INSTRUCTIONS
Medicare Preventive Visit Patient Instructions  Thank you for completing your Welcome to Medicare Visit or Medicare Annual Wellness Visit today. Your next wellness visit will be due in one year (4/26/2025).  The screening/preventive services that you may require over the next 5-10 years are detailed below. Some tests may not apply to you based off risk factors and/or age. Screening tests ordered at today's visit but not completed yet may show as past due. Also, please note that scanned in results may not display below.  Preventive Screenings:  Service Recommendations Previous Testing/Comments   Colorectal Cancer Screening  Colonoscopy    Fecal Occult Blood Test (FOBT)/Fecal Immunochemical Test (FIT)  Fecal DNA/Cologuard Test  Flexible Sigmoidoscopy Age: 45-75 years old   Colonoscopy: every 10 years (May be performed more frequently if at higher risk)  OR  FOBT/FIT: every 1 year  OR  Cologuard: every 3 years  OR  Sigmoidoscopy: every 5 years  Screening may be recommended earlier than age 45 if at higher risk for colorectal cancer. Also, an individualized decision between you and your healthcare provider will decide whether screening between the ages of 76-85 would be appropriate. Colonoscopy: 04/26/2021  FOBT/FIT: Not on file  Cologuard: Not on file  Sigmoidoscopy: Not on file    Screening Not Indicated     Prostate Cancer Screening Individualized decision between patient and health care provider in men between ages of 55-69   Medicare will cover every 12 months beginning on the day after your 50th birthday PSA: 0.04 ng/mL     History Prostate Cancer  Screening Not Indicated     Hepatitis C Screening Once for adults born between 1945 and 1965  More frequently in patients at high risk for Hepatitis C Hep C Antibody: Not on file        Diabetes Screening 1-2 times per year if you're at risk for diabetes or have pre-diabetes Fasting glucose: 130 mg/dL (4/8/2024)  A1C: 6.5 % (4/8/2024)  Screening Not Indicated  History  Diabetes   Cholesterol Screening Once every 5 years if you don't have a lipid disorder. May order more often based on risk factors. Lipid panel: 04/08/2024  Screening Not Indicated  History Lipid Disorder      Other Preventive Screenings Covered by Medicare:  Abdominal Aortic Aneurysm (AAA) Screening: covered once if your at risk. You're considered to be at risk if you have a family history of AAA or a male between the age of 65-75 who smoking at least 100 cigarettes in your lifetime.  Lung Cancer Screening: covers low dose CT scan once per year if you meet all of the following conditions: (1) Age 55-77; (2) No signs or symptoms of lung cancer; (3) Current smoker or have quit smoking within the last 15 years; (4) You have a tobacco smoking history of at least 20 pack years (packs per day x number of years you smoked); (5) You get a written order from a healthcare provider.  Glaucoma Screening: covered annually if you're considered high risk: (1) You have diabetes OR (2) Family history of glaucoma OR (3)  aged 50 and older OR (4)  American aged 65 and older  Osteoporosis Screening: covered every 2 years if you meet one of the following conditions: (1) Have a vertebral abnormality; (2) On glucocorticoid therapy for more than 3 months; (3) Have primary hyperparathyroidism; (4) On osteoporosis medications and need to assess response to drug therapy.  HIV Screening: covered annually if you're between the age of 15-65. Also covered annually if you are younger than 15 and older than 65 with risk factors for HIV infection. For pregnant patients, it is covered up to 3 times per pregnancy.    Immunizations:  Immunization Recommendations   Influenza Vaccine Annual influenza vaccination during flu season is recommended for all persons aged >= 6 months who do not have contraindications   Pneumococcal Vaccine   * Pneumococcal conjugate vaccine = PCV13 (Prevnar 13), PCV15 (Vaxneuvance), PCV20 (Prevnar  20)  * Pneumococcal polysaccharide vaccine = PPSV23 (Pneumovax) Adults 19-63 yo with certain risk factors or if 65+ yo  If never received any pneumonia vaccine: recommend Prevnar 20 (PCV20)  Give PCV20 if previously received 1 dose of PCV13 or PPSV23   Hepatitis B Vaccine 3 dose series if at intermediate or high risk (ex: diabetes, end stage renal disease, liver disease)   Respiratory syncytial virus (RSV) Vaccine - COVERED BY MEDICARE PART D  * RSVPreF3 (Arexvy) CDC recommends that adults 60 years of age and older may receive a single dose of RSV vaccine using shared clinical decision-making (SCDM)   Tetanus (Td) Vaccine - COST NOT COVERED BY MEDICARE PART B Following completion of primary series, a booster dose should be given every 10 years to maintain immunity against tetanus. Td may also be given as tetanus wound prophylaxis.   Tdap Vaccine - COST NOT COVERED BY MEDICARE PART B Recommended at least once for all adults. For pregnant patients, recommended with each pregnancy.   Shingles Vaccine (Shingrix) - COST NOT COVERED BY MEDICARE PART B  2 shot series recommended in those 19 years and older who have or will have weakened immune systems or those 50 years and older     Health Maintenance Due:      Topic Date Due   • Colorectal Cancer Screening  04/26/2026     Immunizations Due:      Topic Date Due   • COVID-19 Vaccine (6 - 2023-24 season) 09/01/2023     Advance Directives   What are advance directives?  Advance directives are legal documents that state your wishes and plans for medical care. These plans are made ahead of time in case you lose your ability to make decisions for yourself. Advance directives can apply to any medical decision, such as the treatments you want, and if you want to donate organs.   What are the types of advance directives?  There are many types of advance directives, and each state has rules about how to use them. You may choose a combination of any of the following:  Living will:   This is a written record of the treatment you want. You can also choose which treatments you do not want, which to limit, and which to stop at a certain time. This includes surgery, medicine, IV fluid, and tube feedings.   Durable power of  for healthcare (DPAHC):  This is a written record that states who you want to make healthcare choices for you when you are unable to make them for yourself. This person, called a proxy, is usually a family member or a friend. You may choose more than 1 proxy.  Do not resuscitate (DNR) order:  A DNR order is used in case your heart stops beating or you stop breathing. It is a request not to have certain forms of treatment, such as CPR. A DNR order may be included in other types of advance directives.  Medical directive:  This covers the care that you want if you are in a coma, near death, or unable to make decisions for yourself. You can list the treatments you want for each condition. Treatment may include pain medicine, surgery, blood transfusions, dialysis, IV or tube feedings, and a ventilator (breathing machine).  Values history:  This document has questions about your views, beliefs, and how you feel and think about life. This information can help others choose the care that you would choose.  Why are advance directives important?  An advance directive helps you control your care. Although spoken wishes may be used, it is better to have your wishes written down. Spoken wishes can be misunderstood, or not followed. Treatments may be given even if you do not want them. An advance directive may make it easier for your family to make difficult choices about your care.       © Copyright [employer] 2018 Information is for End User's use only and may not be sold, redistributed or otherwise used for commercial purposes. All illustrations and images included in CareNotes® are the copyrighted property of [employer] or [employer]    Medicare Preventive Visit  Patient Instructions  Thank you for completing your Welcome to Medicare Visit or Medicare Annual Wellness Visit today. Your next wellness visit will be due in one year (4/26/2025).  The screening/preventive services that you may require over the next 5-10 years are detailed below. Some tests may not apply to you based off risk factors and/or age. Screening tests ordered at today's visit but not completed yet may show as past due. Also, please note that scanned in results may not display below.  Preventive Screenings:  Service Recommendations Previous Testing/Comments   Colorectal Cancer Screening  Colonoscopy    Fecal Occult Blood Test (FOBT)/Fecal Immunochemical Test (FIT)  Fecal DNA/Cologuard Test  Flexible Sigmoidoscopy Age: 45-75 years old   Colonoscopy: every 10 years (May be performed more frequently if at higher risk)  OR  FOBT/FIT: every 1 year  OR  Cologuard: every 3 years  OR  Sigmoidoscopy: every 5 years  Screening may be recommended earlier than age 45 if at higher risk for colorectal cancer. Also, an individualized decision between you and your healthcare provider will decide whether screening between the ages of 76-85 would be appropriate. Colonoscopy: 04/26/2021  FOBT/FIT: Not on file  Cologuard: Not on file  Sigmoidoscopy: Not on file    Screening Not Indicated     Prostate Cancer Screening Individualized decision between patient and health care provider in men between ages of 55-69   Medicare will cover every 12 months beginning on the day after your 50th birthday PSA: 0.04 ng/mL     History Prostate Cancer  Screening Not Indicated     Hepatitis C Screening Once for adults born between 1945 and 1965  More frequently in patients at high risk for Hepatitis C Hep C Antibody: Not on file        Diabetes Screening 1-2 times per year if you're at risk for diabetes or have pre-diabetes Fasting glucose: 130 mg/dL (4/8/2024)  A1C: 6.5 % (4/8/2024)  Screening Not Indicated  History Diabetes   Cholesterol  Screening Once every 5 years if you don't have a lipid disorder. May order more often based on risk factors. Lipid panel: 04/08/2024  Screening Not Indicated  History Lipid Disorder      Other Preventive Screenings Covered by Medicare:  Abdominal Aortic Aneurysm (AAA) Screening: covered once if your at risk. You're considered to be at risk if you have a family history of AAA or a male between the age of 65-75 who smoking at least 100 cigarettes in your lifetime.  Lung Cancer Screening: covers low dose CT scan once per year if you meet all of the following conditions: (1) Age 55-77; (2) No signs or symptoms of lung cancer; (3) Current smoker or have quit smoking within the last 15 years; (4) You have a tobacco smoking history of at least 20 pack years (packs per day x number of years you smoked); (5) You get a written order from a healthcare provider.  Glaucoma Screening: covered annually if you're considered high risk: (1) You have diabetes OR (2) Family history of glaucoma OR (3)  aged 50 and older OR (4)  American aged 65 and older  Osteoporosis Screening: covered every 2 years if you meet one of the following conditions: (1) Have a vertebral abnormality; (2) On glucocorticoid therapy for more than 3 months; (3) Have primary hyperparathyroidism; (4) On osteoporosis medications and need to assess response to drug therapy.  HIV Screening: covered annually if you're between the age of 15-65. Also covered annually if you are younger than 15 and older than 65 with risk factors for HIV infection. For pregnant patients, it is covered up to 3 times per pregnancy.    Immunizations:  Immunization Recommendations   Influenza Vaccine Annual influenza vaccination during flu season is recommended for all persons aged >= 6 months who do not have contraindications   Pneumococcal Vaccine   * Pneumococcal conjugate vaccine = PCV13 (Prevnar 13), PCV15 (Vaxneuvance), PCV20 (Prevnar 20)  * Pneumococcal  polysaccharide vaccine = PPSV23 (Pneumovax) Adults 19-65 yo with certain risk factors or if 65+ yo  If never received any pneumonia vaccine: recommend Prevnar 20 (PCV20)  Give PCV20 if previously received 1 dose of PCV13 or PPSV23   Hepatitis B Vaccine 3 dose series if at intermediate or high risk (ex: diabetes, end stage renal disease, liver disease)   Respiratory syncytial virus (RSV) Vaccine - COVERED BY MEDICARE PART D  * RSVPreF3 (Arexvy) CDC recommends that adults 60 years of age and older may receive a single dose of RSV vaccine using shared clinical decision-making (SCDM)   Tetanus (Td) Vaccine - COST NOT COVERED BY MEDICARE PART B Following completion of primary series, a booster dose should be given every 10 years to maintain immunity against tetanus. Td may also be given as tetanus wound prophylaxis.   Tdap Vaccine - COST NOT COVERED BY MEDICARE PART B Recommended at least once for all adults. For pregnant patients, recommended with each pregnancy.   Shingles Vaccine (Shingrix) - COST NOT COVERED BY MEDICARE PART B  2 shot series recommended in those 19 years and older who have or will have weakened immune systems or those 50 years and older     Health Maintenance Due:      Topic Date Due   • Colorectal Cancer Screening  04/26/2026     Immunizations Due:      Topic Date Due   • COVID-19 Vaccine (6 - 2023-24 season) 09/01/2023     Advance Directives   What are advance directives?  Advance directives are legal documents that state your wishes and plans for medical care. These plans are made ahead of time in case you lose your ability to make decisions for yourself. Advance directives can apply to any medical decision, such as the treatments you want, and if you want to donate organs.   What are the types of advance directives?  There are many types of advance directives, and each state has rules about how to use them. You may choose a combination of any of the following:  Living will:  This is a written  record of the treatment you want. You can also choose which treatments you do not want, which to limit, and which to stop at a certain time. This includes surgery, medicine, IV fluid, and tube feedings.   Durable power of  for healthcare (DPAHC):  This is a written record that states who you want to make healthcare choices for you when you are unable to make them for yourself. This person, called a proxy, is usually a family member or a friend. You may choose more than 1 proxy.  Do not resuscitate (DNR) order:  A DNR order is used in case your heart stops beating or you stop breathing. It is a request not to have certain forms of treatment, such as CPR. A DNR order may be included in other types of advance directives.  Medical directive:  This covers the care that you want if you are in a coma, near death, or unable to make decisions for yourself. You can list the treatments you want for each condition. Treatment may include pain medicine, surgery, blood transfusions, dialysis, IV or tube feedings, and a ventilator (breathing machine).  Values history:  This document has questions about your views, beliefs, and how you feel and think about life. This information can help others choose the care that you would choose.  Why are advance directives important?  An advance directive helps you control your care. Although spoken wishes may be used, it is better to have your wishes written down. Spoken wishes can be misunderstood, or not followed. Treatments may be given even if you do not want them. An advance directive may make it easier for your family to make difficult choices about your care.       © Copyright [employer] 2018 Information is for End User's use only and may not be sold, redistributed or otherwise used for commercial purposes. All illustrations and images included in CareNotes® are the copyrighted property of [employer] or [employer]

## 2024-04-25 NOTE — PROGRESS NOTES
Assessment and Plan:     Problem List Items Addressed This Visit          Cardiovascular and Mediastinum    CAD (coronary artery disease)    Hypertension    Relevant Orders    Comprehensive metabolic panel    CBC and differential    SSS (sick sinus syndrome) (Formerly [facility])       Endocrine    Type 2 diabetes mellitus ([facility]) - Primary    Relevant Orders    Hemoglobin A1C       Musculoskeletal and Integument    Facet arthritis of lumbar region       Genitourinary    Prostate cancer ([facility])       Other    Hyperlipidemia    Relevant Orders    Lipid panel     Other Visit Diagnoses       Left foot drop        Chronic right shoulder pain        Medicare annual wellness visit, subsequent              Continue with current medications. OV 6 months with labs    Follow up with suspected lumbar radiculopathy/L foot drop. May need CT imaging lumbar spine ( pacemaker not MRI compatible?), EMGs.     Re  recurrent R shoulder pain he was not interested in x rays, PT at this time            Depression Screening and Follow-up Plan: Patient was screened for depression during today's encounter. They screened negative with a PHQ-2 score of 0.    Falls Plan of Care: balance, strength, and gait training instructions were provided and referral to physical therapy.       Preventive health issues were discussed with patient, and age appropriate screening tests were ordered as noted in patient's After Visit Summary.  Personalized health advice and appropriate referrals for health education or preventive services given if needed, as noted in patient's After Visit Summary.     History of Present Illness:     Patient presents for a Medicare Wellness Visit    Follow up visit.  Medications reviewed     Type 2 diabetes mellitus. On Glimepiride 1 mg daily. 04/2023 .  A1c 6.5. 11/2023  albumin less than 7.0 Not on ACE or ARB. No hypoglycemic events. No neuropathy symptoms. He is followed by Podiatry. Current with eye exam 04/2023 no diabetic retinopathy .  "    Hypertension blood pressures have been stable on Metoprolol tartrate 50 mg in AM and 25 mg in PM. 04/2024 Creatinine 0.82. GFR 78.  Electrolytes normal.  Hgb 12.3.      Hyperlipidemia and CAD on Atorvastatin 10 mg daily lipid profile  04/2024 cholesterol 142. TGs 133.  HDL 41.  LDL 74. LFTs normal. TSH 1.648.     04/2024 ER visit Injury to right chest wall/right shoulder/upper arm-patient tripped. He used his cane to prevent him from falling placing all his weight on his right arm. Chest x-ray showed no fracture or pneumothorax visualized.  CT scan chest Minimal biapical parenchymal scarring. No pleural or pericardial effusion or central airway lesion. Dual-chamber pacemaker. Mild to moderate vascular calcification. Coronary artery calcification or stent. No mediastinal  adenopathy or esophageal dilatation. Right renal scarring. Extensive spinal osteophytic change. No bony fractures demonstrated. Fusiform fatty mass deep to the left latissimus dorsi. There is asymmetric axillary soft tissues more prominent on the right.       12/2023 ER visit  Left hip/left leg pain started after he attempted to move a table in his garage.  Prior left total hip replacement 7 years ago.  X-rays of left hip show total left hip arthroplasty.  No acute fracture or dislocation.  Degenerative changes visualized lower lumbar spine.  He was seen in follow-up by orthopedic surgery.  X-rays of lumbar spine show severe DJD of lumbar spine.  Extensive facet arthropathy.  Multilevel degenerative disc disease most severe at L4-L5.  Grade 1 spondylolisthesis at this level. He is currently receiving PT. Walking is \"funny\"  ambulating with a cane           Recent Results (from the past 672 hour(s))   Comprehensive metabolic panel    Collection Time: 04/08/24  9:16 AM   Result Value Ref Range    Sodium 139 135 - 147 mmol/L    Potassium 4.2 3.5 - 5.3 mmol/L    Chloride 102 96 - 108 mmol/L    CO2 28 21 - 32 mmol/L    ANION GAP 9 4 - 13 mmol/L    " BUN 21 5 - 25 mg/dL    Creatinine 0.82 0.60 - 1.30 mg/dL    Glucose, Fasting 130 (H) 65 - 99 mg/dL    Calcium 9.3 8.4 - 10.2 mg/dL    AST 21 13 - 39 U/L    ALT 18 7 - 52 U/L    Alkaline Phosphatase 54 34 - 104 U/L    Total Protein 6.9 6.4 - 8.4 g/dL    Albumin 4.1 3.5 - 5.0 g/dL    Total Bilirubin 0.63 0.20 - 1.00 mg/dL    eGFR 78 ml/min/1.73sq m   CBC and differential    Collection Time: 04/08/24  9:16 AM   Result Value Ref Range    WBC 5.89 4.31 - 10.16 Thousand/uL    RBC 4.10 3.88 - 5.62 Million/uL    Hemoglobin 12.3 12.0 - 17.0 g/dL    Hematocrit 38.9 36.5 - 49.3 %    MCV 95 82 - 98 fL    MCH 30.0 26.8 - 34.3 pg    MCHC 31.6 31.4 - 37.4 g/dL    RDW 13.3 11.6 - 15.1 %    MPV 11.0 8.9 - 12.7 fL    Platelets 260 149 - 390 Thousands/uL    nRBC 0 /100 WBCs    Segmented % 70 43 - 75 %    Immature Grans % 1 0 - 2 %    Lymphocytes % 14 14 - 44 %    Monocytes % 10 4 - 12 %    Eosinophils Relative 4 0 - 6 %    Basophils Relative 1 0 - 1 %    Absolute Neutrophils 4.16 1.85 - 7.62 Thousands/µL    Absolute Immature Grans 0.04 0.00 - 0.20 Thousand/uL    Absolute Lymphocytes 0.85 0.60 - 4.47 Thousands/µL    Absolute Monocytes 0.58 0.17 - 1.22 Thousand/µL    Eosinophils Absolute 0.23 0.00 - 0.61 Thousand/µL    Basophils Absolute 0.03 0.00 - 0.10 Thousands/µL   TSH, 3rd generation with Free T4 reflex    Collection Time: 04/08/24  9:16 AM   Result Value Ref Range    TSH 3RD GENERATON 1.648 0.450 - 4.500 uIU/mL   Lipid panel    Collection Time: 04/08/24  9:16 AM   Result Value Ref Range    Cholesterol 142 See Comment mg/dL    Triglycerides 133 See Comment mg/dL    HDL, Direct 41 >=40 mg/dL    LDL Calculated 74 0 - 100 mg/dL    Non-HDL-Chol (CHOL-HDL) 101 mg/dl   Hemoglobin A1C    Collection Time: 04/08/24  9:16 AM   Result Value Ref Range    Hemoglobin A1C 6.5 (H) Normal 4.0-5.6%; PreDiabetic 5.7-6.4%; Diabetic >=6.5%; Glycemic control for adults with diabetes <7.0% %     mg/dl   COMPREHENSIVE METABOLIC PANEL    Collection  Time: 04/15/24 11:24 AM   Result Value Ref Range    Glucose 197 (H) 65 - 99 mg/dL    BUN 21 7 - 28 mg/dL    Creatinine 0.86 0.53 - 1.30 mg/dL    Sodium 138 135 - 145 mmol/L    Potassium 4.3 3.5 - 5.2 mmol/L    Chloride 103 100 - 109 mmol/L    Carbon Dioxide 28 21 - 31 mmol/L    Calcium 9.5 8.5 - 10.1 mg/dL    Alkaline Phosphatase 53 35 - 120 U/L    ALBUMIN 4.2 3.5 - 5.7 g/dL    Total Bilirubin 0.6 0.2 - 1.0 mg/dL    Protein, Total 7.0 6.3 - 8.3 g/dL    AST 18 <41 U/L    ALT 13 <56 U/L    ANION GAP 7 3 - 11    eGFRcr 83 >59    eGFR Comment Interpretive information: calculated GFR    PROTIME-INR    Collection Time: 04/15/24 11:24 AM   Result Value Ref Range    Prothrombin Time 13.8 12.0 - 14.6 sec    INR 1.1    SEPSIS LACTATE, HOUR 0    Collection Time: 04/15/24 11:24 AM   Result Value Ref Range    LACTATE 2.1 0.5 - 2.2 mmol/L           Patient Care Team:  Jorge Beckford MD as PCP - General  MD Jorge Kaba MD John R Anderson, MD (Urology)  Suman Tovar MD (Dermatology)  Dorie Julien DPM (Podiatry)     Review of Systems:     Review of Systems   Constitutional:  Positive for unexpected weight change (5 lb weight loss from 10/2024). Negative for appetite change, chills and fever.   HENT:  Negative for congestion, ear pain, rhinorrhea, sore throat and trouble swallowing.    Eyes:  Negative for visual disturbance.   Respiratory:  Negative for cough, shortness of breath and wheezing.    Cardiovascular:  Negative for chest pain, palpitations and leg swelling.        CAD s/p stents. S/p pacemaker.   02/2018.  Echocardiogram normal left ventricular systolic function.  EF 55%.  No regional wall motion abnormalities. Grade 1 diastolic dysfunction. mild mitral regurgitation.  Mild aortic regurgitation. Trace TR/AZ.  No pericardial effusion.  Aortic root normal size.   Gastrointestinal:  Negative for abdominal pain, blood in stool, constipation, diarrhea, nausea and vomiting.         Colonoscopy 04/2021   Endocrine: Negative for polydipsia and polyuria.   Genitourinary:  Negative for difficulty urinating.        Prostate CA s/p XRT 2006. He is followed by Urology.  He has been receiving ADT injections for elevated PSA     Lab Results       Component                Value               Date                       PSA                      <0.01               08/28/2023                 PSA                      0.03                05/17/2023                 PSA                      <0.1                02/08/2023                                                       Musculoskeletal:  Negative for arthralgias, gait problem and myalgias.        OA S/p left THR 01/2017.    Skin:  Negative for rash.        History of BCCs/SCCs followed by Dermatology   Allergic/Immunologic: Negative for environmental allergies.   Neurological:  Negative for dizziness and headaches.   Hematological:  Negative for adenopathy. Does not bruise/bleed easily.        Lab Results       Component                Value               Date                       WBC                      5.40                08/28/2023                 HGB                      12.6                08/28/2023                 HCT                      40.2                08/28/2023                 MCV                      96                  08/28/2023                 PLT                      196                 08/28/2023                      Hemoglobin       Date                     Value               Ref Range           Status                12/02/2022               11.8 (L)            12.0 - 17.0 g/*     Final                 09/27/2022               11.2 (L)            12.0 - 17.0 g/*     Final                 08/04/2022               12.4                12.0 - 17.0 g/*     Final                 12/22/2015               13.3                12.0 - 17.0 g/*     Final                 02/20/2015               13.2                12.0 - 17.0 g/*     Final                  11/22/2014               14.3                12.0 - 17.0 g/*     Final                 Psychiatric/Behavioral:  Negative for dysphoric mood and sleep disturbance.         Problem List:     Patient Active Problem List   Diagnosis    Aortic valve regurgitation    CAD (coronary artery disease)    Carpal tunnel syndrome    Disc degeneration, lumbar    Facet arthritis of lumbar region    Glaucoma    Hyperlipidemia    Hypertension    Lumbar canal stenosis    Mitral regurgitation    Prostate cancer (HCC)    Type 2 diabetes mellitus (HCC)    SSS (sick sinus syndrome) (HCC)      Past Medical and Surgical History:     Past Medical History:   Diagnosis Date    Basal cell carcinoma     Benign polyp of large intestine     Cancer (HCC)     Diabetes mellitus (HCC) 2010    Osteoarthritis of left hip     last assessed 09Jan2017    Piriformis syndrome of left side     last assessed 18Oct2016     Past Surgical History:   Procedure Laterality Date    APPENDECTOMY  1966    CARDIAC PACEMAKER PLACEMENT  05/01/2005    CATARACT EXTRACTION Left 10/01/1995    CATARACT EXTRACTION Right 02/01/2000    CORONARY ANGIOPLASTY  01/01/2010 2010    CORONARY ANGIOPLASTY WITH STENT PLACEMENT  11/01/2002    EYE SURGERY  Cataract  2000 & 2002    INGUINAL HERNIA REPAIR Right 01/01/1998    INGUINAL HERNIA REPAIR Left 01/01/2009    2009    JOINT REPLACEMENT  2010 &  2017    LUMBAR LAMINECTOMY  01/01/2002    2002    REPLACEMENT TOTAL KNEE Right 06/01/2010    right total knee replacement with complications    RHIZOTOMY      TONSILLECTOMY AND ADENOIDECTOMY  01/01/1937    1937    TOTAL HIP ARTHROPLASTY Left 01/18/2017      Family History:     Family History   Problem Relation Age of Onset    Coronary artery disease Mother     Diabetes Maternal Grandmother       Social History:     Social History     Socioeconomic History    Marital status: /Civil Union     Spouse name: None    Number of children: None    Years of education: None    Highest  education level: None   Occupational History    None   Tobacco Use    Smoking status: Never    Smokeless tobacco: Never   Vaping Use    Vaping status: Never Used   Substance and Sexual Activity    Alcohol use: No    Drug use: Never    Sexual activity: Not Currently     Partners: Female     Birth control/protection: None   Other Topics Concern    None   Social History Narrative    None     Social Determinants of Health     Financial Resource Strain: Low Risk  (3/28/2023)    Overall Financial Resource Strain (CARDIA)     Difficulty of Paying Living Expenses: Not very hard   Food Insecurity: No Food Insecurity (4/18/2024)    Hunger Vital Sign     Worried About Running Out of Food in the Last Year: Never true     Ran Out of Food in the Last Year: Never true   Transportation Needs: No Transportation Needs (4/18/2024)    PRAPARE - Transportation     Lack of Transportation (Medical): No     Lack of Transportation (Non-Medical): No   Physical Activity: Not on file   Stress: Not on file   Social Connections: Not on file   Intimate Partner Violence: Not on file   Housing Stability: Unknown (4/18/2024)    Housing Stability Vital Sign     Unable to Pay for Housing in the Last Year: No     Number of Places Lived in the Last Year: Not on file     Unstable Housing in the Last Year: No      Medications and Allergies:     Current Outpatient Medications   Medication Sig Dispense Refill    aspirin 81 MG tablet Take 1 tablet by mouth daily Except sun       atorvastatin (LIPITOR) 10 mg tablet TAKE 1 TABLET DAILY 90 tablet 3    cephalexin (KEFLEX) 500 mg capsule For dental visits  0    glimepiride (AMARYL) 1 mg tablet TAKE 1 TABLET DAILY 90 tablet 1    glucose blood (OneTouch Ultra) test strip Patient tests once daily. 100 strip 1    Lancets (OneTouch Delica Plus [id number]) MISC Use 1 application. daily 100 each 1    leuprolide (Eligard) 22.5 mg Inject 22.5 mg under the skin every 3 (three) months      metoprolol tartrate (LOPRESSOR) 25  mg tablet TAKE 1 TABLET EVERY EVENING (TAKING 50 MG TABLET IN THE MORNING AND 25 MG TABLET IN THE EVENING) 90 tablet 1    metoprolol tartrate (LOPRESSOR) 50 mg tablet TAKE 1 TABLET EVERY MORNING (TAKING BOTH A 50 MG TABLET IN THE MORNING AND A 25 MG TABLET IN THE EVENING) 90 tablet 3    Multiple Vitamins-Minerals (MULTIVITAMIN ADULT PO) Take 1 tablet by mouth every other day.      acetaminophen (TYLENOL) 325 mg tablet Take 650 mg by mouth every 6 (six) hours as needed for mild pain (Patient not taking: Reported on 2/5/2024)       No current facility-administered medications for this visit.     Allergies   Allergen Reactions    Penicillins Rash    Plavix [Clopidogrel] Rash      Immunizations:     Immunization History   Administered Date(s) Administered    COVID-19 MODERNA VACC 0.5 ML IM 01/19/2021, 02/16/2021, 10/21/2021    COVID-19 Moderna Vac BIVALENT 12 Yr+ IM 0.5 ML 09/10/2022    COVID-19 Moderna vac 6-11y or adult booster 50 mcg/0.5 mL 04/13/2022    INFLUENZA 09/26/2022    Influenza Split High Dose Preservative Free IM 12/04/2012, 12/13/2013, 10/01/2014, 09/24/2015, 11/09/2016, 11/02/2017    Influenza, high dose seasonal 0.7 mL 10/26/2018, 10/08/2019, 10/14/2020, 11/17/2021, 09/26/2022    Pneumococcal Conjugate 13-Valent 02/23/2016    Pneumococcal Polysaccharide PPV23 12/13/2011    Zoster 05/30/2013    Zoster Vaccine Recombinant 06/12/2019, 08/15/2019      Health Maintenance:         Topic Date Due    Colorectal Cancer Screening  04/26/2026         Topic Date Due    COVID-19 Vaccine (6 - 2023-24 season) 09/01/2023      Medicare Screening Tests and Risk Assessments:     Jhonny is here for his Subsequent Wellness visit. Last Medicare Wellness visit information reviewed, patient interviewed and updates made to the record today.      Health Risk Assessment:   Patient rates overall health as good. Patient feels that their physical health rating is slightly worse. Patient is satisfied with their life. Eyesight was rated  as same. Hearing was rated as same. Patient feels that their emotional and mental health rating is same. Patients states they are never, rarely angry. Patient states they are never, rarely unusually tired/fatigued. Pain experienced in the last 7 days has been some. Patient's pain rating has been 7/10. Patient states that he has experienced no weight loss or gain in last 6 months.     Depression Screening:   PHQ-2 Score: 0      Fall Risk Screening:   In the past year, patient has experienced: no history of falling in past year      Home Safety:  Patient does not have trouble with stairs inside or outside of their home. Patient has working smoke alarms and has working carbon monoxide detector. Home safety hazards include: none.     Nutrition:   Current diet is Low Carb.     Medications:   Patient is currently taking over-the-counter supplements. OTC medications include: see medication list. Patient is able to manage medications.     Activities of Daily Living (ADLs)/Instrumental Activities of Daily Living (IADLs):   Walk and transfer into and out of bed and chair?: Yes  Dress and groom yourself?: Yes    Bathe or shower yourself?: Yes    Feed yourself? Yes  Do your laundry/housekeeping?: Yes  Manage your money, pay your bills and track your expenses?: Yes  Make your own meals?: Yes    Do your own shopping?: Yes    Previous Hospitalizations:   Any hospitalizations or ED visits within the last 12 months?: Yes    How many hospitalizations have you had in the last year?: 1-2    Advance Care Planning:   Living will: Yes    Durable POA for healthcare: Yes    Advanced directive: Yes      Cognitive Screening:   Provider or family/friend/caregiver concerned regarding cognition?: No    PREVENTIVE SCREENINGS      Cardiovascular Screening:    General: History Lipid Disorder and Screening Current      Diabetes Screening:     General: Screening Not Indicated and History Diabetes      Colorectal Cancer Screening:     General:  "Screening Not Indicated      Prostate Cancer Screening:    General: History Prostate Cancer and Screening Not Indicated      Osteoporosis Screening:    General: Screening Not Indicated      Abdominal Aortic Aneurysm (AAA) Screening:        General: Screening Not Indicated      Lung Cancer Screening:     General: Screening Not Indicated      Hepatitis C Screening:    General: Screening Not Indicated    Screening, Brief Intervention, and Referral to Treatment (SBIRT)    Screening  Typical number of drinks in a day: 0  Typical number of drinks in a week: 0  Interpretation: Low risk drinking behavior.    AUDIT-C Screenin) How often did you have a drink containing alcohol in the past year? never  2) How many drinks did you have on a typical day when you were drinking in the past year? 0  3) How often did you have 6 or more drinks on one occasion in the past year? never    AUDIT-C Score: 0  Interpretation: Score 0-3 (male): Negative screen for alcohol misuse    Single Item Drug Screening:  How often have you used an illegal drug (including marijuana) or a prescription medication for non-medical reasons in the past year? never    Single Item Drug Screen Score: 0  Interpretation: Negative screen for possible drug use disorder    Brief Intervention  Alcohol & drug use screenings were reviewed. No concerns regarding substance use disorder identified.     Other Counseling Topics:   Calcium and vitamin D intake and regular weightbearing exercise.          Physical Exam:     /78 (BP Location: Left arm, Patient Position: Sitting, Cuff Size: Large)   Pulse 63   Temp 97.8 °F (36.6 °C)   Resp 18   Ht 5' 10\" (1.778 m)   Wt 72.1 kg (159 lb)   SpO2 99%   BMI 22.81 kg/m²     BP Readings from Last 3 Encounters:   24 136/78   24 140/80   23 (!) 172/79      Wt Readings from Last 3 Encounters:   24 72.1 kg (159 lb)   24 72.7 kg (160 lb 4.8 oz)   10/09/23 74.6 kg (164 lb 8 oz) "           Physical Exam  Constitutional:       General: He is not in acute distress.  HENT:      Head: Atraumatic.   Eyes:      General: No scleral icterus.     Conjunctiva/sclera: Conjunctivae normal.   Neck:      Thyroid: No thyroid mass or thyromegaly.      Vascular: Normal carotid pulses. No carotid bruit or JVD.      Trachea: No tracheal deviation.   Cardiovascular:      Rate and Rhythm: Normal rate and regular rhythm.      Heart sounds: Normal heart sounds. No murmur heard.     No gallop.      Comments: Resolving ecchymosis right lateral chest wall with mild tenderness.  Pulmonary:      Effort: Pulmonary effort is normal. No respiratory distress.      Breath sounds: Normal breath sounds. No wheezing or rales.   Chest:      Chest wall: Tenderness present.   Abdominal:      General: Bowel sounds are normal. There is no distension.      Palpations: Abdomen is soft. There is no mass.      Tenderness: There is no abdominal tenderness. There is no guarding or rebound.   Musculoskeletal:      Lumbar back: No tenderness or bony tenderness. Negative right straight leg raise test and negative left straight leg raise test.      Right lower leg: No edema.      Left lower leg: No edema.      Comments: Inspection R shoulder suspected small effusion. No warmth or redness. + R  tenderness R subacromial area. Full ROM with abduction, internal and external rotation. Negative cross arm test. Negative Speed test. Negative Yergason sign. + impingement sign. + empty can sign. Negative sulcus sign.      Lymphadenopathy:      Cervical: No cervical adenopathy.   Neurological:      Mental Status: He is alert and oriented to person, place, and time.      Motor: Weakness present.      Comments: Weakness L foot with dorsiflexion    Psychiatric:         Mood and Affect: Mood normal.         Cognition and Memory: Cognition normal.          Jorge Beckford MD

## 2024-04-25 NOTE — PROGRESS NOTES
Assessment and Plan:     Problem List Items Addressed This Visit    None       Preventive health issues were discussed with patient, and age appropriate screening tests were ordered as noted in patient's After Visit Summary.  Personalized health advice and appropriate referrals for health education or preventive services given if needed, as noted in patient's After Visit Summary.     History of Present Illness:     Patient presents for a Medicare Wellness Visit    HPI   Patient Care Team:  Jorge Beckford MD as PCP - General  MD Jorge Kaba MD John R Anderson, MD (Urology)  Suman Tovar MD (Dermatology)  Dorie Julien DPM (Podiatry)     Review of Systems:     Review of Systems     Problem List:     Patient Active Problem List   Diagnosis    Aortic valve regurgitation    CAD (coronary artery disease)    Carpal tunnel syndrome    Disc degeneration, lumbar    Facet arthritis of lumbar region    Glaucoma    Hyperlipidemia    Hypertension    Lumbar canal stenosis    Mitral regurgitation    Prostate cancer (HCC)    Type 2 diabetes mellitus (HCC)    SSS (sick sinus syndrome) (HCC)      Past Medical and Surgical History:     Past Medical History:   Diagnosis Date    Basal cell carcinoma     Benign polyp of large intestine     Cancer (HCC)     Diabetes mellitus (HCC) 2010    Osteoarthritis of left hip     last assessed 09Jan2017    Piriformis syndrome of left side     last assessed 18Oct2016     Past Surgical History:   Procedure Laterality Date    APPENDECTOMY  1966    CARDIAC PACEMAKER PLACEMENT  05/01/2005    CATARACT EXTRACTION Left 10/01/1995    CATARACT EXTRACTION Right 02/01/2000    CORONARY ANGIOPLASTY  01/01/2010 2010    CORONARY ANGIOPLASTY WITH STENT PLACEMENT  11/01/2002    EYE SURGERY  Cataract  2000 & 2002    INGUINAL HERNIA REPAIR Right 01/01/1998    INGUINAL HERNIA REPAIR Left 01/01/2009    2009    JOINT REPLACEMENT  2010 &  2017    LUMBAR LAMINECTOMY  01/01/2002     2002    REPLACEMENT TOTAL KNEE Right 06/01/2010    right total knee replacement with complications    RHIZOTOMY      TONSILLECTOMY AND ADENOIDECTOMY  01/01/1937    1937    TOTAL HIP ARTHROPLASTY Left 01/18/2017      Family History:     Family History   Problem Relation Age of Onset    Coronary artery disease Mother     Diabetes Maternal Grandmother       Social History:     Social History     Socioeconomic History    Marital status: /Civil Union     Spouse name: None    Number of children: None    Years of education: None    Highest education level: None   Occupational History    None   Tobacco Use    Smoking status: Never    Smokeless tobacco: Never   Vaping Use    Vaping status: Never Used   Substance and Sexual Activity    Alcohol use: No    Drug use: Never    Sexual activity: Not Currently     Partners: Female     Birth control/protection: None   Other Topics Concern    None   Social History Narrative    None     Social Determinants of Health     Financial Resource Strain: Low Risk  (3/28/2023)    Overall Financial Resource Strain (CARDIA)     Difficulty of Paying Living Expenses: Not very hard   Food Insecurity: No Food Insecurity (4/18/2024)    Hunger Vital Sign     Worried About Running Out of Food in the Last Year: Never true     Ran Out of Food in the Last Year: Never true   Transportation Needs: No Transportation Needs (4/18/2024)    PRAPARE - Transportation     Lack of Transportation (Medical): No     Lack of Transportation (Non-Medical): No   Physical Activity: Not on file   Stress: Not on file   Social Connections: Not on file   Intimate Partner Violence: Not on file   Housing Stability: Unknown (4/18/2024)    Housing Stability Vital Sign     Unable to Pay for Housing in the Last Year: No     Number of Places Lived in the Last Year: Not on file     Unstable Housing in the Last Year: No      Medications and Allergies:     Current Outpatient Medications   Medication Sig Dispense Refill     aspirin 81 MG tablet Take 1 tablet by mouth daily Except sun       atorvastatin (LIPITOR) 10 mg tablet TAKE 1 TABLET DAILY 90 tablet 3    cephalexin (KEFLEX) 500 mg capsule For dental visits  0    glimepiride (AMARYL) 1 mg tablet TAKE 1 TABLET DAILY 90 tablet 1    glucose blood (OneTouch Ultra) test strip Patient tests once daily. 100 strip 1    Lancets (OneTouch Delica Plus [id number]) MISC Use 1 application. daily 100 each 1    leuprolide (Eligard) 22.5 mg Inject 22.5 mg under the skin every 3 (three) months      metoprolol tartrate (LOPRESSOR) 25 mg tablet TAKE 1 TABLET EVERY EVENING (TAKING 50 MG TABLET IN THE MORNING AND 25 MG TABLET IN THE EVENING) 90 tablet 1    metoprolol tartrate (LOPRESSOR) 50 mg tablet TAKE 1 TABLET EVERY MORNING (TAKING BOTH A 50 MG TABLET IN THE MORNING AND A 25 MG TABLET IN THE EVENING) 90 tablet 3    Multiple Vitamins-Minerals (MULTIVITAMIN ADULT PO) Take 1 tablet by mouth every other day.      acetaminophen (TYLENOL) 325 mg tablet Take 650 mg by mouth every 6 (six) hours as needed for mild pain (Patient not taking: Reported on 2/5/2024)       No current facility-administered medications for this visit.     Allergies   Allergen Reactions    Penicillins Rash    Plavix [Clopidogrel] Rash      Immunizations:     Immunization History   Administered Date(s) Administered    COVID-19 MODERNA VACC 0.5 ML IM 01/19/2021, 02/16/2021, 10/21/2021    COVID-19 Moderna Vac BIVALENT 12 Yr+ IM 0.5 ML 09/10/2022    COVID-19 Moderna vac 6-11y or adult booster 50 mcg/0.5 mL 04/13/2022    INFLUENZA 09/26/2022    Influenza Split High Dose Preservative Free IM 12/04/2012, 12/13/2013, 10/01/2014, 09/24/2015, 11/09/2016, 11/02/2017    Influenza, high dose seasonal 0.7 mL 10/26/2018, 10/08/2019, 10/14/2020, 11/17/2021, 09/26/2022    Pneumococcal Conjugate 13-Valent 02/23/2016    Pneumococcal Polysaccharide PPV23 12/13/2011    Zoster 05/30/2013    Zoster Vaccine Recombinant 06/12/2019, 08/15/2019      Health  "Maintenance:         Topic Date Due    Colorectal Cancer Screening  04/26/2026         Topic Date Due    COVID-19 Vaccine (6 - 2023-24 season) 09/01/2023      Medicare Screening Tests and Risk Assessments:     Annual Wellness Visit  No results found.     Physical Exam:     /78 (BP Location: Left arm, Patient Position: Sitting, Cuff Size: Large)   Pulse 63   Temp 97.8 °F (36.6 °C)   Resp 18   Ht 5' 10\" (1.778 m)   Wt 72.1 kg (159 lb)   SpO2 99%   BMI 22.81 kg/m²     Physical Exam     Jorge Beckford MD  "

## 2024-04-30 ENCOUNTER — TELEPHONE (OUTPATIENT)
Age: 89
End: 2024-04-30

## 2024-04-30 NOTE — TELEPHONE ENCOUNTER
Patient's wife called and stated they received a call from [employer] stating that they should both get the RSV and latest COVID vaccines.  She would like to know if Dr. Beckford thinks they should both have the vaccines.  Hilaria stated they are just as happy if they don't need it but would like to know Dr. Beckford thinks they need them.  Please advise.  Thank you!

## 2024-05-20 ENCOUNTER — REMOTE DEVICE CLINIC VISIT (OUTPATIENT)
Dept: CARDIOLOGY CLINIC | Facility: CLINIC | Age: 89
End: 2024-05-20
Payer: MEDICARE

## 2024-05-20 DIAGNOSIS — Z95.0 CARDIAC PACEMAKER IN SITU: Primary | ICD-10-CM

## 2024-05-20 PROCEDURE — 93294 REM INTERROG EVL PM/LDLS PM: CPT | Performed by: STUDENT IN AN ORGANIZED HEALTH CARE EDUCATION/TRAINING PROGRAM

## 2024-05-20 PROCEDURE — 93296 REM INTERROG EVL PM/IDS: CPT | Performed by: STUDENT IN AN ORGANIZED HEALTH CARE EDUCATION/TRAINING PROGRAM

## 2024-05-20 NOTE — PROGRESS NOTES
Results for orders placed or performed in visit on 05/20/24   Cardiac EP device report    Narrative    MDT DUAL PPM - NOT MRI CONDITIONAL  CARELINK TRANSMISSION: BATTERY VOLTAGE ADEQUATE (4 YRS). AP-93%, -97% (>40% AVB/MVP@60PPM). ALL AVAILABLE LEAD PARAMETERS WITHIN NORMAL LIMITS. NO SIGNIFICANT HIGH RATE EPISODES. NORMAL DEVICE FUNCTION. GV

## 2024-07-26 NOTE — PROGRESS NOTES
Cardiology Follow Up    Jhonny Knowles .  11/16/1934  [id number]  [facility]  [address] [place] [place] [id number] [phone]    1. Essential (primary) hypertension        2. Presence of permanent cardiac pacemaker        3. Pure hypercholesterolemia        4. Coronary arteriosclerosis        5. Non-rheumatic mitral regurgitation            Interval History: Patient is here for f/u visit.  Most recent Medtronic pacer PPM interogation 5/2024 demonstrated an appropriately functioning device with no arrhythmia noted. He has CAD with PCI of the LAD.  Echocardiogram 2/2018 demonstrated preserved LV systolic function and no significant valve disease.  Patient had a lipid profile 4/2024 which demonstrated total cholesterol of 142 with an HDL of 41 and a calculated LDL of 74.  He is on atorvastatin.  He has prostate cancer and has ongoing follow-up with urology. He went to the [facility] 12/2022 with left shoulder discomfort.  He was concerned about a cardiac etiology. He injured his left leg in a fall at his home 12/2023.  He had left hip replacement on that side.  He has had no chest pain or significant dyspnea.  His vital signs are stable today.  He has great grandchildren named Silva and Esau.  His wife is here today.  Patient's HTN, HLD and CAD are stable on his current medicine      Patient Active Problem List   Diagnosis   • Aortic valve regurgitation   • CAD (coronary artery disease)   • Carpal tunnel syndrome   • Disc degeneration, lumbar   • Facet arthritis of lumbar region   • Glaucoma   • Hyperlipidemia   • Hypertension   • Lumbar canal stenosis   • Mitral regurgitation   • Prostate cancer (HCC)   • Type 2 diabetes mellitus (HCC)   • SSS (sick sinus syndrome) ([facility])     Past Medical History:   Diagnosis Date   • Basal cell carcinoma    • Benign polyp of large intestine    • Cancer (HCC)    • Diabetes mellitus (HCC)  2010   • Osteoarthritis of left hip     last assessed 09Jan2017   • Piriformis syndrome of left side     last assessed 18Oct2016     Social History     Socioeconomic History   • Marital status: /Civil Union     Spouse name: Not on file   • Number of children: Not on file   • Years of education: Not on file   • Highest education level: Not on file   Occupational History   • Not on file   Tobacco Use   • Smoking status: Never   • Smokeless tobacco: Never   Vaping Use   • Vaping status: Never Used   Substance and Sexual Activity   • Alcohol use: No   • Drug use: Never   • Sexual activity: Not Currently     Partners: Female     Birth control/protection: None   Other Topics Concern   • Not on file   Social History Narrative   • Not on file     Social Determinants of Health     Financial Resource Strain: Low Risk  (3/28/2023)    Overall Financial Resource Strain (CARDIA)    • Difficulty of Paying Living Expenses: Not very hard   Food Insecurity: No Food Insecurity (4/25/2024)    Hunger Vital Sign    • Worried About Running Out of Food in the Last Year: Never true    • Ran Out of Food in the Last Year: Never true   Transportation Needs: No Transportation Needs (4/25/2024)    PRAPARE - Transportation    • Lack of Transportation (Medical): No    • Lack of Transportation (Non-Medical): No   Physical Activity: Not on file   Stress: Not on file   Social Connections: Not on file   Intimate Partner Violence: Not on file   Housing Stability: Low Risk  (4/25/2024)    Housing Stability Vital Sign    • Unable to Pay for Housing in the Last Year: No    • Number of Times Moved in the Last Year: 1    • Homeless in the Last Year: No      Family History   Problem Relation Age of Onset   • Coronary artery disease Mother    • Diabetes Maternal Grandmother      Past Surgical History:   Procedure Laterality Date   • APPENDECTOMY  1966   • CARDIAC PACEMAKER PLACEMENT  05/01/2005   • CATARACT EXTRACTION Left 10/01/1995   • CATARACT  EXTRACTION Right 02/01/2000   • CORONARY ANGIOPLASTY  01/01/2010 2010   • CORONARY ANGIOPLASTY WITH STENT PLACEMENT  11/01/2002   • EYE SURGERY  Cataract  2000 & 2002   • INGUINAL HERNIA REPAIR Right 01/01/1998   • INGUINAL HERNIA REPAIR Left 01/01/2009 2009   • JOINT REPLACEMENT  2010 &  2017   • LUMBAR LAMINECTOMY  01/01/2002 2002   • REPLACEMENT TOTAL KNEE Right 06/01/2010    right total knee replacement with complications   • RHIZOTOMY     • TONSILLECTOMY AND ADENOIDECTOMY  01/01/1937    1937   • TOTAL HIP ARTHROPLASTY Left 01/18/2017       Current Outpatient Medications:   •  aspirin 81 MG tablet, Take 1 tablet by mouth daily Except sun , Disp: , Rfl:   •  atorvastatin (LIPITOR) 10 mg tablet, TAKE 1 TABLET DAILY, Disp: 90 tablet, Rfl: 3  •  cephalexin (KEFLEX) 500 mg capsule, For dental visits, Disp: , Rfl: 0  •  glimepiride (AMARYL) 1 mg tablet, TAKE 1 TABLET DAILY, Disp: 90 tablet, Rfl: 1  •  glucose blood (OneTouch Ultra) test strip, Patient tests once daily., Disp: 100 strip, Rfl: 1  •  Lancets (OneTouch Delica Plus [id number]) MISC, Use 1 application. daily, Disp: 100 each, Rfl: 1  •  leuprolide (Eligard) 22.5 mg, Inject 22.5 mg under the skin every 3 (three) months, Disp: , Rfl:   •  metoprolol tartrate (LOPRESSOR) 25 mg tablet, TAKE 1 TABLET EVERY EVENING (TAKING 50 MG TABLET IN THE MORNING AND 25 MG TABLET IN THE EVENING), Disp: 90 tablet, Rfl: 1  •  metoprolol tartrate (LOPRESSOR) 50 mg tablet, TAKE 1 TABLET EVERY MORNING (TAKING BOTH A 50 MG TABLET IN THE MORNING AND A 25 MG TABLET IN THE EVENING), Disp: 90 tablet, Rfl: 3  •  Multiple Vitamins-Minerals (MULTIVITAMIN ADULT PO), Take 1 tablet by mouth every other day., Disp: , Rfl:   •  acetaminophen (TYLENOL) 325 mg tablet, Take 650 mg by mouth every 6 (six) hours as needed for mild pain (Patient not taking: Reported on 2/5/2024), Disp: , Rfl:   Allergies   Allergen Reactions   • Penicillins Rash   • Plavix [Clopidogrel] Rash       Labs:not  "applicable  Imaging: No results found.    Review of Systems:  Review of Systems   All other systems reviewed and are negative.      Physical Exam:  /56 (BP Location: Left arm, Patient Position: Sitting, Cuff Size: Standard)   Ht 5' 10\" (1.778 m)   Wt 69.9 kg (154 lb 1.6 oz)   SpO2 98%   BMI 22.11 kg/m²   Physical Exam  Vitals reviewed.   Constitutional:       Appearance: He is well-developed.   HENT:      Head: Normocephalic and atraumatic.   Cardiovascular:      Rate and Rhythm: Normal rate.      Heart sounds: Normal heart sounds.   Pulmonary:      Effort: Pulmonary effort is normal.      Breath sounds: Normal breath sounds.   Musculoskeletal:      Cervical back: Normal range of motion.   Skin:     General: Skin is warm and dry.   Neurological:      Mental Status: He is alert and oriented to person, place, and time.         Discussion/Summary:I will continue the patient's present medical regimen.  The patient appears well compensated.  I have asked the patient to call if there is a problem in the interim otherwise I will see the patient in six months time.  "

## 2024-08-05 ENCOUNTER — OFFICE VISIT (OUTPATIENT)
Dept: CARDIOLOGY CLINIC | Facility: CLINIC | Age: 89
End: 2024-08-05
Payer: MEDICARE

## 2024-08-05 ENCOUNTER — IN-CLINIC DEVICE VISIT (OUTPATIENT)
Dept: CARDIOLOGY CLINIC | Facility: CLINIC | Age: 89
End: 2024-08-05
Payer: MEDICARE

## 2024-08-05 VITALS
WEIGHT: 154.1 LBS | HEIGHT: 70 IN | DIASTOLIC BLOOD PRESSURE: 56 MMHG | BODY MASS INDEX: 22.06 KG/M2 | OXYGEN SATURATION: 98 % | SYSTOLIC BLOOD PRESSURE: 116 MMHG

## 2024-08-05 DIAGNOSIS — I25.10 CORONARY ARTERIOSCLEROSIS: ICD-10-CM

## 2024-08-05 DIAGNOSIS — I34.0 NON-RHEUMATIC MITRAL REGURGITATION: ICD-10-CM

## 2024-08-05 DIAGNOSIS — Z95.0 PRESENCE OF CARDIAC PACEMAKER: Primary | ICD-10-CM

## 2024-08-05 DIAGNOSIS — I10 ESSENTIAL (PRIMARY) HYPERTENSION: Primary | ICD-10-CM

## 2024-08-05 DIAGNOSIS — E78.00 PURE HYPERCHOLESTEROLEMIA: ICD-10-CM

## 2024-08-05 DIAGNOSIS — Z95.0 PRESENCE OF PERMANENT CARDIAC PACEMAKER: ICD-10-CM

## 2024-08-05 PROCEDURE — 93296 REM INTERROG EVL PM/IDS: CPT | Performed by: STUDENT IN AN ORGANIZED HEALTH CARE EDUCATION/TRAINING PROGRAM

## 2024-08-05 PROCEDURE — 93294 REM INTERROG EVL PM/LDLS PM: CPT | Performed by: STUDENT IN AN ORGANIZED HEALTH CARE EDUCATION/TRAINING PROGRAM

## 2024-08-05 PROCEDURE — 99214 OFFICE O/P EST MOD 30 MIN: CPT | Performed by: INTERNAL MEDICINE

## 2024-08-05 NOTE — PROGRESS NOTES
Results for orders placed or performed in visit on 08/05/24   Cardiac EP device report    Narrative    MDT DUAL PPM - NOT MRI CONDITIONAL  [facility] BATTERY VOLTAGE ADEQUATE (4 YRS). AP 92.7%  97.5% (>40%/AVB) ALL LEAD PARAMETERS WITHIN NORMAL LIMITS. NO SIGNIFICANT HIGH RATE EPISODES. NO PROGRAMMING CHANGES MADE TO DEVICE PARAMETERS. NORMAL DEVICE FUNCTION. AM

## 2024-09-05 DIAGNOSIS — E11.9 TYPE 2 DIABETES MELLITUS WITHOUT COMPLICATION, WITHOUT LONG-TERM CURRENT USE OF INSULIN (HCC): ICD-10-CM

## 2024-09-05 DIAGNOSIS — E78.00 PURE HYPERCHOLESTEROLEMIA: ICD-10-CM

## 2024-09-05 RX ORDER — GLIMEPIRIDE 1 MG/1
1 TABLET ORAL DAILY
Qty: 90 TABLET | Refills: 1 | Status: SHIPPED | OUTPATIENT
Start: 2024-09-05

## 2024-09-05 RX ORDER — ATORVASTATIN CALCIUM 10 MG/1
TABLET, FILM COATED ORAL
Qty: 90 TABLET | Refills: 1 | Status: SHIPPED | OUTPATIENT
Start: 2024-09-05

## 2024-09-12 ENCOUNTER — APPOINTMENT (EMERGENCY)
Dept: CT IMAGING | Facility: HOSPITAL | Age: 89
End: 2024-09-12
Payer: MEDICARE

## 2024-09-12 ENCOUNTER — HOSPITAL ENCOUNTER (EMERGENCY)
Facility: HOSPITAL | Age: 89
Discharge: HOME | End: 2024-09-12
Attending: EMERGENCY MEDICINE
Payer: MEDICARE

## 2024-09-12 VITALS
SYSTOLIC BLOOD PRESSURE: 170 MMHG | RESPIRATION RATE: 17 BRPM | OXYGEN SATURATION: 98 % | DIASTOLIC BLOOD PRESSURE: 78 MMHG | TEMPERATURE: 98 F | HEART RATE: 60 BPM

## 2024-09-12 DIAGNOSIS — S09.90XA CLOSED HEAD INJURY, INITIAL ENCOUNTER: Primary | ICD-10-CM
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
## 2024-09-12 PROCEDURE — 93005 ELECTROCARDIOGRAM TRACING: CPT

## 2024-09-12 PROCEDURE — 99285 EMERGENCY DEPT VISIT HI MDM: CPT

## 2024-09-12 PROCEDURE — 99284 EMERGENCY DEPT VISIT MOD MDM: CPT | Performed by: EMERGENCY MEDICINE

## 2024-09-12 PROCEDURE — 70450 CT HEAD/BRAIN W/O DYE: CPT

## 2024-09-12 NOTE — ED PROVIDER NOTES
Pt Name: Jhonny Knowles .  MRN: [id number]  Birthdate 11/16/1934  Age/Sex: 89 y.o. male  Date of evaluation: 9/12/2024  PCP: Jorge Beckford MD    CHIEF COMPLAINT    Chief Complaint   Patient presents with    Fall     Pt tripped landing on tailbone and falling back hitting head. +head strike, +asa, -LOC, -thinners       Final IMPRESSION    Final diagnoses:   Fall, initial encounter   Closed head injury, initial encounter         DISPOSITION/PLAN    89-year-old male with closed injury status post mechanical fall.  Vital signs reassuring, examination likewise reassuring with nonfocal neurologic exam, no evidence of other injuries on careful history and physical examination.  CT head obtained due to fall on aspirin, showed no bleed, skull fracture, or other acute abnormality.  Discussed findings with patient, discharged with strict return precautions, follow-up with primary care doctor.  Mechanism of Injury: fall    LOC:  none  Airway:  intact  Breathing: Equal breath sounds bilaterally  Circulation: 2+ pulses x 4  Disability: None  Exposure: As indicated        Numbers; 3-15 (gcs): 15      Alcohol Use: none      Pt Direct To CT: no         HPI    89 y.o. male presenting with head injury.  Patient states he was walking between his house and garage, tripped on the small rubber and metal transition piece, lost his balance and fell, landing first on his buttocks followed by tipping backwards and striking the back right side of his head.  He notes mild pain to the area which is dull, nonradiating, unchanged with any factors, he denies any other pain or injuries.  He denies loss of consciousness, numbness, weakness, chest pain, nausea, vomiting, shortness of breath, other symptoms.        Fall  Associated symptoms: headaches    Associated symptoms: no abdominal pain, no back pain, no chest pain, no nausea, no neck pain, no seizures and no vomiting          Past Medical and Surgical History    Past Medical History:    Diagnosis Date    Basal cell carcinoma     Benign polyp of large intestine     Cancer (HCC)     Diabetes mellitus (HCC) 2010    Osteoarthritis of left hip     last assessed 09Jan2017    Piriformis syndrome of left side     last assessed 18Oct2016       Past Surgical History:   Procedure Laterality Date    APPENDECTOMY  1966    CARDIAC PACEMAKER PLACEMENT  05/01/2005    CATARACT EXTRACTION Left 10/01/1995    CATARACT EXTRACTION Right 02/01/2000    CORONARY ANGIOPLASTY  01/01/2010 2010    CORONARY ANGIOPLASTY WITH STENT PLACEMENT  11/01/2002    EYE SURGERY  Cataract  2000 & 2002    INGUINAL HERNIA REPAIR Right 01/01/1998    INGUINAL HERNIA REPAIR Left 01/01/2009    2009    JOINT REPLACEMENT  2010 &  2017    LUMBAR LAMINECTOMY  01/01/2002    2002    REPLACEMENT TOTAL KNEE Right 06/01/2010    right total knee replacement with complications    RHIZOTOMY      TONSILLECTOMY AND ADENOIDECTOMY  01/01/1937    1937    TOTAL HIP ARTHROPLASTY Left 01/18/2017       Family History   Problem Relation Age of Onset    Coronary artery disease Mother     Diabetes Maternal Grandmother        Social History     Tobacco Use    Smoking status: Never    Smokeless tobacco: Never   Vaping Use    Vaping status: Never Used   Substance Use Topics    Alcohol use: No    Drug use: Never           Allergies    Allergies   Allergen Reactions    Penicillins Rash    Plavix [Clopidogrel] Rash       Home Medications    Prior to Admission medications    Medication Sig Start Date End Date Taking? Authorizing Provider   acetaminophen (TYLENOL) 325 mg tablet Take 650 mg by mouth every 6 (six) hours as needed for mild pain  Patient not taking: Reported on 2/5/2024    Historical Provider, MD   aspirin 81 MG tablet Take 1 tablet by mouth daily Except [place]     Historical Provider, MD   atorvastatin (LIPITOR) 10 mg tablet TAKE 1 TABLET DAILY 9/5/24   Jorge العراقي MD   cephalexin (KEFLEX) 500 mg capsule For dental visits 3/16/19   Historical  Provider, MD   glimepiride (AMARYL) 1 mg tablet TAKE 1 TABLET DAILY 9/5/24   Jorge Beckford MD   glucose blood (OneTouch Ultra) test strip Patient tests once daily. 4/12/24   Jorge Beckford MD   Lancets (OneTouch Delica Plus [id number]) MISC Use 1 application. daily 1/29/24   Jorge Beckford MD   leuprolide (Eligard) 22.5 mg Inject 22.5 mg under the skin every 3 (three) months    Historical Provider, MD   metoprolol tartrate (LOPRESSOR) 25 mg tablet TAKE 1 TABLET EVERY EVENING (TAKING 50 MG TABLET IN THE MORNING AND 25 MG TABLET IN THE EVENING) 4/16/24   Jorge العراقي MD   metoprolol tartrate (LOPRESSOR) 50 mg tablet TAKE 1 TABLET EVERY MORNING (TAKING BOTH A 50 MG TABLET IN THE MORNING AND A 25 MG TABLET IN THE EVENING) 2/5/24   Jorge العراقي MD   Multiple Vitamins-Minerals (MULTIVITAMIN ADULT PO) Take 1 tablet by mouth every other day.    Historical Provider, MD           Review of Systems    Review of Systems   Constitutional:  Negative for appetite change, chills and diaphoresis.   HENT:  Negative for drooling, facial swelling, trouble swallowing and voice change.    Respiratory:  Negative for apnea, shortness of breath and wheezing.    Cardiovascular:  Negative for chest pain and leg swelling.   Gastrointestinal:  Negative for abdominal distention, abdominal pain, diarrhea, nausea and vomiting.   Genitourinary:  Negative for dysuria and urgency.   Musculoskeletal:  Negative for arthralgias, back pain, gait problem and neck pain.   Skin:  Negative for color change, rash and wound.   Neurological:  Positive for headaches. Negative for seizures, speech difficulty and weakness.   Psychiatric/Behavioral:  Negative for agitation, behavioral problems and dysphoric mood. The patient is not nervous/anxious.            All other systems reviewed and negative.    Physical Exam      ED Triage Vitals   Temperature Pulse Respirations Blood Pressure SpO2   09/12/24 1707 09/12/24 1706 09/12/24 1706  09/12/24 1706 09/12/24 1706   98 °F (36.7 °C) 84 18 (!) 176/79 97 %      Temp Source Heart Rate Source Patient Position - Orthostatic VS BP Location FiO2 (%)   09/12/24 1707 09/12/24 1706 -- 09/12/24 1706 --   Oral Monitor  Right arm       Pain Score       09/12/24 1706       No Pain               Physical Exam  Vitals and nursing note reviewed.   Constitutional:       General: He is not in acute distress.     Appearance: He is well-developed. He is not ill-appearing, toxic-appearing or diaphoretic.   HENT:      Head: Normocephalic and atraumatic.      Right Ear: External ear normal.      Left Ear: External ear normal.      Nose: Nose normal. No congestion or rhinorrhea.      Mouth/Throat:      Mouth: Mucous membranes are moist.      Pharynx: Oropharynx is clear. No oropharyngeal exudate or posterior oropharyngeal erythema.   Eyes:      Conjunctiva/sclera: Conjunctivae normal.      Pupils: Pupils are equal, round, and reactive to light.   Neck:      Trachea: No tracheal deviation.   Cardiovascular:      Rate and Rhythm: Normal rate and regular rhythm.      Pulses: Normal pulses.      Heart sounds: Normal heart sounds. No murmur heard.  Pulmonary:      Effort: Pulmonary effort is normal. No respiratory distress.      Breath sounds: Normal breath sounds. No stridor. No wheezing or rales.   Abdominal:      General: There is no distension.      Palpations: Abdomen is soft.      Tenderness: There is no abdominal tenderness. There is no guarding or rebound.   Musculoskeletal:         General: No tenderness or deformity. Normal range of motion.      Cervical back: Normal range of motion and neck supple. No rigidity or tenderness.      Right lower leg: No edema.      Left lower leg: No edema.      Comments: No midline spinal tenderness   Skin:     General: Skin is warm and dry.      Capillary Refill: Capillary refill takes less than 2 seconds.      Findings: No rash.   Neurological:      Mental Status: He is alert and  oriented to person, place, and time.      Cranial Nerves: No cranial nerve deficit.      Sensory: No sensory deficit.      Motor: No weakness.      Coordination: Coordination normal.   Psychiatric:         Behavior: Behavior normal.         Thought Content: Thought content normal.         Judgment: Judgment normal.              Diagnostic Results  EKG Interpretation    Rate:  69  BPM  Rhythm: Ventricularly paced rhythm  Axis:  Normal   Intervals: Normal, no blocks, QTc  482 ms  Q waves:  No pathologic Q waves   T waves:  Normal   ST segments:  No significant elevations or depressions     Impression: Ventricularly paced rhythm without evidence of acute ischemia or significant arrhythmia      EKG for comparison: EKG dated 2 December 2022 similar in character no major changes    EKG interpreted by me.       Labs:    Results Reviewed       None            All labs reviewed and utilized in the medical decision making process    Radiology:    CT head without contrast   Final Result      No acute intracranial abnormality.      Moderate chronic microangiopathic ischemic changes.                  Workstation performed: [id number]             All radiology studies independently viewed by me and interpreted by the radiologist.    Procedure    Procedures        ED Course of Care and Re-Assessments      Plan form for CT scan of the head, no indications for other imaging at this time.  No chest pain, shortness of breath, or other indication for chest x-ray.    Medications - No data to display          Time reflects when diagnosis was documented in both MDM as applicable and the Disposition within this note       Time User Action Codes Description Comment    9/12/2024  6:10 PM Karan Horton Add [W19.XXXA] Fall, initial encounter     9/12/2024  6:10 PM Karan Horton Add [S09.90XA] Closed head injury, initial encounter     9/12/2024  6:10 PM Karan Horton Modify [W19.XXXA] Fall, initial encounter     9/12/2024  6:10  "PM Karan Horton Modify [S09.90XA] Closed head injury, initial encounter           ED Disposition       ED Disposition   Discharge    Condition   Stable    Date/Time   Thu Sep 12, 2024  6:10 PM    Comment   Jhonny Knowles Sr. discharge to home/self care.                   Follow-up Information       Follow up With Specialties Details Why Contact Info Additional Information    [facility] Emergency Medicine Go to  If symptoms worsen [facility] [address]  [phone] [facility], [address] [place] [address], [phone]    Jorge Beckford MD Family Medicine Call in 1 day To discuss this visit and schedule close follow-up [address] [place] [id number] [phone]                 PATIENT REFERRED TO:    [facility]  [address] [address]  [phone]  Go to   If symptoms worsen    Jorge Beckford MD  [address] [place] [address]  [phone]    Call in 1 day  To discuss this visit and schedule close follow-up      DISCHARGE MEDICATIONS:    Patient's Medications   Discharge Prescriptions    No medications on file       No discharge procedures on file.         Karan Horton MD    Portions of the record may have been created with voice recognition software.  Occasional wrong word or \"sound alike\" substitutions may have occurred due to the inherent limitations of voice recognition software.  Please read the chart carefully and recognize, using context, where substitutions have occurred     Karan Horton MD  09/12/24 1817    "

## 2024-09-13 ENCOUNTER — TELEPHONE (OUTPATIENT)
Dept: FAMILY MEDICINE CLINIC | Facility: CLINIC | Age: 89
End: 2024-09-13

## 2024-09-13 DIAGNOSIS — C61 PROSTATE CANCER (HCC): Primary | ICD-10-CM

## 2024-09-13 LAB
ATRIAL RATE: 69 BPM
P AXIS: 49 DEGREES
PR INTERVAL: 176 MS
QRS AXIS: -63 DEGREES
QRSD INTERVAL: 170 MS
QT INTERVAL: 450 MS
QTC INTERVAL: 482 MS
T WAVE AXIS: 85 DEGREES
VENTRICULAR RATE: 69 BPM

## 2024-09-13 PROCEDURE — 93010 ELECTROCARDIOGRAM REPORT: CPT | Performed by: INTERNAL MEDICINE

## 2024-10-04 ENCOUNTER — APPOINTMENT (OUTPATIENT)
Dept: LAB | Facility: CLINIC | Age: 89
End: 2024-10-04
Payer: MEDICARE

## 2024-10-04 DIAGNOSIS — C61 PROSTATE CANCER (HCC): ICD-10-CM

## 2024-10-04 LAB
ALBUMIN SERPL BCG-MCNC: 4.4 G/DL (ref 3.5–5)
ALP SERPL-CCNC: 57 U/L (ref 34–104)
ALT SERPL W P-5'-P-CCNC: 15 U/L (ref 7–52)
ANION GAP SERPL CALCULATED.3IONS-SCNC: 10 MMOL/L (ref 4–13)
AST SERPL W P-5'-P-CCNC: 23 U/L (ref 13–39)
BASOPHILS # BLD AUTO: 0.02 THOUSANDS/ÂΜL (ref 0–0.1)
BASOPHILS NFR BLD AUTO: 0 % (ref 0–1)
BILIRUB SERPL-MCNC: 0.56 MG/DL (ref 0.2–1)
BUN SERPL-MCNC: 25 MG/DL (ref 5–25)
CALCIUM SERPL-MCNC: 9.4 MG/DL (ref 8.4–10.2)
CHLORIDE SERPL-SCNC: 106 MMOL/L (ref 96–108)
CHOLEST SERPL-MCNC: 162 MG/DL
CO2 SERPL-SCNC: 26 MMOL/L (ref 21–32)
CREAT SERPL-MCNC: 0.9 MG/DL (ref 0.6–1.3)
EOSINOPHIL # BLD AUTO: 0.24 THOUSAND/ÂΜL (ref 0–0.61)
EOSINOPHIL NFR BLD AUTO: 5 % (ref 0–6)
ERYTHROCYTE [DISTWIDTH] IN BLOOD BY AUTOMATED COUNT: 13.5 % (ref 11.6–15.1)
EST. AVERAGE GLUCOSE BLD GHB EST-MCNC: 123 MG/DL
GFR SERPL CREATININE-BSD FRML MDRD: 75 ML/MIN/1.73SQ M
GLUCOSE P FAST SERPL-MCNC: 99 MG/DL (ref 65–99)
HBA1C MFR BLD: 5.9 %
HCT VFR BLD AUTO: 38.2 % (ref 36.5–49.3)
HDLC SERPL-MCNC: 44 MG/DL
HGB BLD-MCNC: 12.1 G/DL (ref 12–17)
IMM GRANULOCYTES # BLD AUTO: 0.01 THOUSAND/UL (ref 0–0.2)
IMM GRANULOCYTES NFR BLD AUTO: 0 % (ref 0–2)
LDLC SERPL CALC-MCNC: 103 MG/DL (ref 0–100)
LYMPHOCYTES # BLD AUTO: 0.75 THOUSANDS/ÂΜL (ref 0.6–4.47)
LYMPHOCYTES NFR BLD AUTO: 15 % (ref 14–44)
MCH RBC QN AUTO: 30.4 PG (ref 26.8–34.3)
MCHC RBC AUTO-ENTMCNC: 31.7 G/DL (ref 31.4–37.4)
MCV RBC AUTO: 96 FL (ref 82–98)
MONOCYTES # BLD AUTO: 0.53 THOUSAND/ÂΜL (ref 0.17–1.22)
MONOCYTES NFR BLD AUTO: 11 % (ref 4–12)
NEUTROPHILS # BLD AUTO: 3.44 THOUSANDS/ÂΜL (ref 1.85–7.62)
NEUTS SEG NFR BLD AUTO: 69 % (ref 43–75)
NONHDLC SERPL-MCNC: 118 MG/DL
NRBC BLD AUTO-RTO: 0 /100 WBCS
PLATELET # BLD AUTO: 236 THOUSANDS/UL (ref 149–390)
PMV BLD AUTO: 10.9 FL (ref 8.9–12.7)
POTASSIUM SERPL-SCNC: 4.1 MMOL/L (ref 3.5–5.3)
PROT SERPL-MCNC: 7.1 G/DL (ref 6.4–8.4)
PSA SERPL-MCNC: 0.09 NG/ML (ref 0–4)
RBC # BLD AUTO: 3.98 MILLION/UL (ref 3.88–5.62)
SODIUM SERPL-SCNC: 142 MMOL/L (ref 135–147)
TRIGL SERPL-MCNC: 77 MG/DL
WBC # BLD AUTO: 4.99 THOUSAND/UL (ref 4.31–10.16)

## 2024-10-04 PROCEDURE — 84153 ASSAY OF PSA TOTAL: CPT

## 2024-10-07 DIAGNOSIS — I10 ESSENTIAL (PRIMARY) HYPERTENSION: ICD-10-CM

## 2024-10-08 RX ORDER — METOPROLOL TARTRATE 25 MG/1
TABLET, FILM COATED ORAL
Qty: 90 TABLET | Refills: 1 | Status: SHIPPED | OUTPATIENT
Start: 2024-10-08

## 2024-10-14 ENCOUNTER — RA CDI HCC (OUTPATIENT)
Dept: OTHER | Facility: HOSPITAL | Age: 89
End: 2024-10-14

## 2024-10-18 ENCOUNTER — OFFICE VISIT (OUTPATIENT)
Dept: FAMILY MEDICINE CLINIC | Facility: CLINIC | Age: 89
End: 2024-10-18
Payer: MEDICARE

## 2024-10-18 VITALS
TEMPERATURE: 97.2 F | BODY MASS INDEX: 21.98 KG/M2 | HEART RATE: 62 BPM | SYSTOLIC BLOOD PRESSURE: 132 MMHG | HEIGHT: 70 IN | DIASTOLIC BLOOD PRESSURE: 80 MMHG | OXYGEN SATURATION: 98 % | WEIGHT: 153.5 LBS | RESPIRATION RATE: 16 BRPM

## 2024-10-18 DIAGNOSIS — C61 PROSTATE CANCER (HCC): ICD-10-CM

## 2024-10-18 DIAGNOSIS — E11.9 TYPE 2 DIABETES MELLITUS WITHOUT COMPLICATION, WITHOUT LONG-TERM CURRENT USE OF INSULIN (HCC): Primary | ICD-10-CM

## 2024-10-18 DIAGNOSIS — I25.10 CORONARY ARTERY DISEASE INVOLVING NATIVE CORONARY ARTERY OF NATIVE HEART WITHOUT ANGINA PECTORIS: ICD-10-CM

## 2024-10-18 DIAGNOSIS — Z23 ENCOUNTER FOR IMMUNIZATION: ICD-10-CM

## 2024-10-18 DIAGNOSIS — I49.5 SSS (SICK SINUS SYNDROME) (HCC): ICD-10-CM

## 2024-10-18 DIAGNOSIS — I10 PRIMARY HYPERTENSION: ICD-10-CM

## 2024-10-18 DIAGNOSIS — I34.0 NONRHEUMATIC MITRAL VALVE REGURGITATION: ICD-10-CM

## 2024-10-18 DIAGNOSIS — E78.00 PURE HYPERCHOLESTEROLEMIA: ICD-10-CM

## 2024-10-18 PROCEDURE — G0008 ADMIN INFLUENZA VIRUS VAC: HCPCS

## 2024-10-18 PROCEDURE — 99214 OFFICE O/P EST MOD 30 MIN: CPT

## 2024-10-18 PROCEDURE — 90662 IIV NO PRSV INCREASED AG IM: CPT

## 2024-10-18 NOTE — ASSESSMENT & PLAN NOTE
Prostate CA s/p XRT 2006. He is followed by Urology.  He had been receiving ADT injections for elevated PSA-last injection 12/2023      Lab Results   Component Value Date    PSA 0.085 10/04/2024    PSA 0.04 02/27/2024    PSA 0.05 12/01/2023           Orders:    PSA Total, Diagnostic; Future

## 2024-10-18 NOTE — PROGRESS NOTES
Ambulatory Visit  Name: Jhonny Knowles Sr.      : 1934      MRN: [id number]  Encounter Provider: Jorge Beckford MD  Encounter Date: 10/18/2024   Encounter department: [facility]    Assessment & Plan  Type 2 diabetes mellitus without complication, without long-term current use of insulin (HCC)    Type 2 diabetes mellitus. On Glimepiride 1 mg daily. 10/2024 FBS 99.  A1c 5.9. 2023  albumin less than 7.0 Not on ACE or ARB. No hypoglycemic events. No neuropathy symptoms. He is followed by Podiatry. Current with eye exam 2023 no diabetic retinopathy .     Lab Results   Component Value Date    HGBA1C 5.9 (H) 10/04/2024       Orders:    Hemoglobin A1C    Albumin / creatinine urine ratio; Future    Primary hypertension    Hypertension blood pressures have been stable on Metoprolol tartrate 50 mg in AM and 25 mg in PM. 2024 Creatinine 0.90. GFR 75.  Electrolytes normal.  Hgb 12.1.    Lab Results   Component Value Date     2015    SODIUM 142 10/04/2024    K 4.1 10/04/2024     10/04/2024    CO2 26 10/04/2024    ANIONGAP 6 2015    AGAP 10 10/04/2024    BUN 25 10/04/2024    CREATININE 0.90 10/04/2024    GLUC 197 (H) 04/15/2024    GLUF 99 10/04/2024    CALCIUM 9.4 10/04/2024    AST 23 10/04/2024    ALT 15 10/04/2024    ALKPHOS 57 10/04/2024    PROT 7.2 2015    TP 7.1 10/04/2024    BILITOT 0.87 2015    TBILI 0.56 10/04/2024    EGFR 75 10/04/2024     Lab Results   Component Value Date    WBC 4.99 10/04/2024    HGB 12.1 10/04/2024    HCT 38.2 10/04/2024    MCV 96 10/04/2024     10/04/2024       Orders:    CBC and differential    Comprehensive metabolic panel    Pure hypercholesterolemia    Hyperlipidemia and CAD on Atorvastatin 10 mg daily lipid profile  2024 TSH 1.648.     Lab Results   Component Value Date    CHOLESTEROL 162 10/04/2024    CHOLESTEROL 142 2024    CHOLESTEROL 145 2023     Lab Results   Component Value Date    HDL 44 10/04/2024     HDL 41 04/08/2024    HDL 47 11/28/2023     Lab Results   Component Value Date    TRIG 77 10/04/2024    TRIG 133 04/08/2024    TRIG 98 11/28/2023     Lab Results   Component Value Date    LDLCALC 103 (H) 10/04/2024        Orders:    Lipid panel    Coronary artery disease involving native coronary artery of native heart without angina pectoris    CAD s/p stents. S/p pacemaker.   02/2018.  Echocardiogram normal left ventricular systolic function.  EF 55%.  No regional wall motion abnormalities. Grade 1 diastolic dysfunction. mild mitral regurgitation.  Mild aortic regurgitation. Trace TR/ND.  No pericardial effusion.  Aortic root normal size.         Nonrheumatic mitral valve regurgitation         SSS (sick sinus syndrome) (HCC)         Prostate cancer (HCC)    Prostate CA s/p XRT 2006. He is followed by Urology.  He had been receiving ADT injections for elevated PSA-last injection 12/2023      Lab Results   Component Value Date    PSA 0.085 10/04/2024    PSA 0.04 02/27/2024    PSA 0.05 12/01/2023           Orders:    PSA Total, Diagnostic; Future    Encounter for immunization    Orders:    influenza vaccine, high-dose, PF 0.5 mL (Fluzone High Dose)      OV 6 months with labs        History of Present Illness       Follow up visit for chronic medical problems.  Medications reviewed     09/2024 ER visit for fall/head strike. No LOC. CT scan head No acute intracranial abnormality. Moderate chronic microangiopathic ischemic changes      04/2024 ER visit Injury to right chest wall/right shoulder/upper arm-patient tripped. He used his cane to prevent him from falling placing all his weight on his right arm. Chest x-ray showed no fracture or pneumothorax visualized.  CT scan chest Minimal biapical parenchymal scarring. No pleural or pericardial effusion or central airway lesion. Dual-chamber pacemaker. Mild to moderate vascular calcification. Coronary artery calcification or stent. No mediastinal  adenopathy or esophageal  "dilatation. Right renal scarring. Extensive spinal osteophytic change. No bony fractures demonstrated. Fusiform fatty mass deep to the left latissimus dorsi. There is asymmetric axillary soft tissues more prominent on the right.       12/2023 ER visit  Left hip/left leg pain started after he attempted to move a table in his garage.  Prior left total hip replacement 7 years ago.  X-rays of left hip show total left hip arthroplasty.  No acute fracture or dislocation.  Degenerative changes visualized lower lumbar spine.  He was seen in follow-up by orthopedic surgery.  X-rays of lumbar spine show severe DJD of lumbar spine.  Extensive facet arthropathy.  Multilevel degenerative disc disease most severe at L4-L5.  Grade 1 spondylolisthesis at this level. He is currently receiving PT. Walking is \"funny\"  ambulating with a cane          Review of Systems   Constitutional:  Negative for appetite change, chills, fever and unexpected weight change.   HENT:  Negative for congestion, ear pain, rhinorrhea, sore throat and trouble swallowing.    Eyes:  Negative for visual disturbance.   Respiratory:  Negative for cough, shortness of breath and wheezing.    Cardiovascular:  Negative for chest pain, palpitations and leg swelling.        See HPI    Gastrointestinal:  Negative for abdominal pain, blood in stool, constipation, diarrhea, nausea and vomiting.        Colonoscopy 04/2021   Endocrine: Negative for polydipsia and polyuria.   Genitourinary:  Negative for difficulty urinating.                                                           Musculoskeletal:  Positive for gait problem (ambulating with a cane). Negative for arthralgias and myalgias.        OA S/p left THR 01/2017.    Skin:  Negative for rash.        History of BCCs/SCCs followed by Dermatology   Allergic/Immunologic: Negative for environmental allergies.   Neurological:  Negative for dizziness and headaches.   Hematological:  Negative for adenopathy. Does not " bruise/bleed easily.        Lab Results       Component                Value               Date                       WBC                      4.99                10/04/2024                 HGB                      12.1                10/04/2024                 HCT                      38.2                10/04/2024                 MCV                      96                  10/04/2024                 PLT                      236                 10/04/2024         Lab Results       Component                Value               Date                       [id number]               544                 01/05/2023    Lab Results       Component                Value               Date                       FOLATE                   >20.0 (H)           01/05/2023            Lab Results       Component                Value               Date                       RETICCTPCT               1.62                01/05/2023            Lab Results       Component                Value               Date                       IRON                     76                  01/05/2023                 TIBC                     268                 01/05/2023                 FERRITIN                 115                 01/05/2023                                              Psychiatric/Behavioral:  Negative for dysphoric mood and sleep disturbance.      Past Medical History:   Diagnosis Date    Basal cell carcinoma     Benign polyp of large intestine     Cancer (HCC)     Diabetes mellitus (HCC) 2010    Osteoarthritis of left hip     last assessed 09Jan2017    Piriformis syndrome of left side     last assessed 18Oct2016     Past Surgical History:   Procedure Laterality Date    APPENDECTOMY  1966    CARDIAC PACEMAKER PLACEMENT  05/01/2005    CATARACT EXTRACTION Left 10/01/1995    CATARACT EXTRACTION Right 02/01/2000    CORONARY ANGIOPLASTY  01/01/2010 2010    CORONARY ANGIOPLASTY WITH STENT PLACEMENT  11/01/2002    EYE SURGERY  Cataract   2000 & 2002    INGUINAL HERNIA REPAIR Right 01/01/1998    INGUINAL HERNIA REPAIR Left 01/01/2009    2009    JOINT REPLACEMENT  2010 &  2017    LUMBAR LAMINECTOMY  01/01/2002    2002    REPLACEMENT TOTAL KNEE Right 06/01/2010    right total knee replacement with complications    RHIZOTOMY      TONSILLECTOMY AND ADENOIDECTOMY  01/01/1937    1937    TOTAL HIP ARTHROPLASTY Left 01/18/2017     Family History   Problem Relation Age of Onset    Coronary artery disease Mother     Diabetes Maternal Grandmother      Social History     Tobacco Use    Smoking status: Never    Smokeless tobacco: Never   Vaping Use    Vaping status: Never Used   Substance and Sexual Activity    Alcohol use: No    Drug use: Never    Sexual activity: Not Currently     Partners: Female     Birth control/protection: None     Current Outpatient Medications on File Prior to Visit   Medication Sig    aspirin 81 MG tablet Take 1 tablet by mouth daily Except [place]     atorvastatin (LIPITOR) 10 mg tablet TAKE 1 TABLET DAILY    cephalexin (KEFLEX) 500 mg capsule For dental visits    glimepiride (AMARYL) 1 mg tablet TAKE 1 TABLET DAILY    glucose blood (OneTouch Ultra) test strip Patient tests once daily.    Lancets (OneTouch Delica Plus [id number]) MISC Use 1 application. daily    metoprolol tartrate (LOPRESSOR) 25 mg tablet TAKE 1 TABLET EVERY EVENING (TAKING 50 MG TABLET IN THE MORNING AND 25 MG TABLET IN THE EVENING)    metoprolol tartrate (LOPRESSOR) 50 mg tablet TAKE 1 TABLET EVERY MORNING (TAKING BOTH A 50 MG TABLET IN THE MORNING AND A 25 MG TABLET IN THE EVENING)    Multiple Vitamins-Minerals (MULTIVITAMIN ADULT PO) Take 1 tablet by mouth every other day.    acetaminophen (TYLENOL) 325 mg tablet Take 650 mg by mouth every 6 (six) hours as needed for mild pain (Patient not taking: Reported on 2/5/2024)    leuprolide (Eligard) 22.5 mg Inject 22.5 mg under the skin every 3 (three) months (Patient not taking: Reported on 10/18/2024)     Allergies  "  Allergen Reactions    Penicillins Rash    Plavix [Clopidogrel] Rash     Immunization History   Administered Date(s) Administered    COVID-19 MODERNA VACC 0.5 ML IM 01/19/2021, 02/16/2021, 10/21/2021    COVID-19 Moderna Vac BIVALENT 12 Yr+ IM 0.5 ML 09/10/2022    COVID-19 Moderna mRNA Vaccine 12 Yr+ 50 mcg/0.5 mL (Spikevax) 05/01/2024    COVID-19 Moderna vac 6-11y or adult booster 50 mcg/0.5 mL 04/13/2022    INFLUENZA 09/26/2022    Influenza Split High Dose Preservative Free IM 12/04/2012, 12/13/2013, 10/01/2014, 09/24/2015, 11/09/2016, 11/02/2017    Influenza, high dose seasonal 0.7 mL 10/26/2018, 10/08/2019, 10/14/2020, 11/17/2021, 09/26/2022    Pneumococcal Conjugate 13-Valent 02/23/2016    Pneumococcal Polysaccharide PPV23 12/13/2011    Respiratory Syncytial Virus Vaccine (Recombinant, Adjuvanted) 05/01/2024    Zoster 05/30/2013    Zoster Vaccine Recombinant 06/12/2019, 08/15/2019     Objective       /80 (BP Location: Left arm, Patient Position: Sitting, Cuff Size: Standard)   Pulse 62   Temp (!) 97.2 °F (36.2 °C)   Resp 16   Ht 5' 10\" (1.778 m)   Wt 69.6 kg (153 lb 8 oz)   SpO2 98%   BMI 22.02 kg/m²     Wt Readings from Last 3 Encounters:   10/18/24 69.6 kg (153 lb 8 oz)   08/05/24 69.9 kg (154 lb 1.6 oz)   04/25/24 72.1 kg (159 lb)         Physical Exam  Constitutional:       General: He is not in acute distress.  HENT:      Head: Atraumatic.   Eyes:      General: No scleral icterus.     Extraocular Movements: Extraocular movements intact.      Conjunctiva/sclera: Conjunctivae normal.      Pupils: Pupils are equal, round, and reactive to light.   Neck:      Thyroid: No thyroid mass or thyromegaly.      Vascular: Normal carotid pulses. No carotid bruit or JVD.   Cardiovascular:      Rate and Rhythm: Normal rate and regular rhythm.      Heart sounds: No murmur heard.     No gallop.   Pulmonary:      Effort: Pulmonary effort is normal.      Breath sounds: Normal breath sounds. No wheezing or rales. "   Musculoskeletal:      Right lower leg: No edema.      Left lower leg: No edema.   Lymphadenopathy:      Cervical: No cervical adenopathy.      Upper Body:      Right upper body: No supraclavicular adenopathy.      Left upper body: No supraclavicular adenopathy.   Skin:     Findings: No rash.   Neurological:      General: No focal deficit present.      Mental Status: He is alert and oriented to person, place, and time.   Psychiatric:         Mood and Affect: Mood normal.

## 2024-10-18 NOTE — ASSESSMENT & PLAN NOTE
Hypertension blood pressures have been stable on Metoprolol tartrate 50 mg in AM and 25 mg in PM. 04/2024 Creatinine 0.90. GFR 75.  Electrolytes normal.  Hgb 12.1.    Lab Results   Component Value Date     12/22/2015    SODIUM 142 10/04/2024    K 4.1 10/04/2024     10/04/2024    CO2 26 10/04/2024    ANIONGAP 6 12/22/2015    AGAP 10 10/04/2024    BUN 25 10/04/2024    CREATININE 0.90 10/04/2024    GLUC 197 (H) 04/15/2024    GLUF 99 10/04/2024    CALCIUM 9.4 10/04/2024    AST 23 10/04/2024    ALT 15 10/04/2024    ALKPHOS 57 10/04/2024    PROT 7.2 12/22/2015    TP 7.1 10/04/2024    BILITOT 0.87 12/22/2015    TBILI 0.56 10/04/2024    EGFR 75 10/04/2024     Lab Results   Component Value Date    WBC 4.99 10/04/2024    HGB 12.1 10/04/2024    HCT 38.2 10/04/2024    MCV 96 10/04/2024     10/04/2024       Orders:    CBC and differential    Comprehensive metabolic panel

## 2024-10-18 NOTE — ASSESSMENT & PLAN NOTE
CAD s/p stents. S/p pacemaker.   02/2018.  Echocardiogram normal left ventricular systolic function.  EF 55%.  No regional wall motion abnormalities. Grade 1 diastolic dysfunction. mild mitral regurgitation.  Mild aortic regurgitation. Trace TR/NY.  No pericardial effusion.  Aortic root normal size.

## 2024-10-18 NOTE — ASSESSMENT & PLAN NOTE
Type 2 diabetes mellitus. On Glimepiride 1 mg daily. 10/2024 FBS 99.  A1c 5.9. 11/2023  albumin less than 7.0 Not on ACE or ARB. No hypoglycemic events. No neuropathy symptoms. He is followed by Podiatry. Current with eye exam 04/2023 no diabetic retinopathy .     Lab Results   Component Value Date    HGBA1C 5.9 (H) 10/04/2024       Orders:    Hemoglobin A1C    Albumin / creatinine urine ratio; Future

## 2024-10-18 NOTE — ASSESSMENT & PLAN NOTE
Hyperlipidemia and CAD on Atorvastatin 10 mg daily lipid profile  04/2024 TSH 1.648.     Lab Results   Component Value Date    CHOLESTEROL 162 10/04/2024    CHOLESTEROL 142 04/08/2024    CHOLESTEROL 145 11/28/2023     Lab Results   Component Value Date    HDL 44 10/04/2024    HDL 41 04/08/2024    HDL 47 11/28/2023     Lab Results   Component Value Date    TRIG 77 10/04/2024    TRIG 133 04/08/2024    TRIG 98 11/28/2023     Lab Results   Component Value Date    LDLCALC 103 (H) 10/04/2024        Orders:    Lipid panel

## 2024-11-12 ENCOUNTER — REMOTE DEVICE CLINIC VISIT (OUTPATIENT)
Dept: CARDIOLOGY CLINIC | Facility: CLINIC | Age: 89
End: 2024-11-12
Payer: MEDICARE

## 2024-11-12 DIAGNOSIS — Z95.0 CARDIAC PACEMAKER IN SITU: Primary | ICD-10-CM

## 2024-11-12 PROCEDURE — 93296 REM INTERROG EVL PM/IDS: CPT | Performed by: STUDENT IN AN ORGANIZED HEALTH CARE EDUCATION/TRAINING PROGRAM

## 2024-11-12 PROCEDURE — 93294 REM INTERROG EVL PM/LDLS PM: CPT | Performed by: STUDENT IN AN ORGANIZED HEALTH CARE EDUCATION/TRAINING PROGRAM

## 2024-11-12 NOTE — PROGRESS NOTES
"Results for orders placed or performed in visit on 11/12/24   Cardiac EP device report    Narrative    MDT DUAL PPM - NOT MRI CONDITIONAL  CARELINK TRANSMISSION: BATTERY STATUS \"3 YRS.\" AP 94%  100%. ALL AVAILABLE LEAD PARAMETERS WITHIN NORMAL LIMITS. NO SIGNIFICANT HIGH RATE EPISODES. NORMAL DEVICE FUNCTION. NC         "

## 2024-12-12 ENCOUNTER — HOSPITAL ENCOUNTER (INPATIENT)
Facility: HOSPITAL | Age: 89
LOS: 2 days | Discharge: HOME | DRG: 310 | End: 2024-12-14
Attending: EMERGENCY MEDICINE
Payer: MEDICARE

## 2024-12-12 ENCOUNTER — APPOINTMENT (EMERGENCY)
Dept: RADIOLOGY | Facility: HOSPITAL | Age: 89
DRG: 310 | End: 2024-12-12
Payer: MEDICARE

## 2024-12-12 ENCOUNTER — APPOINTMENT (INPATIENT)
Dept: NON INVASIVE DIAGNOSTICS | Facility: HOSPITAL | Age: 89
DRG: 310 | End: 2024-12-12
Payer: MEDICARE

## 2024-12-12 DIAGNOSIS — I48.91 NEW ONSET A-FIB (HCC): Primary | ICD-10-CM

## 2024-12-12 DIAGNOSIS — R55 SYNCOPE: ICD-10-CM

## 2024-12-12 DIAGNOSIS — I48.91 ATRIAL FIBRILLATION WITH RAPID VENTRICULAR RESPONSE (HCC): ICD-10-CM

## 2024-12-12 DIAGNOSIS — R06.00 DYSPNEA: ICD-10-CM

## 2024-12-12 DIAGNOSIS — I49.3 FREQUENT PVCS: ICD-10-CM

## 2024-12-12 PROBLEM — R79.89 ELEVATED TROPONIN LEVEL NOT DUE TO ACUTE CORONARY SYNDROME: Status: ACTIVE | Noted: 2024-12-12

## 2024-12-12 LAB
2HR DELTA HS TROPONIN: 101 NG/L
4HR DELTA HS TROPONIN: 165 NG/L
ALBUMIN SERPL BCG-MCNC: 4 G/DL (ref 3.5–5)
ALP SERPL-CCNC: 53 U/L (ref 34–104)
ALT SERPL W P-5'-P-CCNC: 21 U/L (ref 7–52)
ANION GAP SERPL CALCULATED.3IONS-SCNC: 8 MMOL/L (ref 4–13)
APTT PPP: 26 SECONDS (ref 23–34)
APTT PPP: 50 SECONDS (ref 23–34)
AST SERPL W P-5'-P-CCNC: 30 U/L (ref 13–39)
BACTERIA UR QL AUTO: ABNORMAL /HPF
BASE EXCESS BLDA CALC-SCNC: -1 MMOL/L (ref -2–3)
BASOPHILS # BLD AUTO: 0.03 THOUSANDS/ÂΜL (ref 0–0.1)
BASOPHILS NFR BLD AUTO: 0 % (ref 0–1)
BILIRUB SERPL-MCNC: 0.62 MG/DL (ref 0.2–1)
BILIRUB UR QL STRIP: NEGATIVE
BNP SERPL-MCNC: 674 PG/ML (ref 0–100)
BUN SERPL-MCNC: 27 MG/DL (ref 5–25)
CA-I BLD-SCNC: 1.12 MMOL/L (ref 1.12–1.32)
CA-I BLD-SCNC: 1.22 MMOL/L (ref 1.12–1.32)
CALCIUM SERPL-MCNC: 9.1 MG/DL (ref 8.4–10.2)
CARDIAC TROPONIN I PNL SERPL HS: 163 NG/L (ref ?–50)
CARDIAC TROPONIN I PNL SERPL HS: 227 NG/L (ref ?–50)
CARDIAC TROPONIN I PNL SERPL HS: 62 NG/L (ref ?–50)
CHLORIDE SERPL-SCNC: 105 MMOL/L (ref 96–108)
CLARITY UR: CLEAR
CO2 SERPL-SCNC: 24 MMOL/L (ref 21–32)
COLOR UR: YELLOW
CREAT SERPL-MCNC: 1.04 MG/DL (ref 0.6–1.3)
EOSINOPHIL # BLD AUTO: 0.12 THOUSAND/ÂΜL (ref 0–0.61)
EOSINOPHIL NFR BLD AUTO: 2 % (ref 0–6)
ERYTHROCYTE [DISTWIDTH] IN BLOOD BY AUTOMATED COUNT: 13.3 % (ref 11.6–15.1)
GFR SERPL CREATININE-BSD FRML MDRD: 62 ML/MIN/1.73SQ M
GLUCOSE SERPL-MCNC: 179 MG/DL (ref 65–140)
GLUCOSE SERPL-MCNC: 245 MG/DL (ref 65–140)
GLUCOSE SERPL-MCNC: 267 MG/DL (ref 65–140)
GLUCOSE SERPL-MCNC: 270 MG/DL (ref 65–140)
GLUCOSE UR STRIP-MCNC: ABNORMAL MG/DL
HCO3 BLDA-SCNC: 23.9 MMOL/L (ref 24–30)
HCT VFR BLD AUTO: 36.5 % (ref 36.5–49.3)
HCT VFR BLD CALC: 34 % (ref 36.5–49.3)
HGB BLD-MCNC: 11.9 G/DL (ref 12–17)
HGB BLDA-MCNC: 11.6 G/DL (ref 12–17)
HGB UR QL STRIP.AUTO: NEGATIVE
HYALINE CASTS #/AREA URNS LPF: ABNORMAL /LPF
IMM GRANULOCYTES # BLD AUTO: 0.03 THOUSAND/UL (ref 0–0.2)
IMM GRANULOCYTES NFR BLD AUTO: 0 % (ref 0–2)
INR PPP: 1.08 (ref 0.85–1.19)
KETONES UR STRIP-MCNC: ABNORMAL MG/DL
LACTATE SERPL-SCNC: 1.4 MMOL/L (ref 0.5–2)
LACTATE SERPL-SCNC: 3 MMOL/L (ref 0.5–2)
LEUKOCYTE ESTERASE UR QL STRIP: NEGATIVE
LYMPHOCYTES # BLD AUTO: 1.22 THOUSANDS/ÂΜL (ref 0.6–4.47)
LYMPHOCYTES NFR BLD AUTO: 16 % (ref 14–44)
MAGNESIUM SERPL-MCNC: 2.1 MG/DL (ref 1.9–2.7)
MAGNESIUM SERPL-MCNC: 2.7 MG/DL (ref 1.9–2.7)
MCH RBC QN AUTO: 31.1 PG (ref 26.8–34.3)
MCHC RBC AUTO-ENTMCNC: 32.6 G/DL (ref 31.4–37.4)
MCV RBC AUTO: 95 FL (ref 82–98)
MONOCYTES # BLD AUTO: 0.59 THOUSAND/ÂΜL (ref 0.17–1.22)
MONOCYTES NFR BLD AUTO: 8 % (ref 4–12)
MUCOUS THREADS UR QL AUTO: ABNORMAL
NEUTROPHILS # BLD AUTO: 5.48 THOUSANDS/ÂΜL (ref 1.85–7.62)
NEUTS SEG NFR BLD AUTO: 74 % (ref 43–75)
NITRITE UR QL STRIP: NEGATIVE
NON-SQ EPI CELLS URNS QL MICRO: ABNORMAL /HPF
NRBC BLD AUTO-RTO: 0 /100 WBCS
PCO2 BLD: 25 MMOL/L (ref 21–32)
PCO2 BLD: 39.9 MM HG (ref 42–50)
PH BLD: 7.39 [PH] (ref 7.3–7.4)
PH UR STRIP.AUTO: 5 [PH]
PLATELET # BLD AUTO: 272 THOUSANDS/UL (ref 149–390)
PMV BLD AUTO: 10.4 FL (ref 8.9–12.7)
PO2 BLD: 42 MM HG (ref 35–45)
POTASSIUM BLD-SCNC: 4.5 MMOL/L (ref 3.5–5.3)
POTASSIUM SERPL-SCNC: 4.3 MMOL/L (ref 3.5–5.3)
PROCALCITONIN SERPL-MCNC: <0.05 NG/ML
PROT SERPL-MCNC: 6.7 G/DL (ref 6.4–8.4)
PROT UR STRIP-MCNC: ABNORMAL MG/DL
PROTHROMBIN TIME: 14.7 SECONDS (ref 12.3–15)
RBC # BLD AUTO: 3.83 MILLION/UL (ref 3.88–5.62)
RBC #/AREA URNS AUTO: ABNORMAL /HPF
SAO2 % BLD FROM PO2: 77 % (ref 60–85)
SODIUM BLD-SCNC: 139 MMOL/L (ref 136–145)
SODIUM SERPL-SCNC: 137 MMOL/L (ref 135–147)
SP GR UR STRIP.AUTO: 1.03 (ref 1–1.03)
SPECIMEN SOURCE: ABNORMAL
TSH SERPL DL<=0.05 MIU/L-ACNC: 1.2 UIU/ML (ref 0.45–4.5)
UROBILINOGEN UR STRIP-ACNC: <2 MG/DL
WBC # BLD AUTO: 7.47 THOUSAND/UL (ref 4.31–10.16)
WBC #/AREA URNS AUTO: ABNORMAL /HPF

## 2024-12-12 PROCEDURE — 84145 PROCALCITONIN (PCT): CPT | Performed by: EMERGENCY MEDICINE

## 2024-12-12 PROCEDURE — 83735 ASSAY OF MAGNESIUM: CPT | Performed by: EMERGENCY MEDICINE

## 2024-12-12 PROCEDURE — 84132 ASSAY OF SERUM POTASSIUM: CPT

## 2024-12-12 PROCEDURE — 85610 PROTHROMBIN TIME: CPT | Performed by: EMERGENCY MEDICINE

## 2024-12-12 PROCEDURE — 99223 1ST HOSP IP/OBS HIGH 75: CPT | Performed by: PHYSICIAN ASSISTANT

## 2024-12-12 PROCEDURE — 85730 THROMBOPLASTIN TIME PARTIAL: CPT | Performed by: EMERGENCY MEDICINE

## 2024-12-12 PROCEDURE — 83735 ASSAY OF MAGNESIUM: CPT | Performed by: NURSE PRACTITIONER

## 2024-12-12 PROCEDURE — 85025 COMPLETE CBC W/AUTO DIFF WBC: CPT | Performed by: EMERGENCY MEDICINE

## 2024-12-12 PROCEDURE — 84484 ASSAY OF TROPONIN QUANT: CPT | Performed by: EMERGENCY MEDICINE

## 2024-12-12 PROCEDURE — 82947 ASSAY GLUCOSE BLOOD QUANT: CPT

## 2024-12-12 PROCEDURE — 36415 COLL VENOUS BLD VENIPUNCTURE: CPT | Performed by: EMERGENCY MEDICINE

## 2024-12-12 PROCEDURE — 80053 COMPREHEN METABOLIC PANEL: CPT | Performed by: EMERGENCY MEDICINE

## 2024-12-12 PROCEDURE — 82330 ASSAY OF CALCIUM: CPT

## 2024-12-12 PROCEDURE — 82948 REAGENT STRIP/BLOOD GLUCOSE: CPT

## 2024-12-12 PROCEDURE — 84295 ASSAY OF SERUM SODIUM: CPT

## 2024-12-12 PROCEDURE — 84484 ASSAY OF TROPONIN QUANT: CPT | Performed by: PHYSICIAN ASSISTANT

## 2024-12-12 PROCEDURE — 83605 ASSAY OF LACTIC ACID: CPT | Performed by: EMERGENCY MEDICINE

## 2024-12-12 PROCEDURE — 71045 X-RAY EXAM CHEST 1 VIEW: CPT

## 2024-12-12 PROCEDURE — 99285 EMERGENCY DEPT VISIT HI MDM: CPT

## 2024-12-12 PROCEDURE — 81001 URINALYSIS AUTO W/SCOPE: CPT | Performed by: PHYSICIAN ASSISTANT

## 2024-12-12 PROCEDURE — 93005 ELECTROCARDIOGRAM TRACING: CPT

## 2024-12-12 PROCEDURE — 99291 CRITICAL CARE FIRST HOUR: CPT | Performed by: EMERGENCY MEDICINE

## 2024-12-12 PROCEDURE — 96365 THER/PROPH/DIAG IV INF INIT: CPT

## 2024-12-12 PROCEDURE — 85014 HEMATOCRIT: CPT

## 2024-12-12 PROCEDURE — 84443 ASSAY THYROID STIM HORMONE: CPT | Performed by: PHYSICIAN ASSISTANT

## 2024-12-12 PROCEDURE — 87154 CUL TYP ID BLD PTHGN 6+ TRGT: CPT | Performed by: EMERGENCY MEDICINE

## 2024-12-12 PROCEDURE — 82330 ASSAY OF CALCIUM: CPT | Performed by: EMERGENCY MEDICINE

## 2024-12-12 PROCEDURE — 82803 BLOOD GASES ANY COMBINATION: CPT

## 2024-12-12 PROCEDURE — 85730 THROMBOPLASTIN TIME PARTIAL: CPT | Performed by: PHYSICIAN ASSISTANT

## 2024-12-12 PROCEDURE — 96375 TX/PRO/DX INJ NEW DRUG ADDON: CPT

## 2024-12-12 PROCEDURE — 83880 ASSAY OF NATRIURETIC PEPTIDE: CPT | Performed by: EMERGENCY MEDICINE

## 2024-12-12 PROCEDURE — 87040 BLOOD CULTURE FOR BACTERIA: CPT | Performed by: EMERGENCY MEDICINE

## 2024-12-12 PROCEDURE — 99223 1ST HOSP IP/OBS HIGH 75: CPT | Performed by: INTERNAL MEDICINE

## 2024-12-12 RX ORDER — INSULIN LISPRO 100 [IU]/ML
1-5 INJECTION, SOLUTION INTRAVENOUS; SUBCUTANEOUS
Status: DISCONTINUED | OUTPATIENT
Start: 2024-12-12 | End: 2024-12-14 | Stop reason: HOSPADM

## 2024-12-12 RX ORDER — MAGNESIUM SULFATE HEPTAHYDRATE 40 MG/ML
2 INJECTION, SOLUTION INTRAVENOUS ONCE
Status: COMPLETED | OUTPATIENT
Start: 2024-12-12 | End: 2024-12-12

## 2024-12-12 RX ORDER — HEPARIN SODIUM 10000 [USP'U]/100ML
3-20 INJECTION, SOLUTION INTRAVENOUS
Status: DISCONTINUED | OUTPATIENT
Start: 2024-12-12 | End: 2024-12-12

## 2024-12-12 RX ORDER — ACETAMINOPHEN 325 MG/1
650 TABLET ORAL EVERY 6 HOURS PRN
Status: DISCONTINUED | OUTPATIENT
Start: 2024-12-12 | End: 2024-12-14 | Stop reason: HOSPADM

## 2024-12-12 RX ORDER — ONDANSETRON 2 MG/ML
4 INJECTION INTRAMUSCULAR; INTRAVENOUS ONCE
Status: COMPLETED | OUTPATIENT
Start: 2024-12-12 | End: 2024-12-12

## 2024-12-12 RX ORDER — ONDANSETRON 2 MG/ML
4 INJECTION INTRAMUSCULAR; INTRAVENOUS EVERY 6 HOURS PRN
Status: DISCONTINUED | OUTPATIENT
Start: 2024-12-12 | End: 2024-12-14 | Stop reason: HOSPADM

## 2024-12-12 RX ORDER — ASPIRIN 81 MG/1
81 TABLET, CHEWABLE ORAL DAILY
Status: DISCONTINUED | OUTPATIENT
Start: 2024-12-12 | End: 2024-12-12

## 2024-12-12 RX ORDER — LIDOCAINE HCL 20 MG/ML
1 SYRINGE (ML) INTRAVENOUS ONCE
Status: COMPLETED | OUTPATIENT
Start: 2024-12-12 | End: 2024-12-12

## 2024-12-12 RX ORDER — ATORVASTATIN CALCIUM 10 MG/1
10 TABLET, FILM COATED ORAL
Status: DISCONTINUED | OUTPATIENT
Start: 2024-12-12 | End: 2024-12-14 | Stop reason: HOSPADM

## 2024-12-12 RX ORDER — METOPROLOL TARTRATE 25 MG/1
25 TABLET, FILM COATED ORAL ONCE
Status: COMPLETED | OUTPATIENT
Start: 2024-12-12 | End: 2024-12-12

## 2024-12-12 RX ORDER — METOPROLOL TARTRATE 50 MG
50 TABLET ORAL EVERY 12 HOURS SCHEDULED
Status: DISCONTINUED | OUTPATIENT
Start: 2024-12-12 | End: 2024-12-14 | Stop reason: HOSPADM

## 2024-12-12 RX ORDER — METOPROLOL TARTRATE 1 MG/ML
5 INJECTION, SOLUTION INTRAVENOUS EVERY 6 HOURS PRN
Status: DISCONTINUED | OUTPATIENT
Start: 2024-12-12 | End: 2024-12-14 | Stop reason: HOSPADM

## 2024-12-12 RX ORDER — METOPROLOL TARTRATE 1 MG/ML
5 INJECTION, SOLUTION INTRAVENOUS EVERY 6 HOURS PRN
Status: DISCONTINUED | OUTPATIENT
Start: 2024-12-12 | End: 2024-12-12

## 2024-12-12 RX ADMIN — ATORVASTATIN CALCIUM 10 MG: 10 TABLET, FILM COATED ORAL at 17:46

## 2024-12-12 RX ADMIN — APIXABAN 5 MG: 5 TABLET, FILM COATED ORAL at 22:49

## 2024-12-12 RX ADMIN — SODIUM CHLORIDE 250 ML: 0.9 INJECTION, SOLUTION INTRAVENOUS at 11:50

## 2024-12-12 RX ADMIN — DEXTROSE 150 MG: 50 INJECTION, SOLUTION INTRAVENOUS at 20:53

## 2024-12-12 RX ADMIN — AMIODARONE HYDROCHLORIDE 1 MG/MIN: 50 INJECTION, SOLUTION INTRAVENOUS at 21:14

## 2024-12-12 RX ADMIN — METOPROLOL TARTRATE 25 MG: 25 TABLET, FILM COATED ORAL at 13:49

## 2024-12-12 RX ADMIN — DEXTROSE 75 MG: 50 INJECTION, SOLUTION INTRAVENOUS at 11:55

## 2024-12-12 RX ADMIN — ONDANSETRON 4 MG: 2 INJECTION, SOLUTION INTRAMUSCULAR; INTRAVENOUS at 11:44

## 2024-12-12 RX ADMIN — INSULIN LISPRO 2 UNITS: 100 INJECTION, SOLUTION INTRAVENOUS; SUBCUTANEOUS at 17:46

## 2024-12-12 RX ADMIN — MAGNESIUM SULFATE HEPTAHYDRATE 2 G: 40 INJECTION, SOLUTION INTRAVENOUS at 11:45

## 2024-12-12 RX ADMIN — INSULIN LISPRO 1 UNITS: 100 INJECTION, SOLUTION INTRAVENOUS; SUBCUTANEOUS at 22:49

## 2024-12-12 RX ADMIN — ASPIRIN 81 MG 81 MG: 81 TABLET ORAL at 15:01

## 2024-12-12 RX ADMIN — HEPARIN SODIUM 12 UNITS/KG/HR: 10000 INJECTION, SOLUTION INTRAVENOUS at 12:09

## 2024-12-12 RX ADMIN — METOROPROLOL TARTRATE 5 MG: 5 INJECTION, SOLUTION INTRAVENOUS at 14:55

## 2024-12-12 NOTE — QUICK NOTE
150s-170s however when you get an EKG we got 137, 105.  Discussed with cardiology and I agreed that patient has frequent PVCs which is throwing off the telemetry machine.  Patient currently asymptomatic.  If in doubt patient becomes symptomatic advised to get an EKG  Now continue to monitor.  Continue scheduled beta-blockers.

## 2024-12-12 NOTE — ASSESSMENT & PLAN NOTE
Lab Results   Component Value Date    HGBA1C 5.9 (H) 10/04/2024       Recent Labs     12/12/24  1159   POCGLU 245*       Blood Sugar Average: Last 72 hrs:  (P) 245  Recent A1c acceptable. Hyperglycemic on admission   Hold Curtis BOUDREAUX   QID Accuchecks

## 2024-12-12 NOTE — ASSESSMENT & PLAN NOTE
Noted at 62, did not have chest pain throughout entire event. Suspect due to tachy-arrhythmia   Trend   Patient is on heparin drip for A. Fib, not ACS

## 2024-12-12 NOTE — CONSULTS
Cardiology   Jhonny Knowles . 89 y.o. male MRN: [id number]  Unit/Bed#: ED-27 Encounter: [id number]      Reason for Consult / Principal Problem: Atrial fibrillation w/ RVR    Physician Requesting Consult:  Sam Sorto MD    Outpatient Cardiologist: Dr. العراقي    Assessment  1. Symptomatic atrial fibrillation with RVR.   -Presents to the [facility] ED today with acute onset of dizziness w/ subsequent loss of consciousness  -ECG in the ED demonstrated rapid atrial fibrillation, heart rate 140 bpm.  -Outpatient rate/rhythm control; none.  -Inpatient rate/rhythm control; S/p IV amiodarone bolus (150 mg x 1)  -S/p IVF bolus ( ml x 1)  -Electrolytes -K+ 4.3, calcium 9.1, mag level ordered pending.  -TSH level - pending.   -[id number] score = 5 (age, hypertension, vascular disease, and DM) -he was started on IV heparin GTT in the ED for systemic AC.  2. CAD, hx of PCI/stenting to the LAD in 11/2002  -No recent ischemic testing for review.  -On aspirin-low-dose, statin, and BB.  3. MDT DC PPM in situ  -Device interrogation 11/12/2024; battery status 3 years, AP 94%,  100%, all lead parameters within normal limits, no significant high rate episodes, normal device function.  4. Hypertension  -BP stable last recorded 127/58.  -Outpatient BP regimen; metoprolol tartrate 50 mg in the morning and 25 mg in the evening.  -Inpatient BP regimen; none  5. Dyslipidemia  -Lipid profile 10/24; cholesterol 162, triglycerides 77, HDL 44 and LDL calculated 103.  -On atorvastatin 10 mg daily.  6. DM type II  -HgbA1c 5.9.    Plan  -Pt denies any specific cardiac or respiratory complaints at this time. He is quite fatigued at this time. No recent anginal/anginal equivalent symptoms - he is quite active at baseline.   -BP currently stable.   -ECG/telemetry reviewed - on arrival to the ED was in afib w/RVR rates 140-160 bpm. Converted back to an AV paced rhythm shortly after receiving IV amiodarone bolus (only about 70 mg).  Previously taking metoprolol tartrate 50 mg in the a.m and 25 mg in the p.m (took his dose of metoprolol earlier this morning); -Give an extra dose of metoprolol tartrate 25 mg now and increase metoprolol tartrate to 50 mg BID.  -Agree w/IVFs for hydration- lab work (slightly elevated BUN/creat above baseline) and exam suggestive of dehydration; glucose level also 270.   -Interrogate PPM device to assess afib onset/burden.   -On IV heparin for now, can DC and start Eliquis 5 mg BID, DC aspirin.   -Obtain updated TTE  -Obtain updated TSH/free t4 level  -Monitor on telemetry.     HPI: Jhonny Bassgs Sr. 89 y.o. year old male with a medical history of CAD, PCI/stenting to the LAD in 11/2002, MDT DC PPM in situ, hypertension, dyslipidemia, and DM type II.  Lifelong non-smoker, denies excessive alcohol or recreational/illicit drug use.  He follows with Dr. العراقي with the  [facility] last seen as an outpatient back in August 2024.  At this time was reported to him doing well from a cardiac perspective.  No medication changes were made at this time.  Presents to the [facility] campus today after experiencing acute onset of dizziness, SOB, and left arm tingling sensation. EMS was summoned and transported the patient to the ED for further evaluation. Per report he was tachycardic on cardiac monitoring in route to the ED. Apparently, the patient was minimally responsive in route and upon arrival to the ED but gradually regained consciousness. He is currently drowsy but answering questions appropriately/following commands. His initial 12 lead ECG demonstrated rapid atrial fibrillation, heart rate 140 bpm.  In the ED he received IV Amio bolus 150 mg x 1 in addition to bolus for hydration.  Cardiology has been consulted for further treatment recommendation/management.    Family History:   Family History   Problem Relation Age of Onset    Coronary artery disease Mother     Diabetes Maternal Grandmother      Historical Information    Past Medical History:   Diagnosis Date    Basal cell carcinoma     Benign polyp of large intestine     Cancer (HCC)     Diabetes mellitus (HCC) 2010    Osteoarthritis of left hip     last assessed 09Jan2017    Piriformis syndrome of left side     last assessed 18Oct2016     Past Surgical History:   Procedure Laterality Date    APPENDECTOMY  1966    CARDIAC PACEMAKER PLACEMENT  05/01/2005    CATARACT EXTRACTION Left 10/01/1995    CATARACT EXTRACTION Right 02/01/2000    CORONARY ANGIOPLASTY  01/01/2010 2010    CORONARY ANGIOPLASTY WITH STENT PLACEMENT  11/01/2002    EYE SURGERY  Cataract  2000 & 2002    INGUINAL HERNIA REPAIR Right 01/01/1998    INGUINAL HERNIA REPAIR Left 01/01/2009    2009    JOINT REPLACEMENT  2010 &  2017    LUMBAR LAMINECTOMY  01/01/2002    2002    REPLACEMENT TOTAL KNEE Right 06/01/2010    right total knee replacement with complications    RHIZOTOMY      TONSILLECTOMY AND ADENOIDECTOMY  01/01/1937 1937    TOTAL HIP ARTHROPLASTY Left 01/18/2017     Social History   Social History     Substance and Sexual Activity   Alcohol Use No     Social History     Substance and Sexual Activity   Drug Use Never     Social History     Tobacco Use   Smoking Status Never   Smokeless Tobacco Never     Family History:   Family History   Problem Relation Age of Onset    Coronary artery disease Mother     Diabetes Maternal Grandmother        Review of Systems:  Review of Systems   Constitutional:  Positive for fatigue. Negative for chills and fever.   Eyes:  Negative for visual disturbance.   Respiratory:  Negative for cough, chest tightness and shortness of breath.    Cardiovascular:  Negative for chest pain, palpitations and leg swelling.   Gastrointestinal:  Negative for abdominal pain.   Neurological:  Negative for dizziness, light-headedness and headaches.     Scheduled Meds:  Current Facility-Administered Medications   Medication Dose Route Frequency Provider Last Rate    amiodarone (CORDARONE) 900 mg  in dextrose 5 % 500 mL infusion  1 mg/min Intravenous Continuous Sam Sorto MD      Followed by    amiodarone (CORDARONE) 900 mg in dextrose 5 % 500 mL infusion  0.5 mg/min Intravenous Continuous Sam Sorto MD      amiodarone 150 mg in dextrose 5 % 100 mL IV bolus  150 mg Intravenous Once Sam Sorto  mg (12/12/24 1155)    heparin (porcine)  3-20 Units/kg/hr (Order-Specific) Intravenous Titrated Sam Sorto MD      lidocaine (cardiac) (FOR EMS ONLY)  1 each Does not apply Once Sma Sotro MD      magnesium sulfate  2 g Intravenous Once Sam Sorto MD 2 g (12/12/24 1145)    sodium chloride  250 mL Intravenous Once Sam Sorto  mL (12/12/24 1150)     Continuous Infusions:amiodarone (CORDARONE) 900 mg in dextrose 5 % 500 mL infusion, 1 mg/min   Followed by  amiodarone (CORDARONE) 900 mg in dextrose 5 % 500 mL infusion, 0.5 mg/min  heparin (porcine), 3-20 Units/kg/hr (Order-Specific)      PRN Meds:.  all current active meds have been reviewed and current meds:   Current Facility-Administered Medications:     amiodarone (CORDARONE) 900 mg in dextrose 5 % 500 mL infusion, Continuous **FOLLOWED BY** amiodarone (CORDARONE) 900 mg in dextrose 5 % 500 mL infusion, Continuous    heparin (porcine) 25,000 units in 0.45% NaCl 250 mL infusion (premix), Titrated, Last Rate: 12 Units/kg/hr (12/12/24 1209)    lidocaine (cardiac) (FOR EMS ONLY) 20 mg/mL injection 100 mg, Once    magnesium sulfate 2 g/50 mL IVPB (premix) 2 g, Once, Last Rate: 2 g (12/12/24 1145)    Allergies   Allergen Reactions    Penicillins Rash    Plavix [Clopidogrel] Rash       Objective   Vitals: There were no vitals taken for this visit., There is no height or weight on file to calculate BMI.,     No intake or output data in the 24 hours ending 12/12/24 1201    Invasive Devices       Peripheral Intravenous Line  Duration             Peripheral IV 12/12/24 Dorsal (posterior);Right  Forearm <1 day    Peripheral IV 12/12/24 Left Antecubital <1 day    Peripheral IV 12/12/24 Left;Ventral (anterior) Forearm <1 day                    Physical Exam:  Physical Exam  Vitals and nursing note reviewed.   Constitutional:       General: He is not in acute distress.     Appearance: He is not diaphoretic.      Comments: Drowsy   HENT:      Head: Normocephalic and atraumatic.   Eyes:      General: No scleral icterus.  Cardiovascular:      Rate and Rhythm: Normal rate.      Pulses: Normal pulses.      Heart sounds: Normal heart sounds.      Comments: AV paced.   Pulmonary:      Effort: Pulmonary effort is normal.      Breath sounds: No wheezing or rales.   Abdominal:      Palpations: Abdomen is soft.      Tenderness: There is no abdominal tenderness.   Musculoskeletal:      Right lower leg: No edema.      Left lower leg: No edema.   Skin:     General: Skin is dry.      Coloration: Skin is pale.   Neurological:      Mental Status: He is oriented to person, place, and time.   Psychiatric:         Mood and Affect: Mood normal.         Lab Results:   Recent Results (from the past 24 hours)   ECG 12 lead    Collection Time: 12/12/24 11:30 AM   Result Value Ref Range    Ventricular Rate 140 BPM    Atrial Rate 68 BPM    NE Interval 248 ms    QRSD Interval 118 ms    QT Interval 322 ms    QTC Interval 491 ms    P Axis  degrees    QRS Axis -20 degrees    T Wave Axis 177 degrees   ECG 12 lead    Collection Time: 12/12/24 11:34 AM   Result Value Ref Range    Ventricular Rate 126 BPM    Atrial Rate 60 BPM    NE Interval 224 ms    QRSD Interval 118 ms    QT Interval 322 ms    QTC Interval 466 ms    P Axis 84 degrees    QRS Axis -26 degrees    T Wave Axis 184 degrees   POCT Blood Gas (CG8+)    Collection Time: 12/12/24 11:41 AM   Result Value Ref Range    ph, Gerard ISTAT 7.386 7.300 - 7.400    pCO2, Gerard i-STAT 39.9 (L) 42.0 - 50.0 mm HG    pO2, Gerard i-STAT 42.0 35.0 - 45.0 mm HG    BE, i-STAT -1 -2 - 3 mmol/L    HCO3, Gerard  i-STAT 23.9 (L) 24.0 - 30.0 mmol/L    CO2, i-STAT 25 21 - 32 mmol/L    O2 Sat, i-STAT 77 60 - 85 %    SODIUM, I-STAT 139 136 - 145 mmol/l    Potassium, i-STAT 4.5 3.5 - 5.3 mmol/L    Calcium, Ionized i-STAT 1.22 1.12 - 1.32 mmol/L    Hct, i-STAT 34 (L) 36.5 - 49.3 %    Hgb, i-STAT 11.6 (L) 12.0 - 17.0 g/dl    Glucose, i-STAT 270 (H) 65 - 140 mg/dl    Specimen Type VENOUS    CBC and differential    Collection Time: 12/12/24 11:42 AM   Result Value Ref Range    WBC 7.47 4.31 - 10.16 Thousand/uL    RBC 3.83 (L) 3.88 - 5.62 Million/uL    Hemoglobin 11.9 (L) 12.0 - 17.0 g/dL    Hematocrit 36.5 36.5 - 49.3 %    MCV 95 82 - 98 fL    MCH 31.1 26.8 - 34.3 pg    MCHC 32.6 31.4 - 37.4 g/dL    RDW 13.3 11.6 - 15.1 %    MPV 10.4 8.9 - 12.7 fL    Platelets 272 149 - 390 Thousands/uL    nRBC 0 /100 WBCs    Segmented % 74 43 - 75 %    Immature Grans % 0 0 - 2 %    Lymphocytes % 16 14 - 44 %    Monocytes % 8 4 - 12 %    Eosinophils Relative 2 0 - 6 %    Basophils Relative 0 0 - 1 %    Absolute Neutrophils 5.48 1.85 - 7.62 Thousands/µL    Absolute Immature Grans 0.03 0.00 - 0.20 Thousand/uL    Absolute Lymphocytes 1.22 0.60 - 4.47 Thousands/µL    Absolute Monocytes 0.59 0.17 - 1.22 Thousand/µL    Eosinophils Absolute 0.12 0.00 - 0.61 Thousand/µL    Basophils Absolute 0.03 0.00 - 0.10 Thousands/µL   ECG 12 lead    Collection Time: 12/12/24 11:51 AM   Result Value Ref Range    Ventricular Rate 89 BPM    Atrial Rate 56 BPM    PA Interval  ms    QRSD Interval 104 ms    QT Interval 394 ms    QTC Interval 479 ms    P Axis  degrees    QRS Axis -23 degrees    T Wave Axis 215 degrees   Fingerstick Glucose (POCT)    Collection Time: 12/12/24 11:59 AM   Result Value Ref Range    POC Glucose 245 (H) 65 - 140 mg/dl       * Please Note: Fluency DirectDictation voice to text software may have been used in the creation of this document. **

## 2024-12-12 NOTE — ASSESSMENT & PLAN NOTE
POA, new onset. Per pacemaker interrogation he has had about 25 minutes total of A. Fib burden since last evening around 2330  Status post Amiodarone bolus in the ED with conversion   K, Mag, Ca appropriate   Not volume overloaded   TSH pending   Admit patient to med/surg under inpatient status  Cardiology consult   Increase metoprolol to 50 mg BID  Extra 25 mg ordered for now   Follow up TSH   Trend K, Mag   Check ECHO  Heparin drip for now  May keep off A/C per discussion with Cardiology, however further decisions pending

## 2024-12-12 NOTE — ED NOTES
"Manual pulse taken. HR 40 as assessed by Iman HOROWITZ and Mikayla HOROWITZ. Patient asymptomatic - denies CP/SOB/dizziness and states he \"feels fine\". MD Sharma aware and aware of monitor rate of 140s. ECGs taken and given to MD/Cardiac. To monitor Patient at this time as per MD Sharma. Patient denies complaints at this time. Educated to call nurse with any new symptoms/CP.      Iman Gabriel RN  12/12/24 [id number]    "

## 2024-12-12 NOTE — H&P
H&P - Hospitalist   Name: Jhonny Knowles Sr. 89 y.o. male I MRN: [id number]  Unit/Bed#: ED-27 I Date of Admission: 12/12/2024   Date of Service: 12/12/2024 I Hospital Day: 0     Assessment & Plan  Atrial fibrillation with RVR (HCC)  POA, new onset. Per pacemaker interrogation he has had about 25 minutes total of A. Fib burden since last evening around 2330  Status post Amiodarone bolus in the ED with conversion   K, Mag, Ca appropriate   Not volume overloaded   TSH pending   Admit patient to med/surg under inpatient status  Cardiology consult   Increase metoprolol to 50 mg BID  Extra 25 mg ordered for now   Follow up TSH   Trend K, Mag   Check ECHO  Heparin drip for now  May keep off A/C per discussion with Cardiology, however further decisions pending   SSS (sick sinus syndrome) (HCC)  Status post pacemaker  Interrogation here shows about 25 minutes total of A. Fib burden since 2330 last evening   Plan as primary problem   Elevated troponin level not due to acute coronary syndrome  Noted at 62, did not have chest pain throughout entire event. Suspect due to tachy-arrhythmia   Trend   Patient is on heparin drip for A. Fib, not ACS   CAD (coronary artery disease)  Prior PCI to LAD in 2002. No chest pain   Continue ASA, Lipitor   Increase Metoprolol to 50 mg BID   Monitor troponin (plan as above)  Hypertension  BP acceptable   Increase Metoprolol to 50 mg BID   Monitor closely   Type 2 diabetes mellitus (HCC)  Lab Results   Component Value Date    HGBA1C 5.9 (H) 10/04/2024       Recent Labs     12/12/24  1159   POCGLU 245*       Blood Sugar Average: Last 72 hrs:  (P) 245  Recent A1c acceptable. Hyperglycemic on admission   Hold Amaryl   SSI   QID Accuchecks       VTE Pharmacologic Prophylaxis: VTE Score: 3 Moderate Risk (Score 3-4) - Pharmacological DVT Prophylaxis Ordered: heparin drip.  Code Status: Full Code   Discussion with family: Updated  (wife) at bedside.    Anticipated Length of Stay: Patient will  be admitted on an inpatient basis with an anticipated length of stay of greater than 2 midnights secondary to as per above assessment and plan .    History of Present Illness   Chief Complaint: Dizziness, SOB    Jhonny Knowles Sr. is a 89 y.o. male with a PMH of CAD status post PCI to LAD, SSS Status post pacemaker, T2DM, HTN who presents with dizziness and shortness of breath. He reports symptoms starting last night. He reports any time that he tried to get up and walk he became dizzy and short of breath. He did not pass out. He did not have chest pain. He reports that he went to sleep and then woke up feeling well. He reports going to have breakfast, but then stated after he had symptoms return so he decided to come to the hospital. He denied recent fevers or chills. States that he had been eating and drinking well and had breakfast before coming to the hospital but then vomited it up. He had no other episodes of nausea or vomiting. He presented only feels tired from the entire ordeal.     Review of Systems   Constitutional:  Negative for appetite change, chills, diaphoresis, fatigue and fever.   HENT:  Negative for congestion, rhinorrhea and sore throat.    Eyes:  Negative for visual disturbance.   Respiratory:  Positive for shortness of breath. Negative for cough, chest tightness and wheezing.    Cardiovascular:  Negative for chest pain, palpitations and leg swelling.   Gastrointestinal:  Positive for vomiting. Negative for abdominal pain, constipation, diarrhea and nausea.   Genitourinary:  Negative for dysuria.   Musculoskeletal:  Negative for arthralgias and myalgias.   Neurological:  Positive for dizziness. Negative for syncope, weakness, light-headedness, numbness and headaches.   All other systems reviewed and are negative.      Historical Information   Past Medical History:   Diagnosis Date    Basal cell carcinoma     Benign polyp of large intestine     Cancer (HCC)     Diabetes mellitus (HCC) 2010     Osteoarthritis of left hip     last assessed 09Jan2017    Piriformis syndrome of left side     last assessed 18Oct2016     Past Surgical History:   Procedure Laterality Date    APPENDECTOMY  1966    CARDIAC PACEMAKER PLACEMENT  05/01/2005    CATARACT EXTRACTION Left 10/01/1995    CATARACT EXTRACTION Right 02/01/2000    CORONARY ANGIOPLASTY  01/01/2010 2010    CORONARY ANGIOPLASTY WITH STENT PLACEMENT  11/01/2002    EYE SURGERY  Cataract  2000 & 2002    INGUINAL HERNIA REPAIR Right 01/01/1998    INGUINAL HERNIA REPAIR Left 01/01/2009    2009    JOINT REPLACEMENT  2010 &  2017    LUMBAR LAMINECTOMY  01/01/2002    2002    REPLACEMENT TOTAL KNEE Right 06/01/2010    right total knee replacement with complications    RHIZOTOMY      TONSILLECTOMY AND ADENOIDECTOMY  01/01/1937    1937    TOTAL HIP ARTHROPLASTY Left 01/18/2017     Social History     Tobacco Use    Smoking status: Never    Smokeless tobacco: Never   Vaping Use    Vaping status: Never Used   Substance and Sexual Activity    Alcohol use: No    Drug use: Never    Sexual activity: Not Currently     Partners: Female     Birth control/protection: None     E-Cigarette/Vaping    E-Cigarette Use Never User      E-Cigarette/Vaping Substances    Nicotine No     THC No     CBD No     Flavoring No      Family History   Problem Relation Age of Onset    Coronary artery disease Mother     Diabetes Maternal Grandmother      Social History:  Marital Status: /Civil Union   Occupation: Noncontributory   Patient Pre-hospital Living Situation: Home  Patient Pre-hospital Level of Mobility: walks  Patient Pre-hospital Diet Restrictions: None    Meds/Allergies   I have reviewed home medications using recent Epic encounter.  Prior to Admission medications    Medication Sig Start Date End Date Taking? Authorizing Provider   acetaminophen (TYLENOL) 325 mg tablet Take 650 mg by mouth every 6 (six) hours as needed for mild pain  Patient not taking: Reported on 2/5/2024     Historical Provider, MD   aspirin 81 MG tablet Take 1 tablet by mouth daily Except sun     Historical Provider, MD   atorvastatin (LIPITOR) 10 mg tablet TAKE 1 TABLET DAILY 9/5/24   Jorge العراقي MD   cephalexin (KEFLEX) 500 mg capsule For dental visits 3/16/19   Historical Provider, MD   glimepiride (AMARYL) 1 mg tablet TAKE 1 TABLET DAILY 9/5/24   Jorge Beckford MD   glucose blood (OneTouch Ultra) test strip Patient tests once daily. 4/12/24   Jorge Beckford MD   Lancets (OneTouch Delica Plus [id number]) MISC Use 1 application. daily 1/29/24   Jorge Beckford MD   leuprolide (Eligard) 22.5 mg Inject 22.5 mg under the skin every 3 (three) months  Patient not taking: Reported on 10/18/2024    Historical Provider, MD   metoprolol tartrate (LOPRESSOR) 25 mg tablet TAKE 1 TABLET EVERY EVENING (TAKING 50 MG TABLET IN THE MORNING AND 25 MG TABLET IN THE EVENING) 10/8/24   Jorge العراقي MD   metoprolol tartrate (LOPRESSOR) 50 mg tablet TAKE 1 TABLET EVERY MORNING (TAKING BOTH A 50 MG TABLET IN THE MORNING AND A 25 MG TABLET IN THE EVENING) 2/5/24   Jorge العراقي MD   Multiple Vitamins-Minerals (MULTIVITAMIN ADULT PO) Take 1 tablet by mouth every other day.    Historical Provider, MD     Allergies   Allergen Reactions    Penicillins Rash    Plavix [Clopidogrel] Rash       Objective :  Temp:  [97.6 °F (36.4 °C)] 97.6 °F (36.4 °C)  HR:  [] 108  BP: (113-148)/(55-78) 113/55  Resp:  [16-18] 18  SpO2:  [93 %-100 %] 96 %  O2 Device: None (Room air)    Physical Exam  Vitals and nursing note reviewed.   Constitutional:       General: He is sleeping. He is not in acute distress.     Appearance: Normal appearance. He is normal weight. He is not ill-appearing or diaphoretic.   HENT:      Head: Normocephalic and atraumatic.      Mouth/Throat:      Mouth: Mucous membranes are moist.   Eyes:      General: No scleral icterus.     Pupils: Pupils are equal, round, and reactive to light.    Cardiovascular:      Rate and Rhythm: Normal rate and regular rhythm.      Pulses: Normal pulses.      Heart sounds: Normal heart sounds, S1 normal and S2 normal. No murmur heard.     No systolic murmur is present.      No diastolic murmur is present.      No gallop. No S3 or S4 sounds.   Pulmonary:      Effort: Pulmonary effort is normal. No accessory muscle usage or respiratory distress.      Breath sounds: Normal breath sounds. No stridor. No decreased breath sounds, wheezing, rhonchi or rales.   Chest:      Chest wall: No tenderness.   Abdominal:      General: Bowel sounds are normal. There is no distension.      Palpations: Abdomen is soft.      Tenderness: There is no abdominal tenderness. There is no guarding.   Musculoskeletal:      Right lower leg: Edema (Chronic) present.      Left lower leg: No edema.   Skin:     General: Skin is warm and dry.      Coloration: Skin is not jaundiced.   Neurological:      General: No focal deficit present.      Mental Status: He is easily aroused. Mental status is at baseline.      Motor: No tremor or seizure activity.   Psychiatric:         Behavior: Behavior is cooperative.          Lines/Drains:            Lab Results: I have reviewed the following results:  Results from last 7 days   Lab Units 12/12/24  1142   WBC Thousand/uL 7.47   HEMOGLOBIN g/dL 11.9*   HEMATOCRIT % 36.5   PLATELETS Thousands/uL 272   SEGS PCT % 74   LYMPHO PCT % 16   MONO PCT % 8   EOS PCT % 2     Results from last 7 days   Lab Units 12/12/24  1142   SODIUM mmol/L 137   POTASSIUM mmol/L 4.3   CHLORIDE mmol/L 105   CO2 mmol/L 24   BUN mg/dL 27*   CREATININE mg/dL 1.04   ANION GAP mmol/L 8   CALCIUM mg/dL 9.1   ALBUMIN g/dL 4.0   TOTAL BILIRUBIN mg/dL 0.62   ALK PHOS U/L 53   ALT U/L 21   AST U/L 30   GLUCOSE RANDOM mg/dL 267*     Results from last 7 days   Lab Units 12/12/24  1142   INR  1.08     Results from last 7 days   Lab Units 12/12/24  1159   POC GLUCOSE mg/dl 245*     Lab Results    Component Value Date    HGBA1C 5.9 (H) 10/04/2024    HGBA1C 6.5 (H) 04/08/2024    HGBA1C 6.5 (H) 11/28/2023     Results from last 7 days   Lab Units 12/12/24  1142   LACTIC ACID mmol/L 3.0*   PROCALCITONIN ng/ml <0.05       Imaging Results Review: I personally reviewed the following image studies/reports in PACS and discussed pertinent findings with Radiology: chest xray. My interpretation of the radiology images/reports is: NAD, pacemaker noted.  Other Study Results Review: Other studies reviewed include: Pacemaker interrogation with Cardiology    Administrative Statements   I have spent a total time of 75 minutes in caring for this patient on the day of the visit/encounter including Diagnostic results, Instructions for management, Impressions, Counseling / Coordination of care, Documenting in the medical record, Reviewing / ordering tests, medicine, procedures  , Obtaining or reviewing history  , and Communicating with other healthcare professionals .    ** Please Note: This note has been constructed using a voice recognition system. **

## 2024-12-12 NOTE — ASSESSMENT & PLAN NOTE
Prior PCI to LAD in 2002. No chest pain   Continue ASA, Lipitor   Increase Metoprolol to 50 mg BID   Monitor troponin (plan as above)

## 2024-12-12 NOTE — ASSESSMENT & PLAN NOTE
Status post pacemaker  Interrogation here shows about 25 minutes total of A. Fib burden since 2330 last evening   Plan as primary problem

## 2024-12-12 NOTE — SEPSIS NOTE
Sepsis Note   Jhonny Knowles Sr. 89 y.o. male MRN: [id number]  Unit/Bed#: ED-27 Encounter: [id number]       Initial Sepsis Screening       Row Name 12/12/24 1200                Is the patient's history suggestive of a new or worsening infection? No  -CL                  User Key  (r) = Recorded By, (t) = Taken By, (c) = Cosigned By      Initials Name Provider Type    CL Sam Sorto MD Physician                        There is no height or weight on file to calculate BMI.  Wt Readings from Last 1 Encounters:   10/18/24 69.6 kg (153 lb 8 oz)        Ideal body weight: 73 kg (160 lb 15 oz)

## 2024-12-12 NOTE — ED PROVIDER NOTES
Time reflects when diagnosis was documented in both MDM as applicable and the Disposition within this note       Time User Action Codes Description Comment    12/12/2024 11:56 AM Sam Sorto Add [I48.91] New onset a-fib (HCC)     12/12/2024 11:56 AM Sam Sorto Add [R55] Syncope     12/12/2024 11:56 AM Sam Sorto Add [R06.00] Dyspnea     12/12/2024 12:23 PM Sam Sorto Add [I48.91] Atrial fibrillation with rapid ventricular response (HCC)           ED Disposition       ED Disposition   Admit    Condition   Stable    Date/Time   Thu Dec 12, 2024 12:27 PM    Comment   Case was discussed with SLIM and the patient's admission status was agreed to be Admission Status: inpatient status to the service of Dr. Sharma  .               Assessment & Plan       Medical Decision Making  Patient is a 89-year-old male, with a history significant for CAD per my review the medical record, who presents to the ED today, via EMS, due to sudden onset dizziness.  Per patient and his wife (present in room and providing collateral history), the patient was in his usual state of health earlier today/about to get a haircut when, while walking, he experienced sudden onset dizziness.  EMS was called and promptly arrived.  Upon EMS arrival, patient had a syncopal episode.  Rhythm strip was concerning to EMS for V. tach but no CPR was performed.  On arrival to the emergency department, patient was unresponsive.  Glucose 270.  ECG notable for A-fib, new to patient.  There is associated emesis.  After a short period in the emergency room, patient regained alertness.  Both patient and wife confirm that patient is full code.  Patient's wife states patient has not had recent fever.  Patient denies chest pain, abdominal pain.  He reports dyspnea.  Patient is currently tachycardic and hemodynamically stable.  Physical exam is notable for rales, lower extremity edema, tachycardia with a regular rhythm.  This  presentation is concerning for: Arrhythmia, ACS, electrolyte abnormality, pacemaker failure, syncope, anemia.  Doubt seizure based on history physical exam.  Will investigate with sepsis panel order set, cardiac workup, electrolytes.  Will manage with antiemetics, amiodarone, magnesium, further based upon workup    Amount and/or Complexity of Data Reviewed  Labs: ordered.  Radiology: ordered.    Risk  Prescription drug management.        ED Course as of 12/12/24 1229   Thu Dec 12, 2024   1139 ECG per my independent interpretation: Tachycardic rate, irregular rhythm,PVCs present, normal axis, no ST elevations    1155 On reevaluation, patient continues to mentate well.  I discussed case with Dr. Powell from cardiology: Affirms that ECG shows no STEMI despite automated read of acute MI/STEMI.  Anticoagulation recommended.  Patient denies GI bleed/history of intracranial hemorrhage.  Most recent CT head shows no mass lesions.   1200 Hemoglobin(!): 11.9  Low, similar to prior   1209 After 70 mg of amiodarone, patient's A-fib with RVR broke: Patient now in a paced rhythm with ectopy.  Cardiology aware.  Further amiodarone held   1218 LACTIC ACID(!): 3.0  Elevated, likely secondary to arrhythmia      1218 On reevaluation, patient continues to mentate well   1223 hs TnI 0hr(!): 62  Elevated    1228 Patient admitted        Medications   lidocaine (cardiac) (FOR EMS ONLY) 20 mg/mL injection 100 mg (has no administration in time range)   magnesium sulfate 2 g/50 mL IVPB (premix) 2 g (2 g Intravenous New Bag 12/12/24 1145)   amiodarone (CORDARONE) 900 mg in dextrose 5 % 500 mL infusion (1 mg/min Intravenous Not Given 12/12/24 1203)     Followed by   amiodarone (CORDARONE) 900 mg in dextrose 5 % 500 mL infusion (has no administration in time range)   heparin (porcine) 25,000 units in 0.45% NaCl 250 mL infusion (premix) (12 Units/kg/hr × 70 kg (Order-Specific) Intravenous New Bag 12/12/24 1209)   ondansetron (ZOFRAN) injection  4 mg (4 mg Intravenous Given 12/12/24 1144)   sodium chloride 0.9 % bolus 250 mL (0 mL Intravenous Stopped 12/12/24 1221)   amiodarone 150 mg in dextrose 5 % 100 mL IV bolus (0 mg Intravenous Stopped 12/12/24 1155)       ED Risk Strat Scores                                              History of Present Illness       Chief Complaint   Patient presents with    Dizziness     As per EMS, Pt reported dizziness and was in Rapid Afib that progressed to Vtach; + LOC       Past Medical History:   Diagnosis Date    Basal cell carcinoma     Benign polyp of large intestine     Cancer (HCC)     Diabetes mellitus (HCC) 2010    Osteoarthritis of left hip     last assessed 09Jan2017    Piriformis syndrome of left side     last assessed 18Oct2016      Past Surgical History:   Procedure Laterality Date    APPENDECTOMY  1966    CARDIAC PACEMAKER PLACEMENT  05/01/2005    CATARACT EXTRACTION Left 10/01/1995    CATARACT EXTRACTION Right 02/01/2000    CORONARY ANGIOPLASTY  01/01/2010 2010    CORONARY ANGIOPLASTY WITH STENT PLACEMENT  11/01/2002    EYE SURGERY  Cataract  2000 & 2002    INGUINAL HERNIA REPAIR Right 01/01/1998    INGUINAL HERNIA REPAIR Left 01/01/2009    2009    JOINT REPLACEMENT  2010 &  2017    LUMBAR LAMINECTOMY  01/01/2002    2002    REPLACEMENT TOTAL KNEE Right 06/01/2010    right total knee replacement with complications    RHIZOTOMY      TONSILLECTOMY AND ADENOIDECTOMY  01/01/1937    1937    TOTAL HIP ARTHROPLASTY Left 01/18/2017      Family History   Problem Relation Age of Onset    Coronary artery disease Mother     Diabetes Maternal Grandmother       Social History     Tobacco Use    Smoking status: Never    Smokeless tobacco: Never   Vaping Use    Vaping status: Never Used   Substance Use Topics    Alcohol use: No    Drug use: Never      E-Cigarette/Vaping    E-Cigarette Use Never User       E-Cigarette/Vaping Substances    Nicotine No     THC No     CBD No     Flavoring No       I have reviewed and agree  with the history as documented.     Patient is a 89-year-old male, with a history significant for CAD per my review the medical record, who presents to the ED today, via EMS, due to sudden onset dizziness.  Per patient and his wife (present in room and providing collateral history), the patient was in his usual state of health earlier today/about to get a haircut when, while walking, he experienced sudden onset dizziness.  EMS was called and promptly arrived.  Upon EMS arrival, patient had a syncopal episode.  Rhythm strip was concerning to EMS for V. tach but no CPR was performed.  On arrival to the emergency department, patient was unresponsive.  Glucose 270.  ECG notable for A-fib, new to patient.  After a short period in the emergency room, patient regained alertness.  Both patient and wife confirm that patient is full code.  Patient's wife states patient has not had recent fever.  Patient denies chest pain, abdominal pain.  He reports dyspnea.  Remainder of HPI limited due to acuity of condition.        Review of Systems   Unable to perform ROS: Acuity of condition   Constitutional:  Negative for fever.   Respiratory:  Positive for shortness of breath.    Cardiovascular:  Positive for leg swelling. Negative for chest pain.   Gastrointestinal:  Negative for abdominal pain.           Objective       ED Triage Vitals   Temperature Pulse Blood Pressure Respirations SpO2 Patient Position - Orthostatic VS   12/12/24 1220 12/12/24 1125 12/12/24 1140 12/12/24 1140 12/12/24 1125 12/12/24 1140   97.6 °F (36.4 °C) (!) 137 148/60 16 100 % Sitting      Temp Source Heart Rate Source BP Location FiO2 (%) Pain Score    12/12/24 1220 12/12/24 1125 12/12/24 1140 -- 12/12/24 1140    Oral Monitor Left arm  No Pain      Vitals      Date and Time Temp Pulse SpO2 Resp BP Pain Score FACES Pain Rating User   12/12/24 1225 -- 62 95 % 18 130/60 -- -- ML   12/12/24 1221 -- 60 97 % 18 128/60 -- -- ML   12/12/24 1220 97.6 °F (36.4 °C) 60 93  % 18 128/60 No Pain -- ML   12/12/24 1215 -- 60 94 % 18 131/60 -- -- ML   12/12/24 1210 -- 87 95 % 18 127/58 -- -- ML   12/12/24 1205 -- 62 94 % 18 133/60 No Pain -- ML   12/12/24 1200 -- 106 95 % 18 124/57 -- -- ML   12/12/24 1156 -- 127 95 % 18 121/57 -- -- ML   12/12/24 1153 -- 135 93 % 18 147/65 No Pain -- ML   12/12/24 1150 -- 129 96 % 18 135/64 No Pain -- ML   12/12/24 1146 -- 120 93 % 18 146/78 No Pain -- ML   12/12/24 1143 -- 139 96 % 16 146/78 Manual by LOR Li -- -- ML   12/12/24 1140 -- 143 96 % 16 148/60 Manual by Jen RN No Pain -- ML   12/12/24 1135 -- 175 96 % -- -- -- -- ML   12/12/24 1125 -- 137 100 % -- -- -- -- ML            Physical Exam  Vitals and nursing note reviewed. Exam conducted with a chaperone present.   Constitutional:       General: He is in acute distress.      Appearance: He is ill-appearing and toxic-appearing.      Comments: On arrival, patient initially unresponsive but seconds later began answering questions without confusion.   HENT:      Head: Normocephalic.      Right Ear: External ear normal.      Left Ear: External ear normal.      Nose: Nose normal. No congestion.      Mouth/Throat:      Mouth: Mucous membranes are moist.      Pharynx: Oropharynx is clear. No oropharyngeal exudate or posterior oropharyngeal erythema.   Eyes:      General: No scleral icterus.        Right eye: No discharge.         Left eye: No discharge.      Conjunctiva/sclera: Conjunctivae normal.      Pupils: Pupils are equal, round, and reactive to light.   Cardiovascular:      Rate and Rhythm: Tachycardia present. Rhythm irregular.      Pulses: Normal pulses.      Heart sounds: No murmur heard.     No friction rub. No gallop.   Pulmonary:      Effort: No respiratory distress.      Breath sounds: No stridor. Rales present. No wheezing or rhonchi.   Chest:      Chest wall: No tenderness.   Abdominal:      General: Abdomen is flat. There is no distension.      Palpations: Abdomen is soft. There is no  mass.      Tenderness: There is no abdominal tenderness. There is no right CVA tenderness, left CVA tenderness, guarding or rebound.      Hernia: No hernia is present.   Genitourinary:     Penis: Normal.       Testes: Normal.   Musculoskeletal:      Cervical back: Normal range of motion and neck supple.      Right lower leg: Edema present.      Left lower leg: Edema present.   Lymphadenopathy:      Cervical: No cervical adenopathy.   Skin:     General: Skin is warm and dry.      Capillary Refill: Capillary refill takes more than 3 seconds.   Neurological:      Comments: Patient is speaking clearly in complete sentences.  Patient is answering appropriately and able follow commands.  Patient is moving all four extremities spontaneously.  No facial droop.  Tongue midline.      Psychiatric:         Mood and Affect: Mood normal.         Behavior: Behavior normal.         Results Reviewed       Procedure Component Value Units Date/Time    Procalcitonin [[id number]]  (Normal) Collected: 12/12/24 1142    Lab Status: Final result Specimen: Blood from Arm, Left Updated: 12/12/24 1224     Procalcitonin <0.05 ng/ml     HS Troponin 0hr (reflex protocol) [[id number]]  (Abnormal) Collected: 12/12/24 1142    Lab Status: Final result Specimen: Blood from Arm, Left Updated: 12/12/24 1222     hs TnI 0hr 62 ng/L     HS Troponin I 2hr [[id number]]     Lab Status: No result Specimen: Blood     HS Troponin I 4hr [[id number]]     Lab Status: No result Specimen: Blood     APTT [[id number]]  (Normal) Collected: 12/12/24 1142    Lab Status: Final result Specimen: Blood from Arm, Left Updated: 12/12/24 1219     PTT 26 seconds     Protime-INR [[id number]]  (Normal) Collected: 12/12/24 1142    Lab Status: Final result Specimen: Blood from Arm, Left Updated: 12/12/24 1219     Protime 14.7 seconds      INR 1.08    Narrative:      INR Therapeutic Range    Indication                                             INR Range      Atrial Fibrillation                                                2.0-3.0  Hypercoagulable State                                    2.0.2.3  Left Ventricular Asist Device                            2.0-3.0  Mechanical Heart Valve                                  -    Aortic(with afib, MI, embolism, HF, LA enlargement,    and/or coagulopathy)                                     2.0-3.0 (2.5-3.5)     Mitral                                                             2.5-3.5  Prosthetic/Bioprosthetic Heart Valve               2.0-3.0  Venous thromboembolism (VTE: VT, PE        2.0-3.0    Calcium, ionized [[id number]]  (Normal) Collected: 12/12/24 1148    Lab Status: Final result Specimen: Blood from Arm, Right Updated: 12/12/24 1217     Calcium, Ionized 1.12 mmol/L     Comprehensive metabolic panel [[id number]]  (Abnormal) Collected: 12/12/24 1142    Lab Status: Final result Specimen: Blood from Arm, Left Updated: 12/12/24 1215     Sodium 137 mmol/L      Potassium 4.3 mmol/L      Chloride 105 mmol/L      CO2 24 mmol/L      ANION GAP 8 mmol/L      BUN 27 mg/dL      Creatinine 1.04 mg/dL      Glucose 267 mg/dL      Calcium 9.1 mg/dL      AST 30 U/L      ALT 21 U/L      Alkaline Phosphatase 53 U/L      Total Protein 6.7 g/dL      Albumin 4.0 g/dL      Total Bilirubin 0.62 mg/dL      eGFR 62 ml/min/1.73sq m     Narrative:      [employer] guidelines for Chronic Kidney Disease (CKD):     Stage 1 with normal or high GFR (GFR > 90 mL/min/1.73 square meters)    Stage 2 Mild CKD (GFR = 60-89 mL/min/1.73 square meters)    Stage 3A Moderate CKD (GFR = 45-59 mL/min/1.73 square meters)    Stage 3B Moderate CKD (GFR = 30-44 mL/min/1.73 square meters)    Stage 4 Severe CKD (GFR = 15-29 mL/min/1.73 square meters)    Stage 5 End Stage CKD (GFR <15 mL/min/1.73 square meters)  Note: GFR calculation is accurate only with a steady state creatinine    Lactic acid, plasma (w/reflex if result > 2.0) [[id number]]  (Abnormal) Collected: 12/12/24 1142     Lab Status: Final result Specimen: Blood from Arm, Left Updated: 12/12/24 1214     LACTIC ACID 3.0 mmol/L     Narrative:      Result may be elevated if tourniquet was used during collection.    Lactic acid 2 Hours [[id number]]     Lab Status: No result Specimen: Blood     Fingerstick Glucose (POCT) [[id number]]  (Abnormal) Collected: 12/12/24 1159    Lab Status: Final result Specimen: Blood Updated: 12/12/24 1200     POC Glucose 245 mg/dl     CBC and differential [[id number]]  (Abnormal) Collected: 12/12/24 1142    Lab Status: Final result Specimen: Blood from Arm, Left Updated: 12/12/24 1156     WBC 7.47 Thousand/uL      RBC 3.83 Million/uL      Hemoglobin 11.9 g/dL      Hematocrit 36.5 %      MCV 95 fL      MCH 31.1 pg      MCHC 32.6 g/dL      RDW 13.3 %      MPV 10.4 fL      Platelets 272 Thousands/uL      nRBC 0 /100 WBCs      Segmented % 74 %      Immature Grans % 0 %      Lymphocytes % 16 %      Monocytes % 8 %      Eosinophils Relative 2 %      Basophils Relative 0 %      Absolute Neutrophils 5.48 Thousands/µL      Absolute Immature Grans 0.03 Thousand/uL      Absolute Lymphocytes 1.22 Thousands/µL      Absolute Monocytes 0.59 Thousand/µL      Eosinophils Absolute 0.12 Thousand/µL      Basophils Absolute 0.03 Thousands/µL     Magnesium [[id number]] Collected: 12/12/24 1148    Lab Status: In process Specimen: Blood from Arm, Right Updated: 12/12/24 1156    B-Type Natriuretic Peptide(BNP) [[id number]] Collected: 12/12/24 1149    Lab Status: In process Specimen: Blood from Arm, Right Updated: 12/12/24 1156    Blood culture #1 [[id number]] Collected: 12/12/24 1143    Lab Status: In process Specimen: Blood from Arm, Right Updated: 12/12/24 1149    Blood culture #2 [[id number]] Collected: 12/12/24 1142    Lab Status: In process Specimen: Blood from Arm, Left Updated: 12/12/24 1149    POCT Blood Gas (CG8+) [[id number]]  (Abnormal) Collected: 12/12/24 1141    Lab Status: Final result Specimen: Venous Updated: 12/12/24 1148      ph, Gerard ISTAT 7.386     pCO2, Gerard i-STAT 39.9 mm HG      pO2, Gerard i-STAT 42.0 mm HG      BE, i-STAT -1 mmol/L      HCO3, Gerard i-STAT 23.9 mmol/L      CO2, i-STAT 25 mmol/L      O2 Sat, i-STAT 77 %      SODIUM, I-STAT 139 mmol/l      Potassium, i-STAT 4.5 mmol/L      Calcium, Ionized i-STAT 1.22 mmol/L      Hct, i-STAT 34 %      Hgb, i-STAT 11.6 g/dl      Glucose, i-STAT 270 mg/dl      Specimen Type VENOUS    UA w Reflex to Microscopic w Reflex to Culture [[id number]]     Lab Status: No result Specimen: Urine             XR chest 1 view portable   ED Interpretation by Sam Sorto MD (12/12 1211)   Per my independent interpretation: No acute cardiopulmonary process          CriticalCare Time    Date/Time: 12/12/2024 12:28 PM    Performed by: Sam Sorto MD  Authorized by: Sam Sorto MD    Critical care provider statement:     Critical care time (minutes):  40    Critical care start time:  12/12/2024 11:31 AM    Critical care end time:  12/12/2024 12:11 PM    Critical care time was exclusive of:  Separately billable procedures and treating other patients and teaching time    Critical care was necessary to treat or prevent imminent or life-threatening deterioration of the following conditions:  Circulatory failure and shock    Critical care was time spent personally by me on the following activities:  Obtaining history from patient or surrogate, development of treatment plan with patient or surrogate, discussions with consultants, evaluation of patient's response to treatment, examination of patient, ordering and performing treatments and interventions, ordering and review of laboratory studies, ordering and review of radiographic studies, re-evaluation of patient's condition and review of old charts    I assumed direction of critical care for this patient from another provider in my specialty: no        ED Medication and Procedure Management   Prior to Admission Medications    Prescriptions Last Dose Informant Patient Reported? Taking?   Lancets (OneTouch Delica Plus [id number]) MISC  Self No No   Sig: Use 1 application. daily   Multiple Vitamins-Minerals (MULTIVITAMIN ADULT PO)  Self Yes No   Sig: Take 1 tablet by mouth every other day.   acetaminophen (TYLENOL) 325 mg tablet  Self Yes No   Sig: Take 650 mg by mouth every 6 (six) hours as needed for mild pain   Patient not taking: Reported on 2/5/2024   aspirin 81 MG tablet  Self Yes No   Sig: Take 1 tablet by mouth daily Except sun    atorvastatin (LIPITOR) 10 mg tablet  Self No No   Sig: TAKE 1 TABLET DAILY   cephalexin (KEFLEX) 500 mg capsule  Self Yes No   Sig: For dental visits   glimepiride (AMARYL) 1 mg tablet  Self No No   Sig: TAKE 1 TABLET DAILY   glucose blood (OneTouch Ultra) test strip  Self No No   Sig: Patient tests once daily.   leuprolide (Eligard) 22.5 mg  Self Yes No   Sig: Inject 22.5 mg under the skin every 3 (three) months   Patient not taking: Reported on 10/18/2024   metoprolol tartrate (LOPRESSOR) 25 mg tablet  Self No No   Sig: TAKE 1 TABLET EVERY EVENING (TAKING 50 MG TABLET IN THE MORNING AND 25 MG TABLET IN THE EVENING)   metoprolol tartrate (LOPRESSOR) 50 mg tablet  Self No No   Sig: TAKE 1 TABLET EVERY MORNING (TAKING BOTH A 50 MG TABLET IN THE MORNING AND A 25 MG TABLET IN THE EVENING)      Facility-Administered Medications: None     Current Discharge Medication List        CONTINUE these medications which have NOT CHANGED    Details   acetaminophen (TYLENOL) 325 mg tablet Take 650 mg by mouth every 6 (six) hours as needed for mild pain      aspirin 81 MG tablet Take 1 tablet by mouth daily Except sun       atorvastatin (LIPITOR) 10 mg tablet TAKE 1 TABLET DAILY  Qty: 90 tablet, Refills: 1    Associated Diagnoses: Pure hypercholesterolemia      cephalexin (KEFLEX) 500 mg capsule For dental visits  Refills: 0      glimepiride (AMARYL) 1 mg tablet TAKE 1 TABLET DAILY  Qty: 90 tablet, Refills: 1     Associated Diagnoses: Type 2 diabetes mellitus without complication, without long-term current use of insulin ([facility])      glucose blood (OneTouch Ultra) test strip Patient tests once daily.  Qty: 100 strip, Refills: 1    Associated Diagnoses: Type 2 diabetes mellitus without complication, without long-term current use of insulin ([facility])      Lancets (OneTouch Delica Plus [id number]) MISC Use 1 application. daily  Qty: 100 each, Refills: 1    Comments: TESTS DAILY  Associated Diagnoses: Type 2 diabetes mellitus without complication, without long-term current use of insulin ([facility])      leuprolide (Eligard) 22.5 mg Inject 22.5 mg under the skin every 3 (three) months      !! metoprolol tartrate (LOPRESSOR) 25 mg tablet TAKE 1 TABLET EVERY EVENING (TAKING 50 MG TABLET IN THE MORNING AND 25 MG TABLET IN THE EVENING)  Qty: 90 tablet, Refills: 1    Associated Diagnoses: Essential (primary) hypertension      !! metoprolol tartrate (LOPRESSOR) 50 mg tablet TAKE 1 TABLET EVERY MORNING (TAKING BOTH A 50 MG TABLET IN THE MORNING AND A 25 MG TABLET IN THE EVENING)  Qty: 90 tablet, Refills: 3    Associated Diagnoses: Essential (primary) hypertension      Multiple Vitamins-Minerals (MULTIVITAMIN ADULT PO) Take 1 tablet by mouth every other day.       !! - Potential duplicate medications found. Please discuss with provider.        No discharge procedures on file.  ED SEPSIS DOCUMENTATION   Time reflects when diagnosis was documented in both MDM as applicable and the Disposition within this note       Time User Action Codes Description Comment    12/12/2024 11:56 AM Sam Sorto Add [I48.91] New onset a-fib (HCC)     12/12/2024 11:56 AM Sam Sorto Add [R55] Syncope     12/12/2024 11:56 AM Sam Sorto Add [R06.00] Dyspnea     12/12/2024 12:23 PM Sam Sorto Add [I48.91] Atrial fibrillation with rapid ventricular response (HCC)            Initial Sepsis Screening       Row Name 12/12/24 1200                 Is the patient's history suggestive of a new or worsening infection? No  -CL                  User Key  (r) = Recorded By, (t) = Taken By, (c) = Cosigned By      Initials Name Provider Type    CL Sam Sorto MD Physician                       Sam Sorto MD  12/12/24 4330

## 2024-12-13 ENCOUNTER — APPOINTMENT (INPATIENT)
Dept: NON INVASIVE DIAGNOSTICS | Facility: HOSPITAL | Age: 89
DRG: 310 | End: 2024-12-13
Payer: MEDICARE

## 2024-12-13 LAB
ALBUMIN SERPL BCG-MCNC: 3.8 G/DL (ref 3.5–5)
ALP SERPL-CCNC: 45 U/L (ref 34–104)
ALT SERPL W P-5'-P-CCNC: 20 U/L (ref 7–52)
ANION GAP SERPL CALCULATED.3IONS-SCNC: 4 MMOL/L (ref 4–13)
AORTIC ROOT: 3.2 CM
AORTIC VALVE MEAN VELOCITY: 13.9 M/S
APICAL FOUR CHAMBER EJECTION FRACTION: 63 %
ASCENDING AORTA: 2.9 CM
AST SERPL W P-5'-P-CCNC: 24 U/L (ref 13–39)
AV AREA BY CONTINUOUS VTI: 2.5 CM2
AV AREA PEAK VELOCITY: 3.2 CM2
AV LVOT MEAN GRADIENT: 4 MMHG
AV LVOT PEAK GRADIENT: 7 MMHG
AV MEAN GRADIENT: 9 MMHG
AV PEAK GRADIENT: 16 MMHG
AV REGURGITATION PRESSURE HALF TIME: 558 MS
AV VALVE AREA: 2.47 CM2
AV VELOCITY RATIO: 0.69
BILIRUB SERPL-MCNC: 0.48 MG/DL (ref 0.2–1)
BSA FOR ECHO PROCEDURE: 1.89 M2
BUN SERPL-MCNC: 29 MG/DL (ref 5–25)
CALCIUM SERPL-MCNC: 8.9 MG/DL (ref 8.4–10.2)
CHLORIDE SERPL-SCNC: 108 MMOL/L (ref 96–108)
CO2 SERPL-SCNC: 28 MMOL/L (ref 21–32)
CREAT SERPL-MCNC: 0.94 MG/DL (ref 0.6–1.3)
DOP CALC AO PEAK VEL: 1.94 M/S
DOP CALC AO VTI: 38.34 CM
DOP CALC LVOT AREA: 3.8 CM2
DOP CALC LVOT CARDIAC INDEX: 3.6 L/MIN/M2
DOP CALC LVOT CARDIAC OUTPUT: 6.8 L/MIN
DOP CALC LVOT DIAMETER: 2.2 CM
DOP CALC LVOT PEAK VEL VTI: 24.9 CM
DOP CALC LVOT PEAK VEL: 1.34 M/S
DOP CALC LVOT STROKE INDEX: 49.7 ML/M2
DOP CALC LVOT STROKE VOLUME: 94.61
DOP CALC MV VTI: 18.74 CM
E WAVE DECELERATION TIME: 150 MS
E/A RATIO: 102
ERYTHROCYTE [DISTWIDTH] IN BLOOD BY AUTOMATED COUNT: 13.4 % (ref 11.6–15.1)
FRACTIONAL SHORTENING: 44 (ref 28–44)
GFR SERPL CREATININE-BSD FRML MDRD: 71 ML/MIN/1.73SQ M
GLUCOSE SERPL-MCNC: 101 MG/DL (ref 65–140)
GLUCOSE SERPL-MCNC: 112 MG/DL (ref 65–140)
GLUCOSE SERPL-MCNC: 141 MG/DL (ref 65–140)
GLUCOSE SERPL-MCNC: 165 MG/DL (ref 65–140)
GLUCOSE SERPL-MCNC: 243 MG/DL (ref 65–140)
HCT VFR BLD AUTO: 35.7 % (ref 36.5–49.3)
HGB BLD-MCNC: 11.3 G/DL (ref 12–17)
INTERVENTRICULAR SEPTUM IN DIASTOLE (PARASTERNAL SHORT AXIS VIEW): 1.1 CM
INTERVENTRICULAR SEPTUM: 1.1 CM (ref 0.6–1.1)
LAAS-AP2: 13 CM2
LAAS-AP4: 14.8 CM2
LEFT ATRIUM SIZE: 4.1 CM
LEFT ATRIUM VOLUME (MOD BIPLANE): 36 ML
LEFT ATRIUM VOLUME INDEX (MOD BIPLANE): 19 ML/M2
LEFT INTERNAL DIMENSION IN SYSTOLE: 2.5 CM (ref 2.1–4)
LEFT VENTRICULAR INTERNAL DIMENSION IN DIASTOLE: 4.5 CM (ref 3.5–6)
LEFT VENTRICULAR POSTERIOR WALL IN END DIASTOLE: 0.8 CM
LEFT VENTRICULAR STROKE VOLUME: 68 ML
LVSV (TEICH): 68 ML
MAGNESIUM SERPL-MCNC: 2.5 MG/DL (ref 1.9–2.7)
MCH RBC QN AUTO: 30.6 PG (ref 26.8–34.3)
MCHC RBC AUTO-ENTMCNC: 31.7 G/DL (ref 31.4–37.4)
MCV RBC AUTO: 97 FL (ref 82–98)
MV E'TISSUE VEL-LAT: 9 CM/S
MV E'TISSUE VEL-SEP: 6 CM/S
MV EROA: 0.2 CM2
MV MEAN GRADIENT: 2 MMHG
MV PEAK A VEL: 0.01 M/S
MV PEAK E VEL: 102 CM/S
MV PEAK GRADIENT: 5 MMHG
MV REGURGITANT VOLUME: 35 ML
MV STENOSIS PRESSURE HALF TIME: 44 MS
MV VALVE AREA BY CONTINUITY EQUATION: 5.05 CM2
MV VALVE AREA P 1/2 METHOD: 5
PISA MRMAX VEL: 0.42 M/S
PISA RADIUS: 0.6 CM
PLATELET # BLD AUTO: 211 THOUSANDS/UL (ref 149–390)
PMV BLD AUTO: 11 FL (ref 8.9–12.7)
POTASSIUM SERPL-SCNC: 4.4 MMOL/L (ref 3.5–5.3)
PROT SERPL-MCNC: 6.3 G/DL (ref 6.4–8.4)
RA PRESSURE ESTIMATED: 10 MMHG
RBC # BLD AUTO: 3.69 MILLION/UL (ref 3.88–5.62)
RIGHT ATRIUM AREA SYSTOLE A4C: 14.7 CM2
RIGHT VENTRICLE ID DIMENSION: 4.6 CM
RV PSP: 54 MMHG
SL CV AV DECELERATION TIME RETROGRADE: 1924 MS
SL CV AV PEAK GRADIENT RETROGRADE: 41 MMHG
SL CV LEFT ATRIUM LENGTH A2C: 4.3 CM
SL CV LV EF: 70
SL CV PED ECHO LEFT VENTRICLE DIASTOLIC VOLUME (MOD BIPLANE) 2D: 90 ML
SL CV PED ECHO LEFT VENTRICLE SYSTOLIC VOLUME (MOD BIPLANE) 2D: 23 ML
SODIUM SERPL-SCNC: 140 MMOL/L (ref 135–147)
TR MAX PG: 44 MMHG
TR PEAK VELOCITY: 3.3 M/S
TRICUSPID ANNULAR PLANE SYSTOLIC EXCURSION: 2 CM
TRICUSPID VALVE PEAK REGURGITATION VELOCITY: 3.32 M/S
WBC # BLD AUTO: 8.19 THOUSAND/UL (ref 4.31–10.16)

## 2024-12-13 PROCEDURE — 82948 REAGENT STRIP/BLOOD GLUCOSE: CPT

## 2024-12-13 PROCEDURE — 93306 TTE W/DOPPLER COMPLETE: CPT | Performed by: INTERNAL MEDICINE

## 2024-12-13 PROCEDURE — 80053 COMPREHEN METABOLIC PANEL: CPT

## 2024-12-13 PROCEDURE — 99232 SBSQ HOSP IP/OBS MODERATE 35: CPT | Performed by: PHYSICIAN ASSISTANT

## 2024-12-13 PROCEDURE — 93306 TTE W/DOPPLER COMPLETE: CPT

## 2024-12-13 PROCEDURE — 85027 COMPLETE CBC AUTOMATED: CPT

## 2024-12-13 PROCEDURE — 83735 ASSAY OF MAGNESIUM: CPT | Performed by: PHYSICIAN ASSISTANT

## 2024-12-13 PROCEDURE — 99232 SBSQ HOSP IP/OBS MODERATE 35: CPT | Performed by: INTERNAL MEDICINE

## 2024-12-13 RX ORDER — AMIODARONE HYDROCHLORIDE 200 MG/1
200 TABLET ORAL
Status: DISCONTINUED | OUTPATIENT
Start: 2024-12-14 | End: 2024-12-14 | Stop reason: HOSPADM

## 2024-12-13 RX ADMIN — INSULIN LISPRO 1 UNITS: 100 INJECTION, SOLUTION INTRAVENOUS; SUBCUTANEOUS at 22:05

## 2024-12-13 RX ADMIN — ATORVASTATIN CALCIUM 10 MG: 10 TABLET, FILM COATED ORAL at 16:54

## 2024-12-13 RX ADMIN — AMIODARONE HYDROCHLORIDE 0.5 MG/MIN: 50 INJECTION, SOLUTION INTRAVENOUS at 21:30

## 2024-12-13 RX ADMIN — METOPROLOL TARTRATE 50 MG: 50 TABLET, FILM COATED ORAL at 10:46

## 2024-12-13 RX ADMIN — APIXABAN 5 MG: 5 TABLET, FILM COATED ORAL at 08:18

## 2024-12-13 RX ADMIN — APIXABAN 5 MG: 5 TABLET, FILM COATED ORAL at 16:54

## 2024-12-13 RX ADMIN — INSULIN LISPRO 2 UNITS: 100 INJECTION, SOLUTION INTRAVENOUS; SUBCUTANEOUS at 12:18

## 2024-12-13 RX ADMIN — METOPROLOL TARTRATE 50 MG: 50 TABLET, FILM COATED ORAL at 22:02

## 2024-12-13 NOTE — PLAN OF CARE
Problem: SAFETY ADULT  Goal: Patient will remain free of falls  Description: INTERVENTIONS:  - Educate patient/family on patient safety including physical limitations  - Instruct patient to call for assistance with activity   - Consult OT/PT to assist with strengthening/mobility   - Keep Call bell within reach  - Keep bed low and locked with side rails adjusted as appropriate  - Keep care items and personal belongings within reach  - Initiate and maintain comfort rounds  - Make Fall Risk Sign visible to staff  - Offer Toileting every  Hours, in advance of need  - Initiate/Maintain alarm  - Obtain necessary fall risk management equipment:   - Apply yellow socks and bracelet for high fall risk patients  - Consider moving patient to room near nurses station  Outcome: Progressing  Goal: Maintain or return to baseline ADL function  Description: INTERVENTIONS:  -  Assess patient's ability to carry out ADLs; assess patient's baseline for ADL function and identify physical deficits which impact ability to perform ADLs (bathing, care of mouth/teeth, toileting, grooming, dressing, etc.)  - Assess/evaluate cause of self-care deficits   - Assess range of motion  - Assess patient's mobility; develop plan if impaired  - Assess patient's need for assistive devices and provide as appropriate  - Encourage maximum independence but intervene and supervise when necessary  - Involve family in performance of ADLs  - Assess for home care needs following discharge   - Consider OT consult to assist with ADL evaluation and planning for discharge  - Provide patient education as appropriate  Outcome: Progressing  Goal: Maintains/Returns to pre admission functional level  Description: INTERVENTIONS:  - Perform AM-PAC 6 Click Basic Mobility/ Daily Activity assessment daily.  - Set and communicate daily mobility goal to care team and patient/family/caregiver.   - Collaborate with rehabilitation services on mobility goals if consulted  - Perform  Range of Motion  times a day.  - Reposition patient every  hours.  - Dangle patient  times a day  - Stand patient  times a day  - Ambulate patient  times a day  - Out of bed to chair  times a day   - Out of bed for meals times a day  - Out of bed for toileting  - Record patient progress and toleration of activity level   Outcome: Progressing

## 2024-12-13 NOTE — ASSESSMENT & PLAN NOTE
Lab Results   Component Value Date    HGBA1C 5.9 (H) 10/04/2024       Recent Labs     12/12/24  1159 12/12/24  2100 12/13/24  0737 12/13/24  1015   POCGLU 245* 179* 112 243*       Blood Sugar Average: Last 72 hrs:  (P) 194.75  Recent A1c acceptable. Hyperglycemic on admission   Hold Curtis BOUDREAUX   QID Accuchecks

## 2024-12-13 NOTE — ASSESSMENT & PLAN NOTE
Prior PCI to LAD in 2002. No chest pain   Troponin elevating in the setting of tachycardia on day of admission   Continue ASA, Lipitor   Increase Metoprolol to 50 mg BID   Follow up ECHO

## 2024-12-13 NOTE — ASSESSMENT & PLAN NOTE
Noted at 62, did not have chest pain throughout entire event. Suspect due to tachy-arrhythmia   4 hour troponin elevated to 227  Trend   Follow up ECHO  Cardiology following

## 2024-12-13 NOTE — PROGRESS NOTES
Progress Note - Hospitalist   Name: Jhonny Knowles Sr. 89 y.o. male I MRN: [id number]  Unit/Bed#: S -01 I Date of Admission: 12/12/2024   Date of Service: 12/13/2024 I Hospital Day: 1    Assessment & Plan  Atrial fibrillation with RVR (HCC)  POA, new onset. Per pacemaker interrogation he has had about 25 minutes total of A. Fib burden since last evening around 2330  Status post Amiodarone bolus in the ED with conversion   K, Mag, Ca appropriate   Not volume overloaded   TSH normal   Patient having frequent ectopy making his heart rate seem faster on telemetry   Cardiology consult   Increase metoprolol to 50 mg BID  Currently on Amiodarone infusion   Eliquis 5 mg BID started  Trend K, Mag   Follow up ECHO  Continue telemetry   SSS (sick sinus syndrome) (HCC)  Status post pacemaker  Interrogation here shows about 25 minutes total of A. Fib burden since 2330 last evening   Plan as primary problem   Elevated troponin level not due to acute coronary syndrome  Noted at 62, did not have chest pain throughout entire event. Suspect due to tachy-arrhythmia   4 hour troponin elevated to 227  Trend   Follow up ECHO  Cardiology following   CAD (coronary artery disease)  Prior PCI to LAD in 2002. No chest pain   Troponin elevating in the setting of tachycardia on day of admission   Continue ASA, Lipitor   Increase Metoprolol to 50 mg BID   Follow up ECHO  Hypertension  BP acceptable   Increase Metoprolol to 50 mg BID   Monitor closely   Type 2 diabetes mellitus (HCC)  Lab Results   Component Value Date    HGBA1C 5.9 (H) 10/04/2024       Recent Labs     12/12/24  1159 12/12/24  2100 12/13/24  0737 12/13/24  1015   POCGLU 245* 179* 112 243*       Blood Sugar Average: Last 72 hrs:  (P) 194.75  Recent A1c acceptable. Hyperglycemic on admission   Hold Amaryl   SSI   QID Accuchecks     VTE Pharmacologic Prophylaxis: VTE Score: 3 Moderate Risk (Score 3-4) - Pharmacological DVT Prophylaxis Ordered: apixaban (Eliquis).    Mobility:    JH-HLM Achieved: 5: Stand (1 or more minutes)  JH-HLM Goal achieved. Continue to encourage appropriate mobility.    Patient Centered Rounds: I performed bedside rounds with nursing staff today.   Discussions with Specialists or Other Care Team Provider: Discussed with RNSHAVON    Education and Discussions with Family / Patient: Attempted to update  (wife) via phone. Left voicemail.     Current Length of Stay: 1 day(s)  Current Patient Status: Inpatient   Certification Statement: The patient will continue to require additional inpatient hospital stay due to Amiodarone infusion, further cardiac work up   Discharge Plan: Anticipate discharge in 24-48 hrs to home.    Code Status: Level 1 - Full Code    Subjective   Patient reports feeling much better than yesterday. Denies SOB or dizziness. Denies chest pain.     Objective :  Temp:  [97.2 °F (36.2 °C)-97.6 °F (36.4 °C)] 97.2 °F (36.2 °C)  HR:  [] 42  BP: (108-148)/(51-78) 122/52  Resp:  [16-18] 16  SpO2:  [93 %-100 %] 98 %  O2 Device: None (Room air)    Body mass index is 22.78 kg/m².     Input and Output Summary (last 24 hours):     Intake/Output Summary (Last 24 hours) at 12/13/2024 1021  Last data filed at 12/13/2024 0844  Gross per 24 hour   Intake 915 ml   Output 800 ml   Net 115 ml       Physical Exam  Vitals and nursing note reviewed.   Constitutional:       General: He is not in acute distress.     Appearance: Normal appearance. He is normal weight. He is not ill-appearing or diaphoretic.   HENT:      Head: Normocephalic and atraumatic.      Mouth/Throat:      Mouth: Mucous membranes are moist.   Eyes:      General: No scleral icterus.     Pupils: Pupils are equal, round, and reactive to light.   Cardiovascular:      Rate and Rhythm: Normal rate. Rhythm irregular. Frequent Extrasystoles are present.     Pulses: Normal pulses.      Heart sounds: Normal heart sounds, S1 normal and S2 normal. No murmur heard.     No systolic murmur is present.       No diastolic murmur is present.      No gallop. No S3 or S4 sounds.   Pulmonary:      Effort: Pulmonary effort is normal. No accessory muscle usage or respiratory distress.      Breath sounds: Normal breath sounds. No stridor. No decreased breath sounds, wheezing, rhonchi or rales.   Chest:      Chest wall: No tenderness.   Abdominal:      General: Bowel sounds are normal. There is no distension.      Palpations: Abdomen is soft.      Tenderness: There is no abdominal tenderness. There is no guarding.   Musculoskeletal:      Right lower leg: No edema.      Left lower leg: No edema.   Skin:     General: Skin is warm and dry.      Coloration: Skin is not jaundiced.   Neurological:      General: No focal deficit present.      Mental Status: He is alert. Mental status is at baseline.      Motor: No tremor or seizure activity.   Psychiatric:         Behavior: Behavior is cooperative.           Lines/Drains:        Telemetry:  Telemetry Orders (From admission, onward)               24 Hour Telemetry Monitoring  Continuous x 24 Hours (Telem)        Question:  Reason for 24 Hour Telemetry  Answer:  Arrhythmias requiring acute medical intervention / PPM or ICD malfunction                     Telemetry Reviewed:  Ectopy   Indication for Continued Telemetry Use: Arrthymias requiring medical therapy               Lab Results: I have reviewed the following results:   Results from last 7 days   Lab Units 12/13/24  0551 12/12/24  1142   WBC Thousand/uL 8.19 7.47   HEMOGLOBIN g/dL 11.3* 11.9*   HEMATOCRIT % 35.7* 36.5   PLATELETS Thousands/uL 211 272   SEGS PCT %  --  74   LYMPHO PCT %  --  16   MONO PCT %  --  8   EOS PCT %  --  2     Results from last 7 days   Lab Units 12/13/24  0551   SODIUM mmol/L 140   POTASSIUM mmol/L 4.4   CHLORIDE mmol/L 108   CO2 mmol/L 28   BUN mg/dL 29*   CREATININE mg/dL 0.94   ANION GAP mmol/L 4   CALCIUM mg/dL 8.9   ALBUMIN g/dL 3.8   TOTAL BILIRUBIN mg/dL 0.48   ALK PHOS U/L 45   ALT U/L 20    AST U/L 24   GLUCOSE RANDOM mg/dL 101     Results from last 7 days   Lab Units 24  1142   INR  1.08     Results from last 7 days   Lab Units 24  1015 24  0737 24  2100 24  1159   POC GLUCOSE mg/dl 243* 112 179* 245*         Results from last 7 days   Lab Units 24  1346 24  1142   LACTIC ACID mmol/L 1.4 3.0*   PROCALCITONIN ng/ml  --  <0.05       Recent Cultures (last 7 days):   Results from last 7 days   Lab Units 24  1143 24  1142   BLOOD CULTURE  Received in Microbiology Lab. Culture in Progress.  --    GRAM STAIN RESULT   --  Gram positive cocci in clusters*       Imaging Results Review: No pertinent imaging studies reviewed.  Other Study Results Review: No additional pertinent studies reviewed.    Last 24 Hours Medication List:     Current Facility-Administered Medications:     acetaminophen (TYLENOL) tablet 650 mg, Q6H PRN    [] amiodarone (CORDARONE) 900 mg in dextrose 5 % 500 mL infusion, Continuous, Last Rate: Stopped (24) **FOLLOWED BY** amiodarone (CORDARONE) 900 mg in dextrose 5 % 500 mL infusion, Continuous, Last Rate: 0.5 mg/min (24)    apixaban (ELIQUIS) tablet 5 mg, BID    atorvastatin (LIPITOR) tablet 10 mg, Daily With Dinner    insulin lispro (HumALOG/ADMELOG) 100 units/mL subcutaneous injection 1-5 Units, TID AC **AND** Fingerstick Glucose (POCT), TID AC    insulin lispro (HumALOG/ADMELOG) 100 units/mL subcutaneous injection 1-5 Units, HS    metoprolol (LOPRESSOR) injection 5 mg, Q6H PRN    metoprolol tartrate (LOPRESSOR) tablet 50 mg, Q12H PILAR    ondansetron (ZOFRAN) injection 4 mg, Q6H PRN    Administrative Statements   Today, Patient Was Seen By: Kenney Grubbs PA-C  I have spent a total time of 35 minutes in caring for this patient on the day of the visit/encounter including Diagnostic results, Instructions for management, Impressions, Counseling / Coordination of care, Documenting in the medical record,  Reviewing / ordering tests, medicine, procedures  , Obtaining or reviewing history  , and Communicating with other healthcare professionals .    **Please Note: This note may have been constructed using a voice recognition system.**

## 2024-12-13 NOTE — PROGRESS NOTES
General Cardiology   Progress Note -  Team One   Jhonny Knowles Sr. 89 y.o. male MRN: [id number]    Unit/Bed#: S -01 Encounter: [id number]    Assessment  1. Symptomatic, NS-VT / PVCs +/ questionable paroxsymal atrial fibrillation w/RVR.   -Presents to the [facility] ED today with acute onset of dizziness w/near syncope.   -ECG in the ED demonstrated ? Afib (rapid) vs AV paced rhythm w/frequent PVCs/brief runs of NSVT  -Device interrogation 12/12/2024; frequent PVCs, episodes of NS-VT, no clear atrial fibrillation.  -Telemetry review, appears to be AV pacing with frequent PVCs, intermittent bigeminy pattern.  -Outpatient rate/rhythm control; metoprolol tartrate 50 mg in the a.m. and 25 mg in the p.m.  -Inpatient rate/rhythm control; S/p IV amiodarone bolus (150 mg x 1) + IV amiodarone GTT at 0.5 mg/min + metoprolol tartrate 50 mg twice daily.  -Electrolytes -K+ 4.4, calcium 8.9, mag level 2.5  -TSH level - 1.195  -QNVDS5BCYC score = 5 (age, hypertension, vascular disease, and DM) - he was started on IV heparin GTT in the ED for systemic AC and now transition to Eliquis 5 mg twice daily.  2. CAD, hx of PCI/stenting to the LAD in 11/2002  -No recent ischemic testing for review.  -On aspirin-low-dose, statin, and BB.  3. MDT DC PPM in situ  -Device interrogation 11/12/2024; battery status 3 years, AP 94%,  100%, all lead parameters within normal limits, no significant high rate episodes, normal device function.  4. Hypertension  -Average /59 last recorded 122/52.  -Outpatient BP regimen; metoprolol tartrate 50 mg in the morning and 25 mg in the evening.  -Inpatient BP regimen; metoprolol tartrate 50 mg twice daily.  5. Dyslipidemia  -Lipid profile 10/24; cholesterol 162, triglycerides 77, HDL 44 and LDL calculated 103.  -On atorvastatin 10 mg daily.  6. DM type II  -HgbA1c 5.9.     Plan  -Pt denies any specific cardiac or respiratory complaints at this time.  No current supplemental O2 requirements.   Warm/well-perfused and euvolemic on exam.  Much more alert this a.m.  -BP currently stable.   -ECG/telemetry/device interrogation reviewed with both attending and [employer] device rep.  No clear evidence of atrial fibrillation identified, however appears to be having AV paced rhythm with occasional to frequent PVCs/intermittent bigeminy pattern with episodes of nonsustained VT.  We will reach out to our EP team to confirm these findings and/or if any device settings need to be adjusted.  For now we will continue medical treatment for this ventricular ectopy with up titration of his metoprolol to tartrate to 50 mg twice daily, had previously been on 50 mg in the a.m. and 25 mg in the p.m., dose held this a.m. and overnight -per nursing staff manual pulse was felt to be running 40-60 -likely as a result of PVCs.  Continue IV amiodarone today and we will plan to start oral amiodarone 200 mg TID and attempt for further arrhythmia suppression.  Once we confirm with our EP team that there is no clear evidence for atrial fibrillation we can then plan to discontinue Eliquis and restart low-dose aspirin if this is the case.  Continue low-dose statin therapy.  -Follow-up TTE imaging results to ensure no change in LV function or new regional wall motion abnormality.  Given his history of CAD/prior stenting now with increased burden of PVCs/NSVT he will likely need additional ischemic testing; will await results of TTE prior to determining the most appropriate study.  -Monitor on telemetry.       Subjective  Review of Systems   Constitutional: Negative for chills, fever and malaise/fatigue.   Eyes:  Negative for visual disturbance.   Cardiovascular:  Negative for chest pain, dyspnea on exertion, leg swelling, orthopnea and palpitations.   Respiratory:  Negative for cough and shortness of breath.    Gastrointestinal:  Negative for abdominal pain.   Neurological:  Negative for dizziness, headaches and light-headedness.  "      Objective:   Physical Exam  Vitals and nursing note reviewed.   Constitutional:       General: He is not in acute distress.     Appearance: He is not diaphoretic.   HENT:      Head: Normocephalic and atraumatic.   Eyes:      General: No scleral icterus.  Cardiovascular:      Rate and Rhythm: Normal rate and regular rhythm.      Pulses: Normal pulses.      Heart sounds: Normal heart sounds.   Pulmonary:      Effort: Pulmonary effort is normal.      Breath sounds: Normal breath sounds. No wheezing or rales.   Abdominal:      Palpations: Abdomen is soft.   Musculoskeletal:      Right lower leg: No edema.      Left lower leg: No edema.   Skin:     General: Skin is warm and dry.      Capillary Refill: Capillary refill takes less than 2 seconds.   Neurological:      General: No focal deficit present.      Mental Status: He is alert and oriented to person, place, and time.   Psychiatric:         Mood and Affect: Mood normal.       Vitals: Blood pressure 122/52, pulse (!) 110, temperature (!) 97.2 °F (36.2 °C), temperature source Axillary, resp. rate 16, height 5' 10\" (1.778 m), weight 71.7 kg (158 lb), SpO2 98%.,     Body mass index is 22.67 kg/m².,   Systolic (24hrs), Av , Min:108 , Max:148     Diastolic (24hrs), Av, Min:51, Max:78      Intake/Output Summary (Last 24 hours) at 2024 1106  Last data filed at 2024 1048  Gross per 24 hour   Intake 915 ml   Output 900 ml   Net 15 ml     Weight (last 2 days)       Date/Time Weight    24 1020 71.7 (158)    24 1221 72 (158.73)            LABORATORY RESULTS      CBC with diff:   Results from last 7 days   Lab Units 24  0551 24  1142 24  1141   WBC Thousand/uL 8.19 7.47  --    HEMOGLOBIN g/dL 11.3* 11.9*  --    I STAT HEMOGLOBIN g/dl  --   --  11.6*   HEMATOCRIT % 35.7* 36.5  --    HEMATOCRIT, ISTAT %  --   --  34*   MCV fL 97 95  --    PLATELETS Thousands/uL 211 272  --    RBC Million/uL 3.69* 3.83*  --    MCH pg 30.6 31.1  " "--    MCHC g/dL 31.7 32.6  --    RDW % 13.4 13.3  --    MPV fL 11.0 10.4  --    NRBC AUTO /100 WBCs  --  0  --        CMP:  Results from last 7 days   Lab Units 12/13/24  0551 12/12/24  1142 12/12/24  1141   POTASSIUM mmol/L 4.4 4.3  --    CHLORIDE mmol/L 108 105  --    CO2 mmol/L 28 24  --    CO2, I-STAT mmol/L  --   --  25   BUN mg/dL 29* 27*  --    CREATININE mg/dL 0.94 1.04  --    GLUCOSE, ISTAT mg/dl  --   --  270*   CALCIUM mg/dL 8.9 9.1  --    AST U/L 24 30  --    ALT U/L 20 21  --    ALK PHOS U/L 45 53  --    EGFR ml/min/1.73sq m 71 62  --        BMP:  Results from last 7 days   Lab Units 12/13/24  0551 12/12/24  1142 12/12/24  1141   POTASSIUM mmol/L 4.4 4.3  --    CHLORIDE mmol/L 108 105  --    CO2 mmol/L 28 24  --    CO2, I-STAT mmol/L  --   --  25   BUN mg/dL 29* 27*  --    CREATININE mg/dL 0.94 1.04  --    GLUCOSE, ISTAT mg/dl  --   --  270*   CALCIUM mg/dL 8.9 9.1  --        No results found for: \"NTBNP\"     Results from last 7 days   Lab Units 12/13/24  0551 12/12/24  1346 12/12/24  1148   MAGNESIUM mg/dL 2.5 2.7 2.1             Results from last 7 days   Lab Units 12/12/24  1523   TSH 3RD GENERATON uIU/mL 1.195       Results from last 7 days   Lab Units 12/12/24  1142   INR  1.08       Lipid Profile:   Lab Results   Component Value Date    CHOL 135 02/20/2015    CHOL 134 01/17/2014     Lab Results   Component Value Date    HDL 44 10/04/2024    HDL 41 04/08/2024    HDL 47 11/28/2023     Lab Results   Component Value Date    LDLCALC 103 (H) 10/04/2024    LDLCALC 74 04/08/2024    LDLCALC 78 11/28/2023     Lab Results   Component Value Date    TRIG 77 10/04/2024    TRIG 133 04/08/2024    TRIG 98 11/28/2023       Cardiac testing:   Results for orders placed during the hospital encounter of 02/15/18    Echo complete with contrast if indicated    [facility]  [address]  [place] [id number] [phone]    Transthoracic Echocardiogram  2D, M-mode, Doppler, and Color " Doppler    Study date:  15-Feb-2018    Patient: NOLBERTO LEVI  MR number: [id number]  Account number: [id number]  : 1934  Age: 83 years  Gender: Male  Status: Outpatient  Location: [facility]  Height: 70 in  Weight: 159.9 lb  BP: 128/ 80 mmHg    Indications: Hyperlipidemia, Essential Hypertension, Nonrheumatic Mitral Valve Insufficiency, Atherosclerotic Heart Disease of native Coronary Artery without Angina Pectoris, Cardiac Pacemaker    Diagnoses: E78.5 - Hyperlipidemia, unspecified, I10. - Essential (primary) hypertension, I25.10 - Atherosclerotic heart disease of native coronary artery without angina pectoris    Sonographer:  MIRA Burleson,RDCS  Primary Physician:  Jorge Beckfrod MD  Referring Physician:  Jorge العراقي MD  Group:  [facility]  Interpreting Physician:  Tim Sung MD    SUMMARY    LEFT VENTRICLE:  Systolic function was normal by visual assessment. Ejection fraction was estimated to be 55 %.  There were no regional wall motion abnormalities.  Doppler parameters were consistent with abnormal left ventricular relaxation (grade 1 diastolic dysfunction).    MITRAL VALVE:  There was mild regurgitation.    AORTIC VALVE:  There was mild regurgitation.    TRICUSPID VALVE:  There was trace regurgitation.    PULMONIC VALVE:  There was trace regurgitation.    HISTORY: PRIOR HISTORY: Arteriosclerosis of Coronary Artery, Hyperlipidemia, Hypertension, Mitral Regurgitation, Pacemaker    PROCEDURE: The study was performed in the [facility]. This was a routine study. The transthoracic approach was used. The study included complete 2D imaging, M-mode, complete spectral Doppler, and color Doppler. The heart rate was  88 bpm, at the start of the study. Images were obtained from the parasternal, apical, subcostal, and suprasternal notch acoustic windows. Echocardiographic views were limited due to poor acoustic window availability, decreased  penetration,  and lung interference. This was a technically difficult study.    LEFT VENTRICLE: Size was normal. Systolic function was normal by visual assessment. Ejection fraction was estimated to be 55 %. There were no regional wall motion abnormalities. Wall thickness was normal. DOPPLER: There was an increased  relative contribution of atrial contraction to ventricular filling. Doppler parameters were consistent with abnormal left ventricular relaxation (grade 1 diastolic dysfunction).    RIGHT VENTRICLE: The size was normal. Systolic function was normal. A pacing wire was present. DOPPLER: Systolic pressure was within the normal range. Estimated peak pressure was 30 mmHg.    LEFT ATRIUM: Size was normal.    RIGHT ATRIUM: Size was normal.    MITRAL VALVE: Valve structure was normal. There was normal leaflet separation. DOPPLER: The transmitral velocity was within the normal range. There was no evidence for stenosis. There was mild regurgitation.    AORTIC VALVE: The valve was trileaflet. Leaflets exhibited normal thickness and normal cuspal separation. DOPPLER: Transaortic velocity was within the normal range. There was no evidence for stenosis. There was mild regurgitation.    TRICUSPID VALVE: The valve structure was normal. There was normal leaflet separation. DOPPLER: The transtricuspid velocity was within the normal range. There was no evidence for stenosis. There was trace regurgitation.    PULMONIC VALVE: Leaflets exhibited normal thickness, no calcification, and normal cuspal separation. DOPPLER: The transpulmonic velocity was within the normal range. There was trace regurgitation.    PERICARDIUM: There was no pericardial effusion.    AORTA: The root exhibited normal size.    SYSTEMIC VEINS: IVC: The inferior vena cava was normal in size and course. Respirophasic changes were normal.    SYSTEM MEASUREMENT TABLES    2D  %FS: 34.93 %  EF(Teich): 66.39 %  IVSd: 1.4 cm  LA Diam: 3.1 cm  LVIDd: 2.56  cm  LVIDs: 1.66 cm  LVPWd: 1.35 cm    CW  TR MaxP.24 mmHg  TR Vmax: 2.3 m/s    MM  TAPSE: 2.17 cm    [facility]    Prepared and electronically signed by    Tim Sung MD  Signed 15-Feb-2018 13:09:06    No results found for this or any previous visit.    No results found for this or any previous visit.    No valid procedures specified.  No results found for this or any previous visit.      Meds/Allergies   all current active meds have been reviewed and current meds:   Current Facility-Administered Medications:     acetaminophen (TYLENOL) tablet 650 mg, Q6H PRN    [] amiodarone (CORDARONE) 900 mg in dextrose 5 % 500 mL infusion, Continuous, Last Rate: Stopped (24) **FOLLOWED BY** amiodarone (CORDARONE) 900 mg in dextrose 5 % 500 mL infusion, Continuous, Last Rate: 0.5 mg/min (24)    apixaban (ELIQUIS) tablet 5 mg, BID    atorvastatin (LIPITOR) tablet 10 mg, Daily With Dinner    insulin lispro (HumALOG/ADMELOG) 100 units/mL subcutaneous injection 1-5 Units, TID AC **AND** Fingerstick Glucose (POCT), TID AC    insulin lispro (HumALOG/ADMELOG) 100 units/mL subcutaneous injection 1-5 Units, HS    metoprolol (LOPRESSOR) injection 5 mg, Q6H PRN    metoprolol tartrate (LOPRESSOR) tablet 50 mg, Q12H PILAR    ondansetron (ZOFRAN) injection 4 mg, Q6H PRN  amiodarone (CORDARONE) 900 mg in dextrose 5 % 500 mL infusion, 0.5 mg/min, Last Rate: 0.5 mg/min (24)        Counseling / Coordination of Care  Total floor / unit time spent today 20 minutes.  Greater than 50% of total time was spent with the patient and / or family counseling and / or coordination of care.      ** Please Note: Dragon 360 Dictation voice to text software may have been used in the creation of this document. **

## 2024-12-13 NOTE — ASSESSMENT & PLAN NOTE
POA, new onset. Per pacemaker interrogation he has had about 25 minutes total of A. Fib burden since last evening around 2330  Status post Amiodarone bolus in the ED with conversion   K, Mag, Ca appropriate   Not volume overloaded   TSH normal   Patient having frequent ectopy making his heart rate seem faster on telemetry   Cardiology consult   Increase metoprolol to 50 mg BID  Currently on Amiodarone infusion   Eliquis 5 mg BID started  Trend K, Mag   Follow up ECHO  Continue telemetry

## 2024-12-14 VITALS
OXYGEN SATURATION: 96 % | HEART RATE: 62 BPM | SYSTOLIC BLOOD PRESSURE: 136 MMHG | RESPIRATION RATE: 22 BRPM | WEIGHT: 158 LBS | BODY MASS INDEX: 22.62 KG/M2 | DIASTOLIC BLOOD PRESSURE: 67 MMHG | HEIGHT: 70 IN | TEMPERATURE: 97.7 F

## 2024-12-14 PROBLEM — R78.81 POSITIVE BLOOD CULTURE: Status: ACTIVE | Noted: 2024-12-14

## 2024-12-14 LAB
GLUCOSE SERPL-MCNC: 112 MG/DL (ref 65–140)
GLUCOSE SERPL-MCNC: 191 MG/DL (ref 65–140)

## 2024-12-14 PROCEDURE — 99239 HOSP IP/OBS DSCHRG MGMT >30: CPT | Performed by: PHYSICIAN ASSISTANT

## 2024-12-14 PROCEDURE — 99232 SBSQ HOSP IP/OBS MODERATE 35: CPT | Performed by: INTERNAL MEDICINE

## 2024-12-14 PROCEDURE — 82948 REAGENT STRIP/BLOOD GLUCOSE: CPT

## 2024-12-14 RX ORDER — AMIODARONE HYDROCHLORIDE 200 MG/1
TABLET ORAL
Qty: 65 TABLET | Refills: 0 | Status: SHIPPED | OUTPATIENT
Start: 2024-12-14 | End: 2025-01-27

## 2024-12-14 RX ORDER — METOPROLOL TARTRATE 50 MG
50 TABLET ORAL EVERY 12 HOURS SCHEDULED
Qty: 60 TABLET | Refills: 0 | Status: SHIPPED | OUTPATIENT
Start: 2024-12-14

## 2024-12-14 RX ADMIN — APIXABAN 5 MG: 5 TABLET, FILM COATED ORAL at 08:19

## 2024-12-14 RX ADMIN — INSULIN LISPRO 1 UNITS: 100 INJECTION, SOLUTION INTRAVENOUS; SUBCUTANEOUS at 12:26

## 2024-12-14 RX ADMIN — AMIODARONE HYDROCHLORIDE 200 MG: 200 TABLET ORAL at 08:19

## 2024-12-14 RX ADMIN — METOPROLOL TARTRATE 50 MG: 50 TABLET, FILM COATED ORAL at 08:19

## 2024-12-14 RX ADMIN — AMIODARONE HYDROCHLORIDE 200 MG: 200 TABLET ORAL at 12:26

## 2024-12-14 NOTE — ASSESSMENT & PLAN NOTE
Lab Results   Component Value Date    HGBA1C 5.9 (H) 10/04/2024       Recent Labs     12/13/24  1531 12/13/24  2157 12/14/24  0734 12/14/24  1040   POCGLU 141* 165* 112 191*       Blood Sugar Average: Last 72 hrs:  (P) 173.5  Recent A1c acceptable. Hyperglycemic on admission   Restart home regimen

## 2024-12-14 NOTE — PROGRESS NOTES
[facility]    Cardiology Progress Note  Name: Jhonny Knowles Sr. 89 y.o. male I MRN: [id number]  Unit/Bed#: ROLANDO JACOBSEN 310-01 I Date of Admission: 12/12/2024   Date of Service: 12/14/2024 I Hospital Day: 2       Subjective:   He feels much better today, and is free of any dizziness.  No shortness of breath while at rest or with limited ambulation.    Assessments  89-year-old gentleman with history of CAD status post remote stent to LAD, cardiac pacemaker in situ, hypertension, hyperlipidemia, presented to the hospital with sudden onset symptoms of shortness of breath and dizziness which started on the day prior.  He rested that night and O2 improvement, but next morning he was still feeling quite dizzy.  When he was walking out around the house, he became very dizzy and p.o. and had to sit down.  His wife called EMS and he was noted to be quite dizzy, and found to have runs of wide complex tachycardia (possible V. Tach) on EMS monitor.  He was subsequently brought into the ED for evaluation, and EKG had concerns about possible acute MI and as a result I was contacted by the ED physician this morning.  After review of EKG, he appeared to have atrial fibrillation with aberrant conduction and occasional PVCs.  He received a dose of IV amiodarone, and reportedly converted back to sinus rhythm and so this was apparently stopped.      Principal Problem:    Atrial fibrillation with RVR (HCC)  Active Problems:    CAD (coronary artery disease)    Hypertension    Type 2 diabetes mellitus (HCC)    SSS (sick sinus syndrome) (HCC)    Elevated troponin level not due to acute coronary syndrome    Assessment:  New onset atrial fibrillation with RVR  Frequent PVCs   CAD status post remote PCI to LAD  Non-MI troponin elevation   PVCs  Medtronic dual-chamber pacemaker in situ  Hypertension  Hyperlipidemia  Diabetes mellitus type 2     Plan:  Presenting symptoms of shortness of breath and dizziness appear to be related to  "wide complex tachycardia (rapid atrial fibrillation + frequent PVCs) resulting in poor perfusion  Device interrogation was reviewed with EP yesterday    Although most of this appears to be ventricular paced rhythm with frequent PVCs, small.  Of atrial fibrillation cannot be completely excluded based on the EKG  Echocardiogram done and personally reviewed  EF normal 65%, moderate MR, moderate pulm hypertension   Now maintaining AV paced rhythm, no significant ectopy after having been on amiodarone  Continue metoprolol 50 mg twice daily  Will plan to continue PO amiodarone  load --> 200 mg daily for 1 week, 20 mg twice daily for 1 week, and then 20 mg daily , for both possible A-fib and PVCs  Continue Eliquis 5 mg twice daily  If he does continue to have some exertional shortness of breath, then he will benefit from outpatient nuclear Lexiscan stress test to evaluate for ischemia  If continued issues with same, he would need EP referral to assist further with this    Stable for discharge from cardiac perspective.    Review of Systems   All other systems reviewed and are negative.    Discussed with primary medical service Kenney Grubbs PA-C    Telemetry Review: Now AV dual paced rhythm.  No significant ectopy currently or in recent hours.  Previously had a lot of PVCs yesterday    Objective:   Vitals: Blood pressure 161/80, pulse 67, temperature 97.7 °F (36.5 °C), temperature source Oral, resp. rate 16, height 5' 10\" (1.778 m), weight 71.7 kg (158 lb), SpO2 96%., Body mass index is 22.67 kg/m².,   Orthostatic Blood Pressures      Flowsheet Row Most Recent Value   Blood Pressure 161/80 filed at 2024 0722   Patient Position - Orthostatic VS Lying filed at 2024 0722           Systolic (24hrs), Av , Min:104 , Max:161     Diastolic (24hrs), Av, Min:52, Max:80    Wt Readings from Last 5 Encounters:   24 71.7 kg (158 lb)   10/18/24 69.6 kg (153 lb 8 oz)   24 69.9 kg (154 lb 1.6 oz)   24 " 72.1 kg (159 lb)   02/05/24 72.7 kg (160 lb 4.8 oz)     I/O         12/12 0701  12/13 0700 12/13 0701  12/14 0700 12/14 0701  12/15 0700    P.O. 220 440 60    IV Piggyback 375      Total Intake(mL/kg) 595 (8.3) 440 (6.1) 60 (0.8)    Urine (mL/kg/hr) 575 775 (0.5) 850 (5.4)    Stool 0 0     Total Output 575 775 850    Net +20 -335 -790           Unmeasured Stool Occurrence 0 x 0 x                 Physical Exam  Vitals and nursing note reviewed.   Constitutional:       General: He is not in acute distress.     Appearance: He is well-developed. He is not ill-appearing or diaphoretic.   HENT:      Head: Normocephalic and atraumatic.      Nose: No congestion.   Eyes:      General: No scleral icterus.     Conjunctiva/sclera: Conjunctivae normal.   Neck:      Vascular: No carotid bruit or JVD.   Cardiovascular:      Rate and Rhythm: Normal rate and regular rhythm.      Heart sounds: Normal heart sounds. No murmur heard.     No friction rub. No gallop.      Comments: Left upper chest wall cardiac device in-situ, no swelling/tenderness  Pulmonary:      Effort: Pulmonary effort is normal. No respiratory distress.      Breath sounds: Normal breath sounds. No wheezing or rales.   Chest:      Chest wall: No tenderness.   Abdominal:      General: There is no distension.      Palpations: Abdomen is soft.      Tenderness: There is no abdominal tenderness.   Musculoskeletal:         General: No swelling, tenderness or deformity.      Cervical back: Neck supple. No muscular tenderness.      Right lower leg: No edema.      Left lower leg: No edema.   Skin:     General: Skin is warm.   Neurological:      General: No focal deficit present.      Mental Status: He is alert and oriented to person, place, and time. Mental status is at baseline.   Psychiatric:         Mood and Affect: Mood normal.         Behavior: Behavior normal.         Thought Content: Thought content normal.         Laboratory Results: personally reviewed        CBC  with diff:   Results from last 7 days   Lab Units 12/13/24  0551 12/12/24  1142 12/12/24  1141   WBC Thousand/uL 8.19 7.47  --    HEMOGLOBIN g/dL 11.3* 11.9*  --    I STAT HEMOGLOBIN g/dl  --   --  11.6*   HEMATOCRIT % 35.7* 36.5  --    HEMATOCRIT, ISTAT %  --   --  34*   MCV fL 97 95  --    PLATELETS Thousands/uL 211 272  --    RBC Million/uL 3.69* 3.83*  --    MCH pg 30.6 31.1  --    MCHC g/dL 31.7 32.6  --    RDW % 13.4 13.3  --    MPV fL 11.0 10.4  --    NRBC AUTO /100 WBCs  --  0  --          CMP:  Results from last 7 days   Lab Units 12/13/24  0551 12/12/24  1142 12/12/24  1141   POTASSIUM mmol/L 4.4 4.3  --    CHLORIDE mmol/L 108 105  --    CO2 mmol/L 28 24  --    CO2, I-STAT mmol/L  --   --  25   BUN mg/dL 29* 27*  --    CREATININE mg/dL 0.94 1.04  --    GLUCOSE, ISTAT mg/dl  --   --  270*   CALCIUM mg/dL 8.9 9.1  --    AST U/L 24 30  --    ALT U/L 20 21  --    ALK PHOS U/L 45 53  --    EGFR ml/min/1.73sq m 71 62  --          BMP:  Results from last 7 days   Lab Units 12/13/24  0551 12/12/24  1142 12/12/24  1141   POTASSIUM mmol/L 4.4 4.3  --    CHLORIDE mmol/L 108 105  --    CO2 mmol/L 28 24  --    CO2, I-STAT mmol/L  --   --  25   BUN mg/dL 29* 27*  --    CREATININE mg/dL 0.94 1.04  --    GLUCOSE, ISTAT mg/dl  --   --  270*   CALCIUM mg/dL 8.9 9.1  --        BNP:   Recent Labs     12/12/24  1149   *       Magnesium:   Results from last 7 days   Lab Units 12/13/24  0551 12/12/24  1346 12/12/24  1148   MAGNESIUM mg/dL 2.5 2.7 2.1       Coags:   Results from last 7 days   Lab Units 12/12/24  1849 12/12/24  1142   PTT seconds 50* 26   INR   --  1.08       TSH:        Hemoglobin A1C       Lipid Profile:       Meds/Allergies   all current active meds have been reviewed and current meds:   Current Facility-Administered Medications:     acetaminophen (TYLENOL) tablet 650 mg, Q6H PRN    amiodarone tablet 200 mg, TID With Meals    apixaban (ELIQUIS) tablet 5 mg, BID    atorvastatin (LIPITOR) tablet 10 mg,  Daily With Dinner    insulin lispro (HumALOG/ADMELOG) 100 units/mL subcutaneous injection 1-5 Units, TID AC **AND** Fingerstick Glucose (POCT), TID AC    insulin lispro (HumALOG/ADMELOG) 100 units/mL subcutaneous injection 1-5 Units, HS    metoprolol (LOPRESSOR) injection 5 mg, Q6H PRN    metoprolol tartrate (LOPRESSOR) tablet 50 mg, Q12H PILAR    ondansetron (ZOFRAN) injection 4 mg, Q6H PRN    Medications Prior to Admission:     acetaminophen (TYLENOL) 325 mg tablet    aspirin 81 MG tablet    atorvastatin (LIPITOR) 10 mg tablet    cephalexin (KEFLEX) 500 mg capsule    glimepiride (AMARYL) 1 mg tablet    glucose blood (OneTouch Ultra) test strip    Lancets (OneTouch Delica Plus Iztidi92C) MISC    leuprolide (Eligard) 22.5 mg    metoprolol tartrate (LOPRESSOR) 25 mg tablet    metoprolol tartrate (LOPRESSOR) 50 mg tablet    Multiple Vitamins-Minerals (MULTIVITAMIN ADULT PO)         Cardiac testing: reviewed  Results for orders placed during the hospital encounter of 02/15/18    Echo complete with contrast if indicated    [facility]  [address] [place] [address] [phone]    Transthoracic Echocardiogram  2D, M-mode, Doppler, and Color Doppler    Study date:  15-Feb-2018    Patient: NOLBERTO LEVI  MR number: [id number]  Account number: [id number]  : 1934  Age: 83 years  Gender: Male  Status: Outpatient  Location: [facility]  Height: 70 in  Weight: 159.9 lb  BP: 128/ 80 mmHg    Indications: Hyperlipidemia, Essential Hypertension, Nonrheumatic Mitral Valve Insufficiency, Atherosclerotic Heart Disease of native Coronary Artery without Angina Pectoris, Cardiac Pacemaker    Diagnoses: E78.5 - Hyperlipidemia, unspecified, I10. - Essential (primary) hypertension, I25.10 - Atherosclerotic heart disease of native coronary artery without angina pectoris    Sonographer:  MIRA Burleson,RDCS  Primary Physician:  Jorge Beckford MD  Referring Physician:  Jorge العراقي,  MD  Group:  [facility]  Interpreting Physician:  Tim Sung MD    SUMMARY    LEFT VENTRICLE:  Systolic function was normal by visual assessment. Ejection fraction was estimated to be 55 %.  There were no regional wall motion abnormalities.  Doppler parameters were consistent with abnormal left ventricular relaxation (grade 1 diastolic dysfunction).    MITRAL VALVE:  There was mild regurgitation.    AORTIC VALVE:  There was mild regurgitation.    TRICUSPID VALVE:  There was trace regurgitation.    PULMONIC VALVE:  There was trace regurgitation.    HISTORY: PRIOR HISTORY: Arteriosclerosis of Coronary Artery, Hyperlipidemia, Hypertension, Mitral Regurgitation, Pacemaker    PROCEDURE: The study was performed in the [facility]. This was a routine study. The transthoracic approach was used. The study included complete 2D imaging, M-mode, complete spectral Doppler, and color Doppler. The heart rate was  88 bpm, at the start of the study. Images were obtained from the parasternal, apical, subcostal, and suprasternal notch acoustic windows. Echocardiographic views were limited due to poor acoustic window availability, decreased penetration,  and lung interference. This was a technically difficult study.    LEFT VENTRICLE: Size was normal. Systolic function was normal by visual assessment. Ejection fraction was estimated to be 55 %. There were no regional wall motion abnormalities. Wall thickness was normal. DOPPLER: There was an increased  relative contribution of atrial contraction to ventricular filling. Doppler parameters were consistent with abnormal left ventricular relaxation (grade 1 diastolic dysfunction).    RIGHT VENTRICLE: The size was normal. Systolic function was normal. A pacing wire was present. DOPPLER: Systolic pressure was within the normal range. Estimated peak pressure was 30 mmHg.    LEFT ATRIUM: Size was normal.    RIGHT ATRIUM: Size was normal.    MITRAL VALVE: Valve  structure was normal. There was normal leaflet separation. DOPPLER: The transmitral velocity was within the normal range. There was no evidence for stenosis. There was mild regurgitation.    AORTIC VALVE: The valve was trileaflet. Leaflets exhibited normal thickness and normal cuspal separation. DOPPLER: Transaortic velocity was within the normal range. There was no evidence for stenosis. There was mild regurgitation.    TRICUSPID VALVE: The valve structure was normal. There was normal leaflet separation. DOPPLER: The transtricuspid velocity was within the normal range. There was no evidence for stenosis. There was trace regurgitation.    PULMONIC VALVE: Leaflets exhibited normal thickness, no calcification, and normal cuspal separation. DOPPLER: The transpulmonic velocity was within the normal range. There was trace regurgitation.    PERICARDIUM: There was no pericardial effusion.    AORTA: The root exhibited normal size.    SYSTEMIC VEINS: IVC: The inferior vena cava was normal in size and course. Respirophasic changes were normal.    SYSTEM MEASUREMENT TABLES    2D  %FS: 34.93 %  EF(Teich): 66.39 %  IVSd: 1.4 cm  LA Diam: 3.1 cm  LVIDd: 2.56 cm  LVIDs: 1.66 cm  LVPWd: 1.35 cm    CW  TR MaxP.24 mmHg  TR Vmax: 2.3 m/s    MM  TAPSE: 2.17 cm    [employer] Accredited Echocardiography Laboratory    Prepared and electronically signed by    Tim Sung MD  Signed 15-Feb-2018 13:09:06    No results found for this or any previous visit.    No results found for this or any previous visit.    No results found for this or any previous visit.

## 2024-12-14 NOTE — ASSESSMENT & PLAN NOTE
Status post pacemaker  Interrogation here shows about 25 minutes total of A. Fib burden since 2330 last evening   Plan as primary problem   No device adjustments made

## 2024-12-14 NOTE — PLAN OF CARE
Problem: SAFETY ADULT  Goal: Patient will remain free of falls  Description: INTERVENTIONS:  - Educate patient/family on patient safety including physical limitations  - Instruct patient to call for assistance with activity   - Consult OT/PT to assist with strengthening/mobility   - Keep Call bell within reach  - Keep bed low and locked with side rails adjusted as appropriate  - Keep care items and personal belongings within reach  - Initiate and maintain comfort rounds  - Make Fall Risk Sign visible to staff  - Offer Toileting every  Hours, in advance of need  - Initiate/Maintain alarm  - Obtain necessary fall risk management equipment:   - Apply yellow socks and bracelet for high fall risk patients  - Consider moving patient to room near nurses station  Outcome: Completed  Goal: Maintain or return to baseline ADL function  Description: INTERVENTIONS:  -  Assess patient's ability to carry out ADLs; assess patient's baseline for ADL function and identify physical deficits which impact ability to perform ADLs (bathing, care of mouth/teeth, toileting, grooming, dressing, etc.)  - Assess/evaluate cause of self-care deficits   - Assess range of motion  - Assess patient's mobility; develop plan if impaired  - Assess patient's need for assistive devices and provide as appropriate  - Encourage maximum independence but intervene and supervise when necessary  - Involve family in performance of ADLs  - Assess for home care needs following discharge   - Consider OT consult to assist with ADL evaluation and planning for discharge  - Provide patient education as appropriate  Outcome: Completed  Goal: Maintains/Returns to pre admission functional level  Description: INTERVENTIONS:  - Perform AM-PAC 6 Click Basic Mobility/ Daily Activity assessment daily.  - Set and communicate daily mobility goal to care team and patient/family/caregiver.   - Collaborate with rehabilitation services on mobility goals if consulted  - Perform  Range of Motion  times a day.  - Reposition patient every  hours.  - Dangle patient  times a day  - Stand patient  times a day  - Ambulate patient  times a day  - Out of bed to chair  times a day   - Out of bed for meals  times a day  - Out of bed for toileting  - Record patient progress and toleration of activity level   Outcome: Completed

## 2024-12-14 NOTE — CASE MANAGEMENT
Case Management Discharge Planning Note    Patient name Jhonny Knowles .  Location S /S -01 MRN [id number]  : 1934 Date 2024       Current Admission Date: 2024  Current Admission Diagnosis:Atrial fibrillation with RVR (HCC)   Patient Active Problem List    Diagnosis Date Noted Date Diagnosed    Positive blood culture 2024     Atrial fibrillation with RVR ([facility]) 2024     Elevated troponin level not due to acute coronary syndrome 2024     SSS (sick sinus syndrome) ([facility]) 2019     Aortic valve regurgitation 2017     Mitral regurgitation 2017     Carpal tunnel syndrome 2015     Hypertension 2015     Prostate cancer ([facility]) 2014     CAD (coronary artery disease) 2013     Disc degeneration, lumbar 2013     Facet arthritis of lumbar region 2013     Glaucoma 2013     Hyperlipidemia 2013     Lumbar canal stenosis 2013     Type 2 diabetes mellitus (HCC) 2013       LOS (days): 2  Geometric Mean LOS (GMLOS) (days): 1.8  Days to GMLOS:-0.3     OBJECTIVE:  Risk of Unplanned Readmission Score: 14.12         Current admission status: Inpatient   Preferred Pharmacy:   [id number] - [place] - [address]  [address]  [place] [address]  Phone: [phone] Fax: [phone]    [facility] - [place], [place] - [facility] [address]  [address] [address]  Phone: [phone] Fax: [phone]    [facility]  [address]  [facility] [address]  Phone: [phone] Fax: [phone]    Primary Care Provider: Jorge Beckford MD    Primary Insurance: MEDICARE  Secondary Insurance: [facility]    DISCHARGE DETAILS:  CM contacted patient's [facility] pharmacy at . Spoke with pharmacist. Patient's Eliquis copay is $373.94. Provided a 30 day free coupon to pharmacist over the phone. Patient's 30 day free Eliquis went through  and pharmacy working to fill it today.     SLIM and nursing updated.

## 2024-12-14 NOTE — ASSESSMENT & PLAN NOTE
Noted at 62, did not have chest pain throughout entire event. Suspect due to tachy-arrhythmia   4 hour troponin elevated to 227  ECHO without RWMA   Stable for discharge  Cardiology follow up

## 2024-12-14 NOTE — ASSESSMENT & PLAN NOTE
Prior PCI to LAD in 2002. No chest pain   Troponin elevating in the setting of tachycardia on day of admission   ECHO reassuring with no wall motion abnormality and preserved EF  Continue ASA, Lipitor   Increase Metoprolol to 50 mg BID

## 2024-12-14 NOTE — ASSESSMENT & PLAN NOTE
POA, new onset. Per pacemaker interrogation he has had about 25 minutes total of A. Fib burden since last evening around 2330  Status post Amiodarone bolus in the ED with conversion   K, Mag, Ca appropriate   Not volume overloaded   TSH normal   Patient having frequent ectopy making his heart rate seem faster on telemetry   Telemetry reassuring, patient ambulated without symptoms and is feeling well   Cardiology consult appreciated   Amiodarone taper - 200 mg TID x 7 days, 200 mg BID x 7 days then 200 mg QD   Increase metoprolol to 50 mg BID   Eliquis 5 mg BID started  CM to provide coupon   Close follow up with Cardiology   Stable for discharge

## 2024-12-14 NOTE — PLAN OF CARE
Problem: SAFETY ADULT  Goal: Patient will remain free of falls  Description: INTERVENTIONS:  - Educate patient/family on patient safety including physical limitations  - Instruct patient to call for assistance with activity   - Consult OT/PT to assist with strengthening/mobility   - Keep Call bell within reach  - Keep bed low and locked with side rails adjusted as appropriate  - Keep care items and personal belongings within reach  - Initiate and maintain comfort rounds  - Make Fall Risk Sign visible to staff  - Offer Toileting every 2 Hours, in advance of need  - Initiate/Maintain bed alarm  - Obtain necessary fall risk management equipment: bed alarm  - Apply yellow socks and bracelet for high fall risk patients  - Consider moving patient to room near nurses station  Outcome: Progressing  Goal: Maintain or return to baseline ADL function  Description: INTERVENTIONS:  -  Assess patient's ability to carry out ADLs; assess patient's baseline for ADL function and identify physical deficits which impact ability to perform ADLs (bathing, care of mouth/teeth, toileting, grooming, dressing, etc.)  - Assess/evaluate cause of self-care deficits   - Assess range of motion  - Assess patient's mobility; develop plan if impaired  - Assess patient's need for assistive devices and provide as appropriate  - Encourage maximum independence but intervene and supervise when necessary  - Involve family in performance of ADLs  - Assess for home care needs following discharge   - Consider OT consult to assist with ADL evaluation and planning for discharge  - Provide patient education as appropriate  Outcome: Progressing  Goal: Maintains/Returns to pre admission functional level  Description: INTERVENTIONS:  - Perform AM-PAC 6 Click Basic Mobility/ Daily Activity assessment daily.  - Set and communicate daily mobility goal to care team and patient/family/caregiver.   - Collaborate with rehabilitation services on mobility goals if  consulted  - Perform Range of Motion 3 times a day.  - Reposition patient every 2 hours.  - Dangle patient 3 times a day  - Stand patient 3 times a day  - Ambulate patient 3 times a day  - Out of bed to chair 3 times a day   - Out of bed for meals 3 times a day  - Out of bed for toileting  - Record patient progress and toleration of activity level   Outcome: Progressing

## 2024-12-14 NOTE — DISCHARGE SUMMARY
Discharge Summary - Hospitalist   Name: Jhonny Knowles Sr. 89 y.o. male I MRN: [id number]  Unit/Bed#: S -01 I Date of Admission: 12/12/2024   Date of Service: 12/14/2024 I Hospital Day: 2     Assessment & Plan  Atrial fibrillation with RVR (HCC)  POA, new onset. Per pacemaker interrogation he has had about 25 minutes total of A. Fib burden since last evening around 2330  Status post Amiodarone bolus in the ED with conversion   K, Mag, Ca appropriate   Not volume overloaded   TSH normal   Patient having frequent ectopy making his heart rate seem faster on telemetry   Telemetry reassuring, patient ambulated without symptoms and is feeling well   Cardiology consult appreciated   Amiodarone taper - 200 mg TID x 7 days, 200 mg BID x 7 days then 200 mg QD   Increase metoprolol to 50 mg BID   Eliquis 5 mg BID started  CM to provide coupon   Close follow up with Cardiology   Stable for discharge   SSS (sick sinus syndrome) (HCC)  Status post pacemaker  Interrogation here shows about 25 minutes total of A. Fib burden since 2330 last evening   Plan as primary problem   No device adjustments made   Elevated troponin level not due to acute coronary syndrome  Noted at 62, did not have chest pain throughout entire event. Suspect due to tachy-arrhythmia   4 hour troponin elevated to 227  ECHO without RWMA   Stable for discharge  Cardiology follow up   CAD (coronary artery disease)  Prior PCI to LAD in 2002. No chest pain   Troponin elevating in the setting of tachycardia on day of admission   ECHO reassuring with no wall motion abnormality and preserved EF  Continue ASA, Lipitor   Increase Metoprolol to 50 mg BID     Hypertension  BP acceptable   Increase Metoprolol to 50 mg BID   Monitor closely   Type 2 diabetes mellitus (HCC)  Lab Results   Component Value Date    HGBA1C 5.9 (H) 10/04/2024       Recent Labs     12/13/24  1531 12/13/24  2157 12/14/24  0734 12/14/24  1040   POCGLU 141* 165* 112 191*       Blood Sugar Average:  Last 72 hrs:  (P) 173.5  Recent A1c acceptable. Hyperglycemic on admission   Restart home regimen   Positive blood culture  1/2 BCx positive for staph epi, likely contaminant   No antibiotics needed      Medical Problems       Resolved Problems  Date Reviewed: 12/14/2024   None       Discharging Physician / Practitioner: Kenney Grubbs PA-C  PCP: Jorge Beckford MD  Admission Date:   Admission Orders (From admission, onward)       Ordered        12/12/24 1228  INPATIENT ADMISSION  Once                          Discharge Date: 12/14/24    Consultations During Hospital Stay:  Cardiology - Dr. Powell    Procedures Performed:   CXR  ECHO    Significant Findings / Test Results:   CXR: No acute pulmonary disease  ECHO: EF = 70%, systolic function normal, wall motion normal, diastolic function abnormal     Incidental Findings:   None     Test Results Pending at Discharge (will require follow up):   None     Outpatient Tests Requested:  None    Complications:  none    Reason for Admission: SOB, Dizziness    Hospital Course:   Jhonny Knowles Sr. is a 89 y.o. male patient who originally presented to the hospital on 12/12/2024 due to dizziness and SOB. Patient was found to be in what was thought to be rapid A. Fib. He received Amiodarone bolus in the ED and his heart rate improved. He was noted to have an elevated troponin as well. Cardiology was consulted and he was admitted. He was maintained on Amiodarone infusion, heparin drip was started, and pacemaker was interrogated. Pacemaker interrogation showed about 25 minutes total of A. Fib burden. Patient was then noted to have frequent ectopy on telemetry. Lopressor was increased to 50 mg BID. His troponin elevated further, but his ECHO had no RWMA and he never had chest pain so this was felt to be due to frequent ectopy/tachyarrhythmia. With increase in Lopressor and addition of Amiodarone that was eventually changed to PO his heart rates improved, ectopy resolved, and  "symptoms were palliated. He was deemed stable for discharge on increased Lopressor, Amiodarone taper, and Eliquis for anticoagulation. He will follow up closely with cardiology. Of note, 1/2 blood cultures were positive on admission for Staph epi. He did not have suspicion for infection and this is likely a contamination.     Hospital Course: No notes on file      Please see above list of diagnoses and related plan for additional information.     Condition at Discharge: stable    Discharge Day Visit / Exam:   Subjective:  Patient reports feeling well today. Reports he ambulated with staff without SOB or dizziness. He is excited to go home.   Vitals: Blood Pressure: 136/67 (12/14/24 1036)  Pulse: 62 (12/14/24 1036)  Temperature: 97.7 °F (36.5 °C) (12/14/24 1036)  Temp Source: Oral (12/14/24 1037)  Respirations: 22 (12/14/24 1037)  Height: 5' 10\" (177.8 cm) (12/13/24 1020)  Weight - Scale: 71.7 kg (158 lb) (12/13/24 1020)  SpO2: 96 % (12/14/24 1036)  Physical Exam  Vitals and nursing note reviewed.   Constitutional:       General: He is not in acute distress.     Appearance: Normal appearance. He is normal weight. He is not ill-appearing or diaphoretic.   HENT:      Head: Normocephalic and atraumatic.      Mouth/Throat:      Mouth: Mucous membranes are moist.   Eyes:      General: No scleral icterus.     Pupils: Pupils are equal, round, and reactive to light.   Cardiovascular:      Rate and Rhythm: Normal rate and regular rhythm.      Pulses: Normal pulses.      Heart sounds: Normal heart sounds, S1 normal and S2 normal. No murmur heard.     No systolic murmur is present.      No diastolic murmur is present.      No gallop. No S3 or S4 sounds.   Pulmonary:      Effort: Pulmonary effort is normal. No accessory muscle usage or respiratory distress.      Breath sounds: Normal breath sounds. No stridor. No decreased breath sounds, wheezing, rhonchi or rales.   Chest:      Chest wall: No tenderness.   Abdominal:      " General: Bowel sounds are normal. There is no distension.      Palpations: Abdomen is soft.      Tenderness: There is no abdominal tenderness. There is no guarding.   Musculoskeletal:      Right lower leg: No edema.      Left lower leg: No edema.   Skin:     General: Skin is warm and dry.      Coloration: Skin is not jaundiced.   Neurological:      General: No focal deficit present.      Mental Status: He is alert. Mental status is at baseline.      Motor: No tremor or seizure activity.   Psychiatric:         Behavior: Behavior is cooperative.          Discussion with Family: Updated  (wife) at bedside.    Discharge instructions/Information to patient and family:   See after visit summary for information provided to patient and family.      Provisions for Follow-Up Care:  See after visit summary for information related to follow-up care and any pertinent home health orders.      Mobility at time of Discharge:   Basic Mobility Inpatient Raw Score: 17  JH-HLM Goal: 5: Stand one or more mins  JH-HLM Achieved: 6: Walk 10 steps or more  HLM Goal achieved. Continue to encourage appropriate mobility.     Disposition:   Home    Planned Readmission: None    Discharge Medications:  See after visit summary for reconciled discharge medications provided to patient and/or family.      Administrative Statements   Discharge Statement:  I have spent a total time of 45 minutes in caring for this patient on the day of the visit/encounter. >30 minutes of time was spent on: Diagnostic results, Instructions for management, Impressions, Counseling / Coordination of care, Documenting in the medical record, and Reviewing / ordering tests, medicine, procedures  .    **Please Note: This note may have been constructed using a voice recognition system**

## 2024-12-15 LAB
ATRIAL RATE: 105 BPM
ATRIAL RATE: 159 BPM
ATRIAL RATE: 38 BPM
ATRIAL RATE: 50 BPM
ATRIAL RATE: 56 BPM
ATRIAL RATE: 60 BPM
ATRIAL RATE: 68 BPM
BACTERIA BLD CULT: ABNORMAL
GRAM STN SPEC: ABNORMAL
MECA+MECC ISLT/SPM QL: DETECTED
P AXIS: -49 DEGREES
P AXIS: 84 DEGREES
PR INTERVAL: 120 MS
PR INTERVAL: 224 MS
PR INTERVAL: 248 MS
QRS AXIS: -19 DEGREES
QRS AXIS: -20 DEGREES
QRS AXIS: -23 DEGREES
QRS AXIS: -25 DEGREES
QRS AXIS: -26 DEGREES
QRS AXIS: -29 DEGREES
QRS AXIS: 3 DEGREES
QRSD INTERVAL: 102 MS
QRSD INTERVAL: 104 MS
QRSD INTERVAL: 116 MS
QRSD INTERVAL: 118 MS
QRSD INTERVAL: 118 MS
QRSD INTERVAL: 124 MS
QRSD INTERVAL: 88 MS
QT INTERVAL: 244 MS
QT INTERVAL: 258 MS
QT INTERVAL: 322 MS
QT INTERVAL: 322 MS
QT INTERVAL: 362 MS
QT INTERVAL: 366 MS
QT INTERVAL: 394 MS
QTC INTERVAL: 340 MS
QTC INTERVAL: 340 MS
QTC INTERVAL: 466 MS
QTC INTERVAL: 466 MS
QTC INTERVAL: 479 MS
QTC INTERVAL: 491 MS
QTC INTERVAL: 552 MS
S EPIDERMIDIS DNA BLD POS QL NAA+NON-PRB: DETECTED
T WAVE AXIS: 127 DEGREES
T WAVE AXIS: 177 DEGREES
T WAVE AXIS: 184 DEGREES
T WAVE AXIS: 212 DEGREES
T WAVE AXIS: 215 DEGREES
T WAVE AXIS: 223 DEGREES
T WAVE AXIS: 230 DEGREES
VENTRICULAR RATE: 100 BPM
VENTRICULAR RATE: 105 BPM
VENTRICULAR RATE: 117 BPM
VENTRICULAR RATE: 126 BPM
VENTRICULAR RATE: 137 BPM
VENTRICULAR RATE: 140 BPM
VENTRICULAR RATE: 89 BPM

## 2024-12-15 PROCEDURE — 93010 ELECTROCARDIOGRAM REPORT: CPT | Performed by: INTERNAL MEDICINE

## 2024-12-16 ENCOUNTER — TELEPHONE (OUTPATIENT)
Dept: FAMILY MEDICINE CLINIC | Facility: CLINIC | Age: 89
End: 2024-12-16

## 2024-12-17 LAB — BACTERIA BLD CULT: NORMAL

## 2024-12-18 ENCOUNTER — TRANSITIONAL CARE MANAGEMENT (OUTPATIENT)
Dept: FAMILY MEDICINE CLINIC | Facility: CLINIC | Age: 89
End: 2024-12-18

## 2024-12-19 ENCOUNTER — OFFICE VISIT (OUTPATIENT)
Dept: FAMILY MEDICINE CLINIC | Facility: CLINIC | Age: 89
End: 2024-12-19
Payer: MEDICARE

## 2024-12-19 VITALS
HEIGHT: 70 IN | TEMPERATURE: 98 F | RESPIRATION RATE: 18 BRPM | OXYGEN SATURATION: 100 % | WEIGHT: 155 LBS | DIASTOLIC BLOOD PRESSURE: 80 MMHG | SYSTOLIC BLOOD PRESSURE: 132 MMHG | HEART RATE: 68 BPM | BODY MASS INDEX: 22.19 KG/M2

## 2024-12-19 DIAGNOSIS — R79.89 ELEVATED TROPONIN LEVEL NOT DUE TO ACUTE CORONARY SYNDROME: ICD-10-CM

## 2024-12-19 DIAGNOSIS — I48.91 ATRIAL FIBRILLATION WITH RVR (HCC): Primary | ICD-10-CM

## 2024-12-19 DIAGNOSIS — I49.5 SSS (SICK SINUS SYNDROME) (HCC): ICD-10-CM

## 2024-12-19 PROCEDURE — 99496 TRANSJ CARE MGMT HIGH F2F 7D: CPT

## 2024-12-19 NOTE — PROGRESS NOTES
"Transition of Care Visit  Name: Jhonny Knowles Sr.      : 1934      MRN: [id number]  Encounter Provider: Pauline Vargas DO  Encounter Date: 2024   Encounter department: [facility]    Assessment & Plan  Atrial fibrillation with RVR (HCC)         SSS (sick sinus syndrome) (HCC)         Elevated troponin level not due to acute coronary syndrome            Admitted  -  for new onset A-fib with RVR cardiology consulted-metoprolol increased to 50 mg twice daily, started on Eliquis 5 mg twice daily, amiodarone taper -tolerating well.  Patient has follow-up with cardiology 2025.  Currently asymptomatic and doing well since discharge.  All medication refills to be sent via [facility].    History of Present Illness     Transitional Care Management Review:   Jhonny Knowles Sr. is a 89 y.o. male here for TCM follow up. Per hospital discharge summary:    \"Patient ooriginally presented to the hospital on 2024 due to dizziness and SOB. Patient was found to be in what was thought to be rapid A. Fib. He received Amiodarone bolus in the ED and his heart rate improved. He was noted to have an elevated troponin as well. Cardiology was consulted and he was admitted. He was maintained on Amiodarone infusion, heparin drip was started, and pacemaker was interrogated. Pacemaker interrogation showed about 25 minutes total of A. Fib burden. Patient was then noted to have frequent ectopy on telemetry. Lopressor was increased to 50 mg BID. His troponin elevated further, but his ECHO had no RWMA and he never had chest pain so this was felt to be due to frequent ectopy/tachyarrhythmia. With increase in Lopressor and addition of Amiodarone that was eventually changed to PO his heart rates improved, ectopy resolved, and symptoms were palliated. He was deemed stable for discharge on increased Lopressor, Amiodarone taper, and Eliquis for anticoagulation. He will follow up closely with " "cardiology. Of note, 1/2 blood cultures were positive on admission for Staph epi. He did not have suspicion for infection and this is likely a contamination. \"    Since discharge, patient has been doing well.  Denies any shortness of breath with exertion, chest pain, palpitations.  Patient was wife did report that he fell onto his bottom on Sunday night and bumped his head but denied any overt pain/injury.  He does ambulate with a cane/walker.    During the TCM phone call patient stated:  TCM Call       Date and time call was made  12/16/2024  2:33 PM    Hospital care reviewed  Records reviewed    Patient was hospitialized at  [facility]    Date of Admission  12/12/24    Date of discharge  12/14/24    Diagnosis  atrial fibrillation with RVR    Disposition  Home    Were the patients medications reviewed and updated  Yes    Current Symptoms  None          TCM Call       Post hospital issues  None    Should patient be enrolled in anticoag monitoring?  No    Scheduled for follow up?  Yes    Did you obtain your prescribed medications  Yes    Do you need help managing your prescriptions or medications  No    Is transportation to your appointment needed  No    I have advised the patient to call PCP with any new or worsening symptoms  Guicho Harvey MA    Living Arrangements  Family members    Support System  Family    The type of support provided  Emotional; Physical    Do you have social support  Yes, as much as I need    Are you recieving any outpatient services  No    Are you recieving home care services  No    Are you using any community resources  No    Current waiver services  No    Have you fallen in the last 12 months  No    Interperter language line needed  No    Counseling  Patient    Counseling topics  Prognosis          HPI  Review of Systems   Constitutional:  Negative for chills and fever.   HENT:  Negative for sore throat.    Respiratory:  Negative for shortness of breath.    Cardiovascular:  " "Negative for chest pain and palpitations.   Gastrointestinal:  Negative for nausea and vomiting.   Musculoskeletal:  Negative for arthralgias.   Skin:  Negative for rash.   Neurological:  Negative for numbness and headaches.     Objective   /80 (BP Location: Left arm, Patient Position: Sitting, Cuff Size: Standard)   Pulse 68   Temp 98 °F (36.7 °C) (Temporal)   Resp 18   Ht 5' 10\" (1.778 m)   Wt 70.3 kg (155 lb)   SpO2 100%   BMI 22.24 kg/m²     Physical Exam  Vitals reviewed.   Constitutional:       General: He is not in acute distress.     Appearance: Normal appearance.   HENT:      Head: Normocephalic and atraumatic.      Right Ear: External ear normal.      Left Ear: External ear normal.      Nose: Nose normal.      Mouth/Throat:      Pharynx: Oropharynx is clear.   Eyes:      Conjunctiva/sclera: Conjunctivae normal.   Cardiovascular:      Rate and Rhythm: Normal rate and regular rhythm.      Pulses: Normal pulses.      Heart sounds: Normal heart sounds.   Pulmonary:      Effort: Pulmonary effort is normal. No respiratory distress.      Breath sounds: Normal breath sounds. No wheezing, rhonchi or rales.   Abdominal:      Palpations: Abdomen is soft.   Musculoskeletal:      Right lower leg: No edema.      Left lower leg: No edema.   Skin:     General: Skin is warm.   Neurological:      Mental Status: He is alert and oriented to person, place, and time.   Psychiatric:         Mood and Affect: Mood normal.         Behavior: Behavior normal.       Medications have been reviewed by provider in current encounter      "

## 2024-12-30 ENCOUNTER — TELEPHONE (OUTPATIENT)
Age: 89
End: 2024-12-30

## 2024-12-30 NOTE — TELEPHONE ENCOUNTER
Pt called in stating that he was recently hospitalized and diagnosed with afib. Pt states that his PM metoprolol was increased to 50mg so he is taking Metoprolol 50mg BID, he was also started on Amiodarone 200mg daily, Eliquis 5mg BID.    Scheduled pt for a hospital follow up on 1/10.      Pt states he was advised to check with cardiologist if he should continue the medication that was prescribed by the hospital. Also if pt should have a stress test done.    Please advise

## 2024-12-30 NOTE — TELEPHONE ENCOUNTER
Okay to continue current medicine.  Will decide on stress test at his next office visit.  Thank you     Spoke with Jhonny and relayed message as given per Dr. العراقي, Jhonny is aware and understood.

## 2024-12-31 NOTE — PROGRESS NOTES
Cardiology Follow Up    Jhonny Knowles .  11/16/1934  [id number]  [facility] [place]  [address]  [address]  [facility] [id number] [phone]    1. Atrial fibrillation with rapid ventricular response (HCC)  Hepatic function panel    TSH, 3rd generation with Free T4 reflex    POCT ECG    amiodarone 200 mg tablet    NM myocardial perfusion spect (rx stress and/or rest)      2. Cardiac pacemaker in situ  Hepatic function panel    TSH, 3rd generation with Free T4 reflex    POCT ECG    amiodarone 200 mg tablet    NM myocardial perfusion spect (rx stress and/or rest)      3. Essential (primary) hypertension  Hepatic function panel    TSH, 3rd generation with Free T4 reflex    POCT ECG    amiodarone 200 mg tablet    NM myocardial perfusion spect (rx stress and/or rest)      4. Pure hypercholesterolemia  Hepatic function panel    TSH, 3rd generation with Free T4 reflex    POCT ECG    amiodarone 200 mg tablet    NM myocardial perfusion spect (rx stress and/or rest)      5. Coronary arteriosclerosis  Hepatic function panel    TSH, 3rd generation with Free T4 reflex    POCT ECG    amiodarone 200 mg tablet    NM myocardial perfusion spect (rx stress and/or rest)      6. Non-rheumatic mitral regurgitation  Hepatic function panel    TSH, 3rd generation with Free T4 reflex    POCT ECG    amiodarone 200 mg tablet    NM myocardial perfusion spect (rx stress and/or rest)      7. New onset a-fib (HCC)  Hepatic function panel    TSH, 3rd generation with Free T4 reflex    POCT ECG    amiodarone 200 mg tablet    apixaban (ELIQUIS) 5 mg        Interval History:Patient is here for FU.  Medtronic pacer PPM interogation 11/2024 demonstrated an appropriately functioning device with no arrhythmia noted. He has CAD with PCI of the LAD.  He had PTCA stent of the LAD 11/2002 with cutting balloon angioplasty of the LAD 2/2010 in the setting of unstable angina.   Echocardiogram 2/2018 demonstrated preserved LV systolic function and no significant valve disease.  Lipid profile 4/2024 demonstrated TC of 142 with an HDL of 41 and a calculated LDL of 74.  He is on Atorvastatin.  He has prostate cancer and has FU with Urology. He went to the [facility] 12/2022 with left shoulder discomfort.  He was concerned about a cardiac etiology. He injured his left leg in a fall at his home 12/2023.  He had left hip replacement on that side.  He was recently seen by our group 12/2024 while hospitalized with presenting symptom being SOB/dizziness.  He was found to have episodes of WCT.  Cardiology review of EKG suggested Afib with aberrant conduction and intermittent PVCs.  He received a dose of IV amiodarone with conversion to NSR.  Device interrogation reviewed by EP suggested that Afib could not be excluded.  Plan was to continue Amiodarone with load, Metoprolol 50 mg BID and Eliquis 5 mg BID.  Consideration for pharmacologic nuclear stress test.  Echocardiogram 12/13/2024 demonstrated LVEF of 70% with mild AI/moderate MR and mild to moderate TR. he has been doing well.  His EKG today demonstrates NSR with appropriate ventricular pacing.    Patient Active Problem List   Diagnosis   • Aortic valve regurgitation   • CAD (coronary artery disease)   • Carpal tunnel syndrome   • Disc degeneration, lumbar   • Facet arthritis of lumbar region   • Glaucoma   • Hyperlipidemia   • Hypertension   • Lumbar canal stenosis   • Mitral regurgitation   • Prostate cancer (HCC)   • Type 2 diabetes mellitus (HCC)   • SSS (sick sinus syndrome) (HCC)   • Atrial fibrillation with RVR (HCC)   • Elevated troponin level not due to acute coronary syndrome   • Positive blood culture     Past Medical History:   Diagnosis Date   • Basal cell carcinoma    • Benign polyp of large intestine    • Cancer (HCC)    • Diabetes mellitus (HCC) 2010   • Osteoarthritis of left hip     last assessed 09Jan2017   • Piriformis  syndrome of left side     last assessed 18Oct2016     Social History     Socioeconomic History   • Marital status: /Civil Union     Spouse name: Not on file   • Number of children: Not on file   • Years of education: Not on file   • Highest education level: Not on file   Occupational History   • Not on file   Tobacco Use   • Smoking status: Never   • Smokeless tobacco: Never   Vaping Use   • Vaping status: Never Used   Substance and Sexual Activity   • Alcohol use: No   • Drug use: Never   • Sexual activity: Not Currently     Partners: Female     Birth control/protection: None   Other Topics Concern   • Not on file   Social History Narrative   • Not on file     Social Drivers of Health     Financial Resource Strain: Low Risk  (3/28/2023)    Overall Financial Resource Strain (CARDIA)    • Difficulty of Paying Living Expenses: Not very hard   Food Insecurity: No Food Insecurity (12/12/2024)    Nursing - Inadequate Food Risk Classification    • Worried About Running Out of Food in the Last Year: Never true    • Ran Out of Food in the Last Year: Never true    • Ran Out of Food in the Last Year: Never true   Transportation Needs: No Transportation Needs (12/12/2024)    Nursing - Transportation Risk Classification    • Lack of Transportation: Not on file    • Lack of Transportation: No   Physical Activity: Not on file   Stress: Not on file   Social Connections: Not on file   Intimate Partner Violence: Unknown (12/12/2024)    Nursing IPS    • Feels Physically and Emotionally Safe: Not on file    • Physically Hurt by Someone: Not on file    • Humiliated or Emotionally Abused by Someone: Not on file    • Physically Hurt by Someone: No    • Hurt or Threatened by Someone: No   Housing Stability: Unknown (12/12/2024)    Nursing: Inadequate Housing Risk Classification    • Has Housing: Not on file    • Worried About Losing Housing: Not on file    • Unable to Get Utilities: Not on file    • Unable to Pay for Housing in the  Last Year: No    • Has Housin      Family History   Problem Relation Age of Onset   • Coronary artery disease Mother    • Diabetes Maternal Grandmother      Past Surgical History:   Procedure Laterality Date   • APPENDECTOMY  1966   • CARDIAC PACEMAKER PLACEMENT  2005   • CATARACT EXTRACTION Left 10/01/1995   • CATARACT EXTRACTION Right 2000   • CORONARY ANGIOPLASTY  2010   • CORONARY ANGIOPLASTY WITH STENT PLACEMENT  2002   • EYE SURGERY  Cataract   &    • INGUINAL HERNIA REPAIR Right 1998   • INGUINAL HERNIA REPAIR Left 2009   • JOINT REPLACEMENT   &     • LUMBAR LAMINECTOMY  2002   • REPLACEMENT TOTAL KNEE Right 2010    right total knee replacement with complications   • RHIZOTOMY     • TONSILLECTOMY AND ADENOIDECTOMY  1937    193   • TOTAL HIP ARTHROPLASTY Left 2017       Current Outpatient Medications:   •  acetaminophen (TYLENOL) 325 mg tablet, Take 650 mg by mouth every 6 (six) hours as needed for mild pain, Disp: , Rfl:   •  amiodarone 200 mg tablet, Take 1 tablet (200 mg total) by mouth 3 (three) times a day with meals for 7 days, THEN 1 tablet (200 mg total) 2 (two) times a day for 7 days, THEN 1 tablet (200 mg total) daily., Disp: 65 tablet, Rfl: 0  •  amiodarone 200 mg tablet, Take 1 tablet (200 mg total) by mouth daily, Disp: 90 tablet, Rfl: 3  •  apixaban (ELIQUIS) 5 mg, Take 1 tablet (5 mg total) by mouth 2 (two) times a day, Disp: 180 tablet, Rfl: 3  •  atorvastatin (LIPITOR) 10 mg tablet, TAKE 1 TABLET DAILY, Disp: 90 tablet, Rfl: 1  •  cephalexin (KEFLEX) 500 mg capsule, For dental visits, Disp: , Rfl: 0  •  glimepiride (AMARYL) 1 mg tablet, TAKE 1 TABLET DAILY, Disp: 90 tablet, Rfl: 1  •  glucose blood (OneTouch Ultra) test strip, Patient tests once daily., Disp: 100 strip, Rfl: 1  •  Lancets (OneTouch Delica Plus [id number]) MISC, Use 1 application. daily, Disp: 100 each, Rfl: 1  •  metoprolol  "tartrate (LOPRESSOR) 50 mg tablet, Take 1 tablet (50 mg total) by mouth every 12 (twelve) hours, Disp: 60 tablet, Rfl: 0  •  Multiple Vitamins-Minerals (MULTIVITAMIN ADULT PO), Take 1 tablet by mouth every other day., Disp: , Rfl:   Allergies   Allergen Reactions   • Penicillins Rash   • Plavix [Clopidogrel] Rash       Labs:not applicable  Imaging: Echo complete w/ contrast if indicated  Result Date: 12/13/2024  Narrative: •  Left Ventricle: Left ventricular cavity size is normal. Wall thickness is normal. The left ventricular ejection fraction is 70%. Systolic function is normal. Wall motion is normal. Diastolic function is abnormal. •  Right Ventricle: Right ventricular cavity size is normal. Systolic function is normal. A pacer wire is present. •  Aortic Valve: There is mild regurgitation. •  Mitral Valve: There is moderate regurgitation. •  Tricuspid Valve: There is mild to moderate regurgitation. The right ventricular systolic pressure is moderately elevated. The estimated right ventricular systolic pressure is 54.00 mmHg. •  Prior TTE study available for comparison. Prior study date: 2/15/2018. Changes noted when compared to prior study. Changes include: Mitral regurgitation has somewhat worsened. .     XR chest 1 view portable  Result Date: 12/12/2024  Narrative: XR CHEST PORTABLE INDICATION: dizziness. COMPARISON: 12/2/2022 FINDINGS: Multi-lead left-sided cardiac conduction device. Clear lungs. No pneumothorax or pleural effusion. Normal cardiomediastinal silhouette. Bones are unremarkable for age. Normal upper abdomen.     Impression: No acute cardiopulmonary disease. Workstation performed: [id number]       Review of Systems:  Review of Systems   All other systems reviewed and are negative.      Physical Exam:  /80 (BP Location: Left arm, Patient Position: Sitting, Cuff Size: Standard)   Pulse 75   Ht 5' 10\" (1.778 m)   Wt 70 kg (154 lb 6.4 oz)   SpO2 100%   BMI 22.15 kg/m²   Physical Exam  Vitals " reviewed.   Constitutional:       Appearance: He is well-developed.   HENT:      Head: Normocephalic and atraumatic.   Cardiovascular:      Rate and Rhythm: Normal rate.      Heart sounds: Normal heart sounds.   Pulmonary:      Effort: Pulmonary effort is normal.      Breath sounds: Normal breath sounds.   Musculoskeletal:      Cervical back: Normal range of motion.   Skin:     General: Skin is warm and dry.   Neurological:      Mental Status: He is alert and oriented to person, place, and time.         Discussion/Summary: I will continue his present medical regimen.  We will check a pharmacologic nuclear stress test to exclude ischemia.  I will discontinue ASA as he has bruising being on 2 blood thinners.  Will order TFTs and LFTs on amiodarone.  I have asked him to call if there is a problem in the interim.  I will see him in follow-up in 4 to 6 months and sooner as is necessary.

## 2025-01-10 ENCOUNTER — OFFICE VISIT (OUTPATIENT)
Dept: CARDIOLOGY CLINIC | Facility: CLINIC | Age: OVER 89
End: 2025-01-10
Payer: MEDICARE

## 2025-01-10 VITALS
SYSTOLIC BLOOD PRESSURE: 146 MMHG | BODY MASS INDEX: 22.1 KG/M2 | OXYGEN SATURATION: 100 % | HEART RATE: 75 BPM | DIASTOLIC BLOOD PRESSURE: 80 MMHG | WEIGHT: 154.4 LBS | HEIGHT: 70 IN

## 2025-01-10 DIAGNOSIS — E78.00 PURE HYPERCHOLESTEROLEMIA: ICD-10-CM

## 2025-01-10 DIAGNOSIS — I48.91 NEW ONSET A-FIB (HCC): ICD-10-CM

## 2025-01-10 DIAGNOSIS — I10 ESSENTIAL (PRIMARY) HYPERTENSION: ICD-10-CM

## 2025-01-10 DIAGNOSIS — Z95.0 CARDIAC PACEMAKER IN SITU: ICD-10-CM

## 2025-01-10 DIAGNOSIS — I48.91 ATRIAL FIBRILLATION WITH RAPID VENTRICULAR RESPONSE (HCC): Primary | ICD-10-CM

## 2025-01-10 DIAGNOSIS — I34.0 NON-RHEUMATIC MITRAL REGURGITATION: ICD-10-CM

## 2025-01-10 DIAGNOSIS — I25.10 CORONARY ARTERIOSCLEROSIS: ICD-10-CM

## 2025-01-10 PROCEDURE — 99214 OFFICE O/P EST MOD 30 MIN: CPT | Performed by: INTERNAL MEDICINE

## 2025-01-10 PROCEDURE — 93000 ELECTROCARDIOGRAM COMPLETE: CPT | Performed by: INTERNAL MEDICINE

## 2025-01-10 RX ORDER — AMIODARONE HYDROCHLORIDE 200 MG/1
200 TABLET ORAL DAILY
Qty: 90 TABLET | Refills: 3 | Status: SHIPPED | OUTPATIENT
Start: 2025-01-10

## 2025-01-10 NOTE — PATIENT INSTRUCTIONS
I will continue the patient's present medical regimen.  The patient appears well compensated.  I have asked the patient to call if there is a problem in the interim otherwise I will see the patient in six months time.  Please get blood work done at your convenience.  Please have the stress test done at your convenience.

## 2025-01-10 NOTE — TELEPHONE ENCOUNTER
Eliquis 5 mg   Lot: [id number]  Exp. Date: 01/26  Count Dispensed: 4    Samples given at ov, per Dr. العراقي.

## 2025-01-16 ENCOUNTER — TELEPHONE (OUTPATIENT)
Age: OVER 89
End: 2025-01-16

## 2025-01-16 NOTE — TELEPHONE ENCOUNTER
"Spoke with Jhonny and relayed message as given per Dr. العراقي. I also relayed message as written in the scheduling instructions for the test below.     Scheduling Instructions    \"Patient may eat a light meal for breakfast (toast, bagel, cereal, oatmeal or fruit). NO caffeine, chocolate or decaffeinated products 12 hours prior to test. Drink only water or juice day of test.        Jhonny is aware and understood.   "

## 2025-01-16 NOTE — TELEPHONE ENCOUNTER
Pt also has several dietary questions prior to testing tomorrow. L/M with stress dept at [facility] to call patient back to discuss

## 2025-01-16 NOTE — TELEPHONE ENCOUNTER
Pt called asking if he should hold any medications prior to lexiscan tomorrow?    Currently taking  Amiodarone 200 mg  Lopressor 50 mg BID

## 2025-01-17 ENCOUNTER — HOSPITAL ENCOUNTER (OUTPATIENT)
Dept: NON INVASIVE DIAGNOSTICS | Facility: CLINIC | Age: OVER 89
Discharge: HOME | End: 2025-01-17
Payer: MEDICARE

## 2025-01-17 DIAGNOSIS — I48.91 ATRIAL FIBRILLATION WITH RAPID VENTRICULAR RESPONSE (HCC): ICD-10-CM

## 2025-01-17 DIAGNOSIS — Z95.0 CARDIAC PACEMAKER IN SITU: ICD-10-CM

## 2025-01-17 DIAGNOSIS — I10 ESSENTIAL (PRIMARY) HYPERTENSION: ICD-10-CM

## 2025-01-17 DIAGNOSIS — I25.10 CORONARY ARTERIOSCLEROSIS: ICD-10-CM

## 2025-01-17 DIAGNOSIS — E78.00 PURE HYPERCHOLESTEROLEMIA: ICD-10-CM

## 2025-01-17 DIAGNOSIS — I34.0 NON-RHEUMATIC MITRAL REGURGITATION: ICD-10-CM

## 2025-01-17 LAB
CHEST PAIN STATEMENT: NORMAL
MAX DIASTOLIC BP: 68 MMHG
MAX HR PERCENT: 58 %
MAX HR: 76 BPM
MAX PREDICTED HEART RATE: 130 BPM
PROTOCOL NAME: NORMAL
RATE PRESSURE PRODUCT: 8308
REASON FOR TERMINATION: NORMAL
SL CV REST NUCLEAR ISOTOPE DOSE: 10.8 MCI
SL CV STRESS NUCLEAR ISOTOPE DOSE: 32.4 MCI
SL CV STRESS RECOVERY BP: NORMAL MMHG
SL CV STRESS RECOVERY HR: 63 BPM
SL CV STRESS RECOVERY O2 SAT: 99 %
SPECT HRT GATED+EF W RNC IV: 75 %
STRESS ANGINA INDEX: 0
STRESS BASELINE BP: NORMAL MMHG
STRESS BASELINE HR: 60 BPM
STRESS O2 SAT REST: 100 %
STRESS PEAK HR: 62 BPM
STRESS POST ESTIMATED WORKLOAD: 1 METS
STRESS POST EXERCISE DUR MIN: 3 MIN
STRESS POST EXERCISE DUR SEC: 0 SEC
STRESS POST O2 SAT PEAK: 98 %
STRESS POST PEAK BP: 134 MMHG
STRESS POST PEAK HR: 76 BPM
STRESS POST PEAK SYSTOLIC BP: 146 MMHG
STRESS/REST PERFUSION RATIO: 0.99
TARGET HR FORMULA: NORMAL
TEST INDICATION: NORMAL

## 2025-01-17 PROCEDURE — A9502 TC99M TETROFOSMIN: HCPCS

## 2025-01-17 PROCEDURE — 78452 HT MUSCLE IMAGE SPECT MULT: CPT

## 2025-01-17 PROCEDURE — 78452 HT MUSCLE IMAGE SPECT MULT: CPT | Performed by: INTERNAL MEDICINE

## 2025-01-17 PROCEDURE — 93016 CV STRESS TEST SUPVJ ONLY: CPT | Performed by: INTERNAL MEDICINE

## 2025-01-17 PROCEDURE — 93018 CV STRESS TEST I&R ONLY: CPT | Performed by: INTERNAL MEDICINE

## 2025-01-17 PROCEDURE — 93017 CV STRESS TEST TRACING ONLY: CPT

## 2025-01-17 RX ORDER — REGADENOSON 0.08 MG/ML
0.4 INJECTION, SOLUTION INTRAVENOUS ONCE
Status: COMPLETED | OUTPATIENT
Start: 2025-01-17 | End: 2025-01-17

## 2025-01-17 RX ADMIN — REGADENOSON 0.4 MG: 0.08 INJECTION, SOLUTION INTRAVENOUS at 13:40

## 2025-01-21 ENCOUNTER — TELEPHONE (OUTPATIENT)
Dept: CARDIOLOGY CLINIC | Facility: CLINIC | Age: OVER 89
End: 2025-01-21

## 2025-01-21 LAB
CHEST PAIN STATEMENT: NORMAL
MAX DIASTOLIC BP: 68 MMHG
MAX PREDICTED HEART RATE: 130 BPM
PROTOCOL NAME: NORMAL
REASON FOR TERMINATION: NORMAL
STRESS POST EXERCISE DUR MIN: 3 MIN
STRESS POST EXERCISE DUR SEC: 0 SEC
STRESS POST PEAK HR: 76 BPM
STRESS POST PEAK SYSTOLIC BP: 146 MMHG
TARGET HR FORMULA: NORMAL
TEST INDICATION: NORMAL

## 2025-01-21 NOTE — TELEPHONE ENCOUNTER
Spoke with Kala to discuss clarification of Jhonny's Eliquis so they can fill it for him. She stated she did not see anything that needed to be clarified on her end and stated she will reach out to a tech to see if they see anything she is unable to see and was placed on hold.    She reviewed with the tech and they did not see anything and relayed that the eliquis is currently being processed to be filled and at this time and they will contact us if they need anything. Call time total was 20 minutes.

## 2025-01-21 NOTE — TELEPHONE ENCOUNTER
Spoke with Jhonny to update him that his Eliquis is processing and Jhonny has been already made aware. I asked that he call back if needed and Jhonny is aware and understood.

## 2025-01-21 NOTE — TELEPHONE ENCOUNTER
Patient called the RX Refill Line. Message is being forwarded to the office.     [facility] is [facility] eliquis. Patient states pharmacy has tried to reach out to office regarding medication, they have questions in regards to eliquis before they can go ahead and fill the eliquis for him. He states he received a message in regards to this matter.     Please contact patient at [phone]

## 2025-02-07 NOTE — PROGRESS NOTES
Cardiology Follow Up    Jhonny Knowles .  11/16/1934  [id number]  [facility] [place]  [address] [place] [place] [id number] [phone]    1. Essential (primary) hypertension        2. Pure hypercholesterolemia        3. Coronary arteriosclerosis        4. Non-rheumatic mitral regurgitation        5. Atrial fibrillation with rapid ventricular response (HCC)        6. Cardiac pacemaker in situ          Interval History: Patient is here for FU.  Medtronic pacer PPM interogation 11/2024 demonstrated an appropriately functioning device with no arrhythmia noted. He has CAD with PCI of the LAD.  He had PTCA stent of the LAD 11/2002 with cutting balloon angioplasty of the LAD 2/2010 in the setting of unstable angina.  Echocardiogram 2/2018 demonstrated preserved LV systolic function and no significant valve disease.  Lipid profile 4/2024 demonstrated TC of 142 with an HDL of 41 and a calculated LDL of 74.  He is on Atorvastatin.  He has prostate cancer and has FU with Urology. He went to the [facility] 12/2022 with left shoulder discomfort.  He was concerned about a cardiac etiology. He injured his left leg in a fall at his home 12/2023.  He had left hip replacement on that side.  He was recently seen by our group 12/2024 while hospitalized with presenting symptom being SOB/dizziness.  He was found to have episodes of WCT.  Cardiology review of EKG suggested Afib with aberrant conduction and intermittent PVCs.  He received a dose of IV amiodarone with conversion to NSR.  Device interrogation reviewed by EP suggested that Afib could not be excluded.  Plan was to continue Amiodarone with load, Metoprolol 50 mg BID and Eliquis 5 mg BID. Echocardiogram 12/13/2024 demonstrated LVEF of 70% with mild AI/moderate MR and mild to moderate TR. Pharmacologic nuclear stress test 1/17/2025 demonstrated image artifact without diagnostic evidence for  perfusion abnormality.  He has been well.  He has had no chest pain or significant dyspnea.  His vital signs are stable.  He sounds regular today on exam.  He has having his device check Wednesday.    Patient Active Problem List   Diagnosis   • Aortic valve regurgitation   • CAD (coronary artery disease)   • Carpal tunnel syndrome   • Disc degeneration, lumbar   • Facet arthritis of lumbar region   • Glaucoma   • Hyperlipidemia   • Hypertension   • Lumbar canal stenosis   • Mitral regurgitation   • Prostate cancer (HCC)   • Type 2 diabetes mellitus (HCC)   • SSS (sick sinus syndrome) (HCC)   • Atrial fibrillation with RVR (HCC)   • Elevated troponin level not due to acute coronary syndrome   • Positive blood culture     Past Medical History:   Diagnosis Date   • Basal cell carcinoma    • Benign polyp of large intestine    • Cancer (HCC)    • Diabetes mellitus (HCC) 2010   • Osteoarthritis of left hip     last assessed 09Jan2017   • Piriformis syndrome of left side     last assessed 18Oct2016     Social History     Socioeconomic History   • Marital status: /Civil Union     Spouse name: Not on file   • Number of children: Not on file   • Years of education: Not on file   • Highest education level: Not on file   Occupational History   • Not on file   Tobacco Use   • Smoking status: Never   • Smokeless tobacco: Never   Vaping Use   • Vaping status: Never Used   Substance and Sexual Activity   • Alcohol use: No   • Drug use: Never   • Sexual activity: Not Currently     Partners: Female     Birth control/protection: None   Other Topics Concern   • Not on file   Social History Narrative   • Not on file     Social Drivers of Health     Financial Resource Strain: Low Risk  (3/28/2023)    Overall Financial Resource Strain (CARDIA)    • Difficulty of Paying Living Expenses: Not very hard   Food Insecurity: No Food Insecurity (12/12/2024)    Nursing - Inadequate Food Risk Classification    • Worried About Running Out of  Food in the Last Year: Never true    • Ran Out of Food in the Last Year: Never true    • Ran Out of Food in the Last Year: Never true   Transportation Needs: No Transportation Needs (2024)    Nursing - Transportation Risk Classification    • Lack of Transportation: Not on file    • Lack of Transportation: No   Physical Activity: Not on file   Stress: Not on file   Social Connections: Not on file   Intimate Partner Violence: Unknown (2024)    Nursing IPS    • Feels Physically and Emotionally Safe: Not on file    • Physically Hurt by Someone: Not on file    • Humiliated or Emotionally Abused by Someone: Not on file    • Physically Hurt by Someone: No    • Hurt or Threatened by Someone: No   Housing Stability: Unknown (2024)    Nursing: Inadequate Housing Risk Classification    • Has Housing: Not on file    • Worried About Losing Housing: Not on file    • Unable to Get Utilities: Not on file    • Unable to Pay for Housing in the Last Year: No    • Has Housin      Family History   Problem Relation Age of Onset   • Coronary artery disease Mother    • Diabetes Maternal Grandmother      Past Surgical History:   Procedure Laterality Date   • APPENDECTOMY     • CARDIAC PACEMAKER PLACEMENT  2005   • CATARACT EXTRACTION Left 10/01/1995   • CATARACT EXTRACTION Right 2000   • CORONARY ANGIOPLASTY  2010   • CORONARY ANGIOPLASTY WITH STENT PLACEMENT  2002   • EYE SURGERY  Cataract   &    • INGUINAL HERNIA REPAIR Right 1998   • INGUINAL HERNIA REPAIR Left 2009   • JOINT REPLACEMENT   &     • LUMBAR LAMINECTOMY  2002   • REPLACEMENT TOTAL KNEE Right 2010    right total knee replacement with complications   • RHIZOTOMY     • TONSILLECTOMY AND ADENOIDECTOMY  1937    193   • TOTAL HIP ARTHROPLASTY Left 2017       Current Outpatient Medications:   •  acetaminophen (TYLENOL) 325 mg tablet, Take 650 mg by mouth  every 6 (six) hours as needed for mild pain, Disp: , Rfl:   •  amiodarone 200 mg tablet, Take 1 tablet (200 mg total) by mouth daily, Disp: 90 tablet, Rfl: 3  •  apixaban (ELIQUIS) 5 mg, Take 1 tablet (5 mg total) by mouth 2 (two) times a day, Disp: 180 tablet, Rfl: 3  •  atorvastatin (LIPITOR) 10 mg tablet, TAKE 1 TABLET DAILY, Disp: 90 tablet, Rfl: 1  •  cephalexin (KEFLEX) 500 mg capsule, For dental visits, Disp: , Rfl: 0  •  glimepiride (AMARYL) 1 mg tablet, TAKE 1 TABLET DAILY, Disp: 90 tablet, Rfl: 1  •  glucose blood (OneTouch Ultra) test strip, Patient tests once daily., Disp: 100 strip, Rfl: 1  •  Lancets (OneTouch Delica Plus Xdiskx39T) MISC, Use 1 application. daily, Disp: 100 each, Rfl: 1  •  metoprolol tartrate (LOPRESSOR) 50 mg tablet, Take 1 tablet (50 mg total) by mouth every 12 (twelve) hours, Disp: 60 tablet, Rfl: 0  •  Multiple Vitamins-Minerals (MULTIVITAMIN ADULT PO), Take 1 tablet by mouth every other day., Disp: , Rfl:   •  amiodarone 200 mg tablet, Take 1 tablet (200 mg total) by mouth 3 (three) times a day with meals for 7 days, THEN 1 tablet (200 mg total) 2 (two) times a day for 7 days, THEN 1 tablet (200 mg total) daily., Disp: 65 tablet, Rfl: 0  Allergies   Allergen Reactions   • Penicillins Rash   • Plavix [Clopidogrel] Rash       Labs:not applicable  Imaging: XR hip/pelv 2-3 vws left if performed  Result Date: 1/31/2025  Narrative: AP/LAT of the left hip and AP Pelvis: Cementless acetabular and femoral components are well fixed and in anatomic position.  It is a Smith and Nephew Spectron stem with a Smith and Nephew R3 cup with two screws.  There are no radiographic signs of loosening or subsidence. There are no radiographic signs of stress shielding. This report is limited to orthopaedic interpretation.     Stress strip  Result Date: 1/21/2025  Narrative: Confirmed by JOSE HARVEY ([id number]),  Helena John ([id number]) on 1/21/2025 11:16:49 AM    NM myocardial perfusion spect (rx  "stress and/or rest)  Result Date: 1/17/2025  Narrative: •  Stress ECG: The ECG was uninterpretable due to paced rhythm. •  Perfusion: There is a left ventricular perfusion defect that is medium to large in size with moderate reduction in uptake present in the entire inferior location(s) that is paradoxical.  However, the perfusion abnormality in the apical portion of the inferior wall did not correct with prone imaging, and therefore a small area of possible infarct can not be excluded. Most of the defect appears to be an artifact caused by diaphragmatic activity. •  Stress Function: Left ventricular function post-stress is normal. Stress ejection fraction is 75%. •  Stress Combined Conclusion: There is image artifact, without diagnostic evidence for perfusion abnormality other than in the apical portion of the inferior wall that does not correct with prone imaging.  Wall motion appears normal in that distribution, favoring artifact in that defect as well.       Review of Systems:  Review of Systems   All other systems reviewed and are negative.      Physical Exam:  /70 (BP Location: Left arm, Patient Position: Sitting, Cuff Size: Standard)   Pulse 86   Ht 5' 10\" (1.778 m)   Wt 70.4 kg (155 lb 1.6 oz)   SpO2 99%   BMI 22.25 kg/m²   Physical Exam  Vitals reviewed.   Constitutional:       Appearance: He is well-developed.   HENT:      Head: Normocephalic and atraumatic.   Cardiovascular:      Rate and Rhythm: Normal rate.      Heart sounds: Normal heart sounds.   Pulmonary:      Effort: Pulmonary effort is normal.      Breath sounds: Normal breath sounds.   Musculoskeletal:      Cervical back: Normal range of motion.   Skin:     General: Skin is warm and dry.   Neurological:      Mental Status: He is alert and oriented to person, place, and time.       Discussion/Summary:I will continue the patient's present medical regimen.  The patient appears well compensated.  I have asked the patient to call if there " is a problem in the interim otherwise I will see the patient in six months time.

## 2025-02-10 ENCOUNTER — OFFICE VISIT (OUTPATIENT)
Dept: CARDIOLOGY CLINIC | Facility: CLINIC | Age: OVER 89
End: 2025-02-10
Payer: MEDICARE

## 2025-02-10 VITALS
DIASTOLIC BLOOD PRESSURE: 70 MMHG | HEIGHT: 70 IN | HEART RATE: 86 BPM | BODY MASS INDEX: 22.2 KG/M2 | SYSTOLIC BLOOD PRESSURE: 148 MMHG | WEIGHT: 155.1 LBS | OXYGEN SATURATION: 99 %

## 2025-02-10 DIAGNOSIS — I34.0 NON-RHEUMATIC MITRAL REGURGITATION: ICD-10-CM

## 2025-02-10 DIAGNOSIS — Z95.0 CARDIAC PACEMAKER IN SITU: ICD-10-CM

## 2025-02-10 DIAGNOSIS — I48.91 ATRIAL FIBRILLATION WITH RAPID VENTRICULAR RESPONSE (HCC): ICD-10-CM

## 2025-02-10 DIAGNOSIS — I25.10 CORONARY ARTERIOSCLEROSIS: ICD-10-CM

## 2025-02-10 DIAGNOSIS — E78.00 PURE HYPERCHOLESTEROLEMIA: ICD-10-CM

## 2025-02-10 DIAGNOSIS — I10 ESSENTIAL (PRIMARY) HYPERTENSION: Primary | ICD-10-CM

## 2025-02-10 PROCEDURE — 99214 OFFICE O/P EST MOD 30 MIN: CPT | Performed by: INTERNAL MEDICINE

## 2025-02-11 ENCOUNTER — REMOTE DEVICE CLINIC VISIT (OUTPATIENT)
Dept: CARDIOLOGY CLINIC | Facility: CLINIC | Age: OVER 89
End: 2025-02-11
Payer: MEDICARE

## 2025-02-11 DIAGNOSIS — I44.30 AVB (ATRIOVENTRICULAR BLOCK): Primary | ICD-10-CM

## 2025-02-11 PROCEDURE — 93294 REM INTERROG EVL PM/LDLS PM: CPT | Performed by: STUDENT IN AN ORGANIZED HEALTH CARE EDUCATION/TRAINING PROGRAM

## 2025-02-11 PROCEDURE — 93296 REM INTERROG EVL PM/IDS: CPT | Performed by: STUDENT IN AN ORGANIZED HEALTH CARE EDUCATION/TRAINING PROGRAM

## 2025-02-11 NOTE — PROGRESS NOTES
Results for orders placed or performed in visit on 02/11/25   Cardiac EP device report    Narrative    MDT DUAL PPM - NOT MRI CONDITIONAL  CARELINK TRANSMISSION: BATTERY VOLTAGE ADEQUATE (3.5 YRS). AP: 95.5%. : 98.9% (>40%~MVP-ON~AVB). ALL AVAILABLE LEAD PARAMETERS WITHIN NORMAL LIMITS. NO SIGNIFICANT HIGH RATE EPISODES. NORMAL DEVICE FUNCTION. CH

## 2025-02-12 ENCOUNTER — RESULTS FOLLOW-UP (OUTPATIENT)
Dept: NON INVASIVE DIAGNOSTICS | Facility: HOSPITAL | Age: OVER 89
End: 2025-02-12

## 2025-02-17 DIAGNOSIS — I48.91 NEW ONSET A-FIB (HCC): ICD-10-CM

## 2025-02-17 DIAGNOSIS — I49.3 FREQUENT PVCS: ICD-10-CM

## 2025-02-17 NOTE — TELEPHONE ENCOUNTER
Medication was given in hospital. Pharmacy needs a new script with Dr العراقي on the script.    Reason for call:   [x] Refill   [] Prior Auth  [] Other:     Office:   [] PCP/Provider -   [x] Specialty/Provider - Cardio    Medication: metoprolol tartrate (LOPRESSOR) 50 mg tablet     Dose/Frequency: Take 1 tablet (50 mg total) by mouth every 12 (twelve) hours     Quantity: 60    [facility]: [facility] - [address]     Does the patient have enough for 3 days?   [] Yes   [x] No - Send as HP to POD

## 2025-02-18 RX ORDER — METOPROLOL TARTRATE 50 MG
50 TABLET ORAL EVERY 12 HOURS SCHEDULED
Qty: 60 TABLET | Refills: 0 | Status: SHIPPED | OUTPATIENT
Start: 2025-02-18

## 2025-02-18 NOTE — TELEPHONE ENCOUNTER
Refill must be reviewed and completed by the office or provider. The refill is unable to be approved or denied by the medication management team.    Last ordered by another provider. Please review to see if the refill is appropriate.   Hospital Encounter:   12/12/2024 - 12/14/2024 (2 days) [facility]

## 2025-03-04 DIAGNOSIS — E78.00 PURE HYPERCHOLESTEROLEMIA: ICD-10-CM

## 2025-03-04 DIAGNOSIS — E11.9 TYPE 2 DIABETES MELLITUS WITHOUT COMPLICATION, WITHOUT LONG-TERM CURRENT USE OF INSULIN (HCC): ICD-10-CM

## 2025-03-04 RX ORDER — GLIMEPIRIDE 1 MG/1
1 TABLET ORAL DAILY
Qty: 90 TABLET | Refills: 1 | Status: SHIPPED | OUTPATIENT
Start: 2025-03-04

## 2025-03-05 RX ORDER — ATORVASTATIN CALCIUM 10 MG/1
10 TABLET, FILM COATED ORAL DAILY
Qty: 90 TABLET | Refills: 1 | Status: SHIPPED | OUTPATIENT
Start: 2025-03-05

## 2025-03-17 DIAGNOSIS — E11.9 TYPE 2 DIABETES MELLITUS WITHOUT COMPLICATION, WITHOUT LONG-TERM CURRENT USE OF INSULIN (HCC): ICD-10-CM

## 2025-03-17 RX ORDER — BLOOD SUGAR DIAGNOSTIC
STRIP MISCELLANEOUS
Qty: 100 STRIP | Refills: 5 | Status: SHIPPED | OUTPATIENT
Start: 2025-03-17

## 2025-03-17 NOTE — TELEPHONE ENCOUNTER
Patient's  called the RX Refill Line. Message is being forwarded to the office.     Pharmacy is requesting documents and a call from the office asap and a rx to them.      Pharmacy: [id number] - [place] - [address]     Please contact patient at [phone]

## 2025-03-20 DIAGNOSIS — I48.91 NEW ONSET A-FIB (HCC): ICD-10-CM

## 2025-03-20 DIAGNOSIS — I49.3 FREQUENT PVCS: ICD-10-CM

## 2025-03-20 NOTE — TELEPHONE ENCOUNTER
Not a duplicate. [facility] is requesting a new script    Reason for call:   [x] Refill   [] Prior Auth  [] Other:     Office:   [] PCP/Provider -   [x] Specialty/Provider - Cardiology    Medication: metoprolol tartrate (LOPRESSOR) 50 mg tablet     Dose/Frequency: Take 1 tablet (50 mg total) by mouth every 12 (twelve) hours     Quantity: 60 tablet    Pharmacy: [facility]      Local Pharmacy   Does the patient have enough for 3 days?   [x] Yes   [] No - Send as HP to POD    Mail Away Pharmacy   Does the patient have enough for 10 days?   [] Yes   [] No - Send as HP to POD

## 2025-03-21 RX ORDER — METOPROLOL TARTRATE 50 MG
50 TABLET ORAL EVERY 12 HOURS SCHEDULED
Qty: 90 TABLET | Refills: 0 | Status: SHIPPED | OUTPATIENT
Start: 2025-03-21

## 2025-04-11 ENCOUNTER — APPOINTMENT (OUTPATIENT)
Dept: LAB | Facility: CLINIC | Age: OVER 89
End: 2025-04-11
Payer: MEDICARE

## 2025-04-11 DIAGNOSIS — E78.00 PURE HYPERCHOLESTEROLEMIA: ICD-10-CM

## 2025-04-11 DIAGNOSIS — I48.91 NEW ONSET A-FIB (HCC): ICD-10-CM

## 2025-04-11 DIAGNOSIS — I48.91 ATRIAL FIBRILLATION WITH RAPID VENTRICULAR RESPONSE (HCC): ICD-10-CM

## 2025-04-11 DIAGNOSIS — C61 PROSTATE CANCER (HCC): ICD-10-CM

## 2025-04-11 DIAGNOSIS — I10 ESSENTIAL (PRIMARY) HYPERTENSION: ICD-10-CM

## 2025-04-11 DIAGNOSIS — I25.10 CORONARY ARTERIOSCLEROSIS: ICD-10-CM

## 2025-04-11 DIAGNOSIS — E11.9 TYPE 2 DIABETES MELLITUS WITHOUT COMPLICATION, WITHOUT LONG-TERM CURRENT USE OF INSULIN (HCC): ICD-10-CM

## 2025-04-11 DIAGNOSIS — I34.0 NON-RHEUMATIC MITRAL REGURGITATION: ICD-10-CM

## 2025-04-11 DIAGNOSIS — Z95.0 CARDIAC PACEMAKER IN SITU: ICD-10-CM

## 2025-04-11 LAB
ALBUMIN SERPL BCG-MCNC: 4.4 G/DL (ref 3.5–5)
ALP SERPL-CCNC: 55 U/L (ref 34–104)
ALT SERPL W P-5'-P-CCNC: 19 U/L (ref 7–52)
ANION GAP SERPL CALCULATED.3IONS-SCNC: 7 MMOL/L (ref 4–13)
AST SERPL W P-5'-P-CCNC: 22 U/L (ref 13–39)
BASOPHILS # BLD AUTO: 0.06 THOUSANDS/ÂΜL (ref 0–0.1)
BASOPHILS NFR BLD AUTO: 1 % (ref 0–1)
BILIRUB SERPL-MCNC: 0.57 MG/DL (ref 0.2–1)
BUN SERPL-MCNC: 23 MG/DL (ref 5–25)
CALCIUM SERPL-MCNC: 9.5 MG/DL (ref 8.4–10.2)
CHLORIDE SERPL-SCNC: 102 MMOL/L (ref 96–108)
CHOLEST SERPL-MCNC: 171 MG/DL (ref ?–200)
CO2 SERPL-SCNC: 30 MMOL/L (ref 21–32)
CREAT SERPL-MCNC: 1.04 MG/DL (ref 0.6–1.3)
CREAT UR-MCNC: 86.3 MG/DL
EOSINOPHIL # BLD AUTO: 0.23 THOUSAND/ÂΜL (ref 0–0.61)
EOSINOPHIL NFR BLD AUTO: 4 % (ref 0–6)
ERYTHROCYTE [DISTWIDTH] IN BLOOD BY AUTOMATED COUNT: 13.5 % (ref 11.6–15.1)
EST. AVERAGE GLUCOSE BLD GHB EST-MCNC: 128 MG/DL
GFR SERPL CREATININE-BSD FRML MDRD: 62 ML/MIN/1.73SQ M
GLUCOSE P FAST SERPL-MCNC: 114 MG/DL (ref 65–99)
HBA1C MFR BLD: 6.1 %
HCT VFR BLD AUTO: 41.4 % (ref 36.5–49.3)
HDLC SERPL-MCNC: 45 MG/DL
HGB BLD-MCNC: 12.7 G/DL (ref 12–17)
IMM GRANULOCYTES # BLD AUTO: 0.01 THOUSAND/UL (ref 0–0.2)
IMM GRANULOCYTES NFR BLD AUTO: 0 % (ref 0–2)
LDLC SERPL CALC-MCNC: 105 MG/DL (ref 0–100)
LYMPHOCYTES # BLD AUTO: 0.72 THOUSANDS/ÂΜL (ref 0.6–4.47)
LYMPHOCYTES NFR BLD AUTO: 12 % (ref 14–44)
MCH RBC QN AUTO: 30.4 PG (ref 26.8–34.3)
MCHC RBC AUTO-ENTMCNC: 30.7 G/DL (ref 31.4–37.4)
MCV RBC AUTO: 99 FL (ref 82–98)
MICROALBUMIN UR-MCNC: 9.1 MG/L
MICROALBUMIN/CREAT 24H UR: 11 MG/G CREATININE (ref 0–30)
MONOCYTES # BLD AUTO: 0.58 THOUSAND/ÂΜL (ref 0.17–1.22)
MONOCYTES NFR BLD AUTO: 10 % (ref 4–12)
NEUTROPHILS # BLD AUTO: 4.5 THOUSANDS/ÂΜL (ref 1.85–7.62)
NEUTS SEG NFR BLD AUTO: 73 % (ref 43–75)
NONHDLC SERPL-MCNC: 126 MG/DL
NRBC BLD AUTO-RTO: 0 /100 WBCS
PLATELET # BLD AUTO: 197 THOUSANDS/UL (ref 149–390)
PMV BLD AUTO: 10.8 FL (ref 8.9–12.7)
POTASSIUM SERPL-SCNC: 4.7 MMOL/L (ref 3.5–5.3)
PROT SERPL-MCNC: 7.3 G/DL (ref 6.4–8.4)
PSA SERPL-MCNC: 0.15 NG/ML (ref 0–4)
RBC # BLD AUTO: 4.18 MILLION/UL (ref 3.88–5.62)
SODIUM SERPL-SCNC: 139 MMOL/L (ref 135–147)
TRIGL SERPL-MCNC: 105 MG/DL (ref ?–150)
WBC # BLD AUTO: 6.1 THOUSAND/UL (ref 4.31–10.16)

## 2025-04-11 PROCEDURE — 82570 ASSAY OF URINE CREATININE: CPT

## 2025-04-11 PROCEDURE — 84153 ASSAY OF PSA TOTAL: CPT

## 2025-04-11 PROCEDURE — 82043 UR ALBUMIN QUANTITATIVE: CPT

## 2025-04-11 PROCEDURE — 36415 COLL VENOUS BLD VENIPUNCTURE: CPT

## 2025-04-14 ENCOUNTER — RA CDI HCC (OUTPATIENT)
Dept: OTHER | Facility: HOSPITAL | Age: OVER 89
End: 2025-04-14

## 2025-04-14 DIAGNOSIS — I49.3 FREQUENT PVCS: ICD-10-CM

## 2025-04-14 DIAGNOSIS — I48.91 NEW ONSET A-FIB (HCC): ICD-10-CM

## 2025-04-16 RX ORDER — METOPROLOL TARTRATE 50 MG
50 TABLET ORAL EVERY 12 HOURS
Qty: 180 TABLET | Refills: 1 | Status: SHIPPED | OUTPATIENT
Start: 2025-04-16

## 2025-04-18 ENCOUNTER — OFFICE VISIT (OUTPATIENT)
Dept: FAMILY MEDICINE CLINIC | Facility: CLINIC | Age: OVER 89
End: 2025-04-18
Payer: MEDICARE

## 2025-04-18 VITALS
SYSTOLIC BLOOD PRESSURE: 126 MMHG | TEMPERATURE: 97.3 F | WEIGHT: 158.5 LBS | RESPIRATION RATE: 16 BRPM | BODY MASS INDEX: 22.74 KG/M2 | HEART RATE: 65 BPM | OXYGEN SATURATION: 98 % | DIASTOLIC BLOOD PRESSURE: 66 MMHG

## 2025-04-18 DIAGNOSIS — Z99.89 DEPENDENCE ON CANE: ICD-10-CM

## 2025-04-18 DIAGNOSIS — E11.9 TYPE 2 DIABETES MELLITUS WITHOUT COMPLICATION, WITHOUT LONG-TERM CURRENT USE OF INSULIN (HCC): ICD-10-CM

## 2025-04-18 DIAGNOSIS — Z00.00 MEDICARE ANNUAL WELLNESS VISIT, SUBSEQUENT: Primary | ICD-10-CM

## 2025-04-18 DIAGNOSIS — C61 PROSTATE CANCER (HCC): ICD-10-CM

## 2025-04-18 DIAGNOSIS — E11.51 TYPE 2 DIABETES MELLITUS WITH DIABETIC PERIPHERAL ANGIOPATHY WITHOUT GANGRENE, WITHOUT LONG-TERM CURRENT USE OF INSULIN (HCC): ICD-10-CM

## 2025-04-18 DIAGNOSIS — I10 PRIMARY HYPERTENSION: ICD-10-CM

## 2025-04-18 DIAGNOSIS — I48.91 ATRIAL FIBRILLATION WITH RVR (HCC): ICD-10-CM

## 2025-04-18 PROCEDURE — G0439 PPPS, SUBSEQ VISIT: HCPCS

## 2025-04-18 PROCEDURE — G2211 COMPLEX E/M VISIT ADD ON: HCPCS

## 2025-04-18 PROCEDURE — 99214 OFFICE O/P EST MOD 30 MIN: CPT

## 2025-04-18 NOTE — ASSESSMENT & PLAN NOTE
Lab Results   Component Value Date    HGBA1C 6.1 (H) 04/11/2025     Continues on glimepiride 1 mg daily, stable  Continue on lipitor 10 mg daily

## 2025-04-18 NOTE — PATIENT INSTRUCTIONS
Medicare Preventive Visit Patient Instructions  Thank you for completing your Welcome to Medicare Visit or Medicare Annual Wellness Visit today. Your next wellness visit will be due in one year (4/19/2026).  The screening/preventive services that you may require over the next 5-10 years are detailed below. Some tests may not apply to you based off risk factors and/or age. Screening tests ordered at today's visit but not completed yet may show as past due. Also, please note that scanned in results may not display below.  Preventive Screenings:  Service Recommendations Previous Testing/Comments   Colorectal Cancer Screening  Colonoscopy    Fecal Occult Blood Test (FOBT)/Fecal Immunochemical Test (FIT)  Fecal DNA/Cologuard Test  Flexible Sigmoidoscopy Age: 45-75 years old   Colonoscopy: every 10 years (May be performed more frequently if at higher risk)  OR  FOBT/FIT: every 1 year  OR  Cologuard: every 3 years  OR  Sigmoidoscopy: every 5 years  Screening may be recommended earlier than age 45 if at higher risk for colorectal cancer. Also, an individualized decision between you and your healthcare provider will decide whether screening between the ages of 76-85 would be appropriate. Colonoscopy: 04/26/2021  FOBT/FIT: Not on file  Cologuard: Not on file  Sigmoidoscopy: Not on file    Screening Not Indicated     Prostate Cancer Screening Individualized decision between patient and health care provider in men between ages of 55-69   Medicare will cover every 12 months beginning on the day after your 50th birthday PSA: 0.148 ng/mL     History Prostate Cancer  Screening Not Indicated     Hepatitis C Screening Once for adults born between 1945 and 1965  More frequently in patients at high risk for Hepatitis C Hep C Antibody: Not on file        Diabetes Screening 1-2 times per year if you're at risk for diabetes or have pre-diabetes Fasting glucose: 114 mg/dL (4/11/2025)  A1C: 6.1 % (4/11/2025)  Screening Not  Indicated  History Diabetes   Cholesterol Screening Once every 5 years if you don't have a lipid disorder. May order more often based on risk factors. Lipid panel: 04/11/2025  Screening Not Indicated  History Lipid Disorder      Other Preventive Screenings Covered by Medicare:  Abdominal Aortic Aneurysm (AAA) Screening: covered once if your at risk. You're considered to be at risk if you have a family history of AAA or a male between the age of 65-75 who smoking at least 100 cigarettes in your lifetime.  Lung Cancer Screening: covers low dose CT scan once per year if you meet all of the following conditions: (1) Age 55-77; (2) No signs or symptoms of lung cancer; (3) Current smoker or have quit smoking within the last 15 years; (4) You have a tobacco smoking history of at least 20 pack years (packs per day x number of years you smoked); (5) You get a written order from a healthcare provider.  Glaucoma Screening: covered annually if you're considered high risk: (1) You have diabetes OR (2) Family history of glaucoma OR (3)  aged 50 and older OR (4)  American aged 65 and older  Osteoporosis Screening: covered every 2 years if you meet one of the following conditions: (1) Have a vertebral abnormality; (2) On glucocorticoid therapy for more than 3 months; (3) Have primary hyperparathyroidism; (4) On osteoporosis medications and need to assess response to drug therapy.  HIV Screening: covered annually if you're between the age of 15-65. Also covered annually if you are younger than 15 and older than 65 with risk factors for HIV infection. For pregnant patients, it is covered up to 3 times per pregnancy.    Immunizations:  Immunization Recommendations   Influenza Vaccine Annual influenza vaccination during flu season is recommended for all persons aged >= 6 months who do not have contraindications   Pneumococcal Vaccine   * Pneumococcal conjugate vaccine = PCV13 (Prevnar 13), PCV15 (Vaxneuvance),  PCV20 (Prevnar 20)  * Pneumococcal polysaccharide vaccine = PPSV23 (Pneumovax) Adults 19-65 yo with certain risk factors or if 65+ yo  If never received any pneumonia vaccine: recommend Prevnar 20 (PCV20)  Give PCV20 if previously received 1 dose of PCV13 or PPSV23   Hepatitis B Vaccine 3 dose series if at intermediate or high risk (ex: diabetes, end stage renal disease, liver disease)   Respiratory syncytial virus (RSV) Vaccine - COVERED BY MEDICARE PART D  * RSVPreF3 (Arexvy) CDC recommends that adults 60 years of age and older may receive a single dose of RSV vaccine using shared clinical decision-making (SCDM)   Tetanus (Td) Vaccine - COST NOT COVERED BY MEDICARE PART B Following completion of primary series, a booster dose should be given every 10 years to maintain immunity against tetanus. Td may also be given as tetanus wound prophylaxis.   Tdap Vaccine - COST NOT COVERED BY MEDICARE PART B Recommended at least once for all adults. For pregnant patients, recommended with each pregnancy.   Shingles Vaccine (Shingrix) - COST NOT COVERED BY MEDICARE PART B  2 shot series recommended in those 19 years and older who have or will have weakened immune systems or those 50 years and older     Health Maintenance Due:      Topic Date Due   • Colorectal Cancer Screening  04/26/2026     Immunizations Due:      Topic Date Due   • COVID-19 Vaccine (7 - 2024-25 season) 09/01/2024     Advance Directives   What are advance directives?  Advance directives are legal documents that state your wishes and plans for medical care. These plans are made ahead of time in case you lose your ability to make decisions for yourself. Advance directives can apply to any medical decision, such as the treatments you want, and if you want to donate organs.   What are the types of advance directives?  There are many types of advance directives, and each state has rules about how to use them. You may choose a combination of any of the  following:  Living will:  This is a written record of the treatment you want. You can also choose which treatments you do not want, which to limit, and which to stop at a certain time. This includes surgery, medicine, IV fluid, and tube feedings.   Durable power of  for healthcare (DPAHC):  This is a written record that states who you want to make healthcare choices for you when you are unable to make them for yourself. This person, called a proxy, is usually a family member or a friend. You may choose more than 1 proxy.  Do not resuscitate (DNR) order:  A DNR order is used in case your heart stops beating or you stop breathing. It is a request not to have certain forms of treatment, such as CPR. A DNR order may be included in other types of advance directives.  Medical directive:  This covers the care that you want if you are in a coma, near death, or unable to make decisions for yourself. You can list the treatments you want for each condition. Treatment may include pain medicine, surgery, blood transfusions, dialysis, IV or tube feedings, and a ventilator (breathing machine).  Values history:  This document has questions about your views, beliefs, and how you feel and think about life. This information can help others choose the care that you would choose.  Why are advance directives important?  An advance directive helps you control your care. Although spoken wishes may be used, it is better to have your wishes written down. Spoken wishes can be misunderstood, or not followed. Treatments may be given even if you do not want them. An advance directive may make it easier for your family to make difficult choices about your care.   Fall Prevention    Fall prevention  includes ways to make your home and other areas safer. It also includes ways you can move more carefully to prevent a fall. Health conditions that cause changes in your blood pressure, vision, or muscle strength and coordination may increase  your risk for falls. Medicines may also increase your risk for falls if they make you dizzy, weak, or sleepy.   Fall prevention tips:   Stand or sit up slowly.    Use assistive devices as directed.    Wear shoes that fit well and have soles that .    Wear a personal alarm.    Stay active.    Manage your medical conditions.    Home Safety Tips:  Add items to prevent falls in the bathroom.    Keep paths clear.    Install bright lights in your home.    Keep items you use often on shelves within reach.    Paint or place reflective tape on the edges of your stairs.       © Copyright [employer] 2018 Information is for End User's use only and may not be sold, redistributed or otherwise used for commercial purposes. All illustrations and images included in CareNotes® are the copyrighted property of A.D.A.M., Inc[employer] or [employer]

## 2025-04-18 NOTE — ASSESSMENT & PLAN NOTE
Lab Results   Component Value Date    HGBA1C 6.1 (H) 04/11/2025   Stable on current dose of glimepiride 1 mg daily

## 2025-04-18 NOTE — ASSESSMENT & PLAN NOTE
Follows with cardiology, continue current regimen of amiodarone 200 mg daily, metoprolol 50 mg twice daily, Eliquis 5 mg twice daily  Patient reports that he is no longer on aspirin.

## 2025-04-18 NOTE — PROGRESS NOTES
Name: Jhonny Knowles Sr.      : 1934      MRN: [id number]  Encounter Provider: Pauline Vargas DO  Encounter Date: 2025   Encounter department: [facility]  :  Assessment & Plan  Medicare annual wellness visit, subsequent         Type 2 diabetes mellitus without complication, without long-term current use of insulin (HCC)    Lab Results   Component Value Date    HGBA1C 6.1 (H) 2025     Continues on glimepiride 1 mg daily, stable  Continue on lipitor 10 mg daily       Atrial fibrillation with RVR (HCC)  Follows with cardiology, continue current regimen of amiodarone 200 mg daily, metoprolol 50 mg twice daily, Eliquis 5 mg twice daily  Patient reports that he is no longer on aspirin.       Primary hypertension  /66  Follows with cardiology, continue current regimen       Dependence on cane  Ambulates with cane, patient did have 2 falls within the last year but feels steady on his feet when he has a cane.  Fall precautions extensively reviewed.       Type 2 diabetes mellitus with diabetic peripheral angiopathy without gangrene, without long-term current use of insulin (HCC)    Lab Results   Component Value Date    HGBA1C 6.1 (H) 2025   Stable on current dose of glimepiride 1 mg daily         Prostate cancer (HCC)  Most recent PSAs have been stable.  No change in symptoms         Depression Screening and Follow-up Plan: Patient was screened for depression during today's encounter. They screened negative with a PHQ-2 score of 0.      Falls Plan of Care: balance, strength, and gait training instructions were provided. Recommended assistive device to help with gait and balance. Medications that increase falls were reviewed.       Preventive health issues were discussed with patient, and age appropriate screening tests were ordered as noted in patient's After Visit Summary. Personalized health advice and appropriate referrals for health education or preventive services given  if needed, as noted in patient's After Visit Summary.    History of Present Illness     HPI     Doing well with no acute concerns or questions.  Patient Care Team:  Jorge Beckford MD as PCP - General  MD Jorge Kaba MD John R Anderson, MD (Urology)  Suman Tovar MD (Dermatology)  Droie Julien DPM (Podiatry)    Review of Systems   Constitutional:  Negative for chills and fever.   HENT:  Negative for congestion and rhinorrhea.    Eyes:  Negative for visual disturbance.   Respiratory:  Negative for cough and shortness of breath.    Cardiovascular:  Negative for chest pain and palpitations.   Gastrointestinal:  Negative for abdominal pain, constipation, diarrhea, nausea and vomiting.   Genitourinary:  Negative for dysuria.   Musculoskeletal:  Positive for gait problem (uses cane). Negative for arthralgias.   Skin:  Negative for rash.   Neurological:  Negative for dizziness, light-headedness and headaches.     Medical History Reviewed by provider this encounter:       Annual Wellness Visit Questionnaire   Jhonny is here for his Subsequent Wellness visit. Last Medicare Wellness visit information reviewed, patient interviewed and updates made to the record today.      Health Risk Assessment:   Patient rates overall health as very good. Patient feels that their physical health rating is slightly worse. Patient is very satisfied with their life. Eyesight was rated as same. Hearing was rated as same. Patient feels that their emotional and mental health rating is same. Patients states they are never, rarely angry. Patient states they are never, rarely unusually tired/fatigued. Pain experienced in the last 7 days has been some. Patient's pain rating has been 8/10. Patient states that he has experienced no weight loss or gain in last 6 months.     Depression Screening:   PHQ-2 Score: 0      Fall Risk Screening:   In the past year, patient has experienced: history of falling in past year     Number of falls: 2 or more  Injured during fall?: Yes    Feels unsteady when standing or walking?: Yes    Worried about falling?: No      Home Safety:  Patient does not have trouble with stairs inside or outside of their home. Patient has working smoke alarms and has working carbon monoxide detector. Home safety hazards include: loose rugs on the floor.     Nutrition:   Current diet is Low Carb.     Medications:   Patient is currently taking over-the-counter supplements. OTC medications include: see medication list. Patient is able to manage medications.     Activities of Daily Living (ADLs)/Instrumental Activities of Daily Living (IADLs):   Walk and transfer into and out of bed and chair?: Yes  Dress and groom yourself?: Yes    Bathe or shower yourself?: Yes    Feed yourself? Yes  Do your laundry/housekeeping?: Yes  Manage your money, pay your bills and track your expenses?: Yes  Make your own meals?: Yes    Do your own shopping?: Yes    Previous Hospitalizations:   Any hospitalizations or ED visits within the last 12 months?: Yes    How many hospitalizations have you had in the last year?: 1-2    Advance Care Planning:   Living will: Yes    Durable POA for healthcare: No    Advanced directive: Yes      Comments: Reports having living will at home.    Cognitive Screening:   Provider or family/friend/caregiver concerned regarding cognition?: No    Preventive Screenings      Cardiovascular Screening:    General: Screening Not Indicated and History Lipid Disorder      Diabetes Screening:     General: Screening Not Indicated and History Diabetes      Colorectal Cancer Screening:     General: Screening Not Indicated      Prostate Cancer Screening:    General: History Prostate Cancer and Screening Not Indicated      Osteoporosis Screening:    General: Screening Not Indicated      Abdominal Aortic Aneurysm (AAA) Screening:        General: Screening Not Indicated      Lung Cancer Screening:     General: Screening Not  Indicated    Screening, Brief Intervention, and Referral to Treatment (SBIRT)     Screening  Typical number of drinks in a day: 0  Typical number of drinks in a week: 0  Interpretation: Low risk drinking behavior.    Single Item Drug Screening:  How often have you used an illegal drug (including marijuana) or a prescription medication for non-medical reasons in the past year? never    Single Item Drug Screen Score: 0  Interpretation: Negative screen for possible drug use disorder    Other Counseling Topics:   Calcium and vitamin D intake and regular weightbearing exercise.     Social Drivers of Health     Financial Resource Strain: Low Risk  (3/28/2023)    Overall Financial Resource Strain (CARDIA)     Difficulty of Paying Living Expenses: Not very hard   Food Insecurity: No Food Insecurity (4/18/2025)    Hunger Vital Sign     Worried About Running Out of Food in the Last Year: Never true     Ran Out of Food in the Last Year: Never true   Transportation Needs: No Transportation Needs (4/18/2025)    PRAPARE - Transportation     Lack of Transportation (Medical): No     Lack of Transportation (Non-Medical): No   Housing Stability: Low Risk  (4/18/2025)    Housing Stability Vital Sign     Unable to Pay for Housing in the Last Year: No     Number of Times Moved in the Last Year: 0     Homeless in the Last Year: No   Utilities: Not At Risk (4/18/2025)    [facility] Utilities     Threatened with loss of utilities: No     No results found.    Objective   /66 (BP Location: Left arm, Patient Position: Sitting, Cuff Size: Large)   Pulse 65   Temp (!) 97.3 °F (36.3 °C) (Temporal)   Resp 16   Wt 71.9 kg (158 lb 8 oz)   SpO2 98%   BMI 22.74 kg/m²     Physical Exam  Vitals reviewed.   Constitutional:       General: He is not in acute distress.     Appearance: Normal appearance.   HENT:      Head: Normocephalic and atraumatic.      Right Ear: External ear normal.      Left Ear: External ear normal.      Nose: Nose normal.       Mouth/Throat:      Pharynx: Oropharynx is clear.   Eyes:      Conjunctiva/sclera: Conjunctivae normal.   Cardiovascular:      Rate and Rhythm: Normal rate and regular rhythm.      Pulses: Normal pulses.      Heart sounds: Normal heart sounds.   Pulmonary:      Effort: Pulmonary effort is normal.      Breath sounds: Normal breath sounds.   Abdominal:      Palpations: Abdomen is soft.   Musculoskeletal:      Right lower leg: Edema (1+ up to shin, not new, stable) present.      Left lower leg: No edema.   Skin:     General: Skin is warm.   Neurological:      Mental Status: He is alert and oriented to person, place, and time.      Gait: Gait abnormal (uses cane).   Psychiatric:         Mood and Affect: Mood normal.         Behavior: Behavior normal.

## 2025-05-13 ENCOUNTER — REMOTE DEVICE CLINIC VISIT (OUTPATIENT)
Dept: CARDIOLOGY CLINIC | Facility: CLINIC | Age: OVER 89
End: 2025-05-13
Payer: MEDICARE

## 2025-05-13 DIAGNOSIS — Z95.0 CARDIAC PACEMAKER IN SITU: Primary | ICD-10-CM

## 2025-05-13 PROCEDURE — 93296 REM INTERROG EVL PM/IDS: CPT | Performed by: STUDENT IN AN ORGANIZED HEALTH CARE EDUCATION/TRAINING PROGRAM

## 2025-05-13 PROCEDURE — 93294 REM INTERROG EVL PM/LDLS PM: CPT | Performed by: STUDENT IN AN ORGANIZED HEALTH CARE EDUCATION/TRAINING PROGRAM

## 2025-05-13 NOTE — PROGRESS NOTES
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 AP-97%, >99% (>40% AVB/MVPR@60PPM). ALL AVAILABLE LEAD PARAMETERS WITHIN NORMAL LIMITS. NO  NEW SIGNIFICANT HIGH RATE EPISODES SINCE 2/11/25 REMOTE. NORMAL DEVICE FUNCTION. GV

## 2025-05-14 ENCOUNTER — RESULTS FOLLOW-UP (OUTPATIENT)
Dept: CARDIOLOGY CLINIC | Facility: CLINIC | Age: OVER 89
End: 2025-05-14

## 2025-06-30 ENCOUNTER — OFFICE VISIT (OUTPATIENT)
Dept: FAMILY MEDICINE CLINIC | Facility: CLINIC | Age: OVER 89
End: 2025-06-30
Payer: MEDICARE

## 2025-06-30 ENCOUNTER — APPOINTMENT (OUTPATIENT)
Dept: RADIOLOGY | Age: OVER 89
End: 2025-06-30
Payer: MEDICARE

## 2025-06-30 VITALS
SYSTOLIC BLOOD PRESSURE: 128 MMHG | RESPIRATION RATE: 18 BRPM | TEMPERATURE: 97.5 F | DIASTOLIC BLOOD PRESSURE: 78 MMHG | WEIGHT: 156 LBS | BODY MASS INDEX: 22.33 KG/M2 | OXYGEN SATURATION: 97 % | HEIGHT: 70 IN | HEART RATE: 80 BPM

## 2025-06-30 DIAGNOSIS — S40.011A CONTUSION OF RIGHT SHOULDER, INITIAL ENCOUNTER: Primary | ICD-10-CM

## 2025-06-30 DIAGNOSIS — S40.011A CONTUSION OF RIGHT SHOULDER, INITIAL ENCOUNTER: ICD-10-CM

## 2025-06-30 PROCEDURE — 99213 OFFICE O/P EST LOW 20 MIN: CPT | Performed by: FAMILY MEDICINE

## 2025-06-30 PROCEDURE — G2211 COMPLEX E/M VISIT ADD ON: HCPCS | Performed by: FAMILY MEDICINE

## 2025-06-30 PROCEDURE — 73030 X-RAY EXAM OF SHOULDER: CPT

## 2025-06-30 NOTE — PROGRESS NOTES
"Name: Jhonny Knowles .      : 1934      MRN: [id number]  Encounter Provider: Jorge Beckford MD  Encounter Date: 2025   Encounter department: [facility]  :  Assessment & Plan  Contusion of right shoulder, initial encounter    Status post fall last week landing on right shoulder persistent pain with decreased range of motion.  Right-handed.  He has been using Extra strength Tylenol for pain    Suspected right rotator cuff tear/labral tear?    Orders:    XR shoulder 2+ vw right; Future    If x-rays negative for fracture consider further imaging pacemaker placed 2016 may not be MRI compatible.  Consider ultrasound or CT scan       History of Present Illness     CC right shoulder pain    Review of Systems   Musculoskeletal:  Positive for arthralgias. Negative for joint swelling and neck pain.   Neurological:  Negative for dizziness, weakness, numbness and headaches.       Objective   /78 (BP Location: Left arm, Patient Position: Sitting, Cuff Size: Standard)   Pulse 80   Temp 97.5 °F (36.4 °C) (Temporal)   Resp 18   Ht 5' 10\" (1.778 m)   Wt 70.8 kg (156 lb)   SpO2 97%   BMI 22.38 kg/m²      Physical Exam  Constitutional:       General: He is not in acute distress.  HENT:      Head:      Comments: Small abrasion right side of head    Musculoskeletal:      Left shoulder: Deformity and tenderness present. No swelling or effusion. Decreased range of motion. Decreased strength. Normal pulse.      Comments: Inspection R shoulder high riding?  No effusion. No warmth or redness. + R subacromial tenderness. Marked decreased ROM with abduction, internal and external rotation. Negative cross arm test. Negative Speed test. Negative Yergason sign. Negative  impingement sign. Negative empty can sign. Negative sulcus sign. + [place]. + drop arm test       Neurological:      Mental Status: He is alert.         "

## 2025-08-01 ENCOUNTER — TELEPHONE (OUTPATIENT)
Dept: ADMINISTRATIVE | Facility: OTHER | Age: OVER 89
End: 2025-08-01

## 2025-08-06 ENCOUNTER — IN-CLINIC DEVICE VISIT (OUTPATIENT)
Dept: CARDIOLOGY CLINIC | Facility: CLINIC | Age: OVER 89
End: 2025-08-06
Payer: MEDICARE

## 2025-08-06 DIAGNOSIS — I48.91 ATRIAL FIBRILLATION WITH RVR (HCC): ICD-10-CM

## 2025-08-06 DIAGNOSIS — Z95.0 PRESENCE OF CARDIAC PACEMAKER: ICD-10-CM

## 2025-08-06 DIAGNOSIS — I49.5 SSS (SICK SINUS SYNDROME) (HCC): Primary | ICD-10-CM

## 2025-08-06 PROCEDURE — 93280 PM DEVICE PROGR EVAL DUAL: CPT | Performed by: STUDENT IN AN ORGANIZED HEALTH CARE EDUCATION/TRAINING PROGRAM
